# Patient Record
Sex: MALE | Race: WHITE | NOT HISPANIC OR LATINO | Employment: FULL TIME | URBAN - METROPOLITAN AREA
[De-identification: names, ages, dates, MRNs, and addresses within clinical notes are randomized per-mention and may not be internally consistent; named-entity substitution may affect disease eponyms.]

---

## 2017-04-06 ENCOUNTER — OFFICE VISIT (OUTPATIENT)
Dept: INTERNAL MEDICINE | Facility: IMAGING CENTER | Age: 72
End: 2017-04-06
Payer: COMMERCIAL

## 2017-04-06 VITALS
SYSTOLIC BLOOD PRESSURE: 124 MMHG | BODY MASS INDEX: 21.19 KG/M2 | WEIGHT: 148 LBS | TEMPERATURE: 97.6 F | DIASTOLIC BLOOD PRESSURE: 68 MMHG | OXYGEN SATURATION: 98 % | RESPIRATION RATE: 14 BRPM | HEIGHT: 70 IN | HEART RATE: 67 BPM

## 2017-04-06 DIAGNOSIS — R73.09 ELEVATED GLUCOSE: ICD-10-CM

## 2017-04-06 DIAGNOSIS — M54.42 CHRONIC LEFT-SIDED LOW BACK PAIN WITH LEFT-SIDED SCIATICA: ICD-10-CM

## 2017-04-06 DIAGNOSIS — G89.29 CHRONIC LEFT-SIDED LOW BACK PAIN WITH LEFT-SIDED SCIATICA: ICD-10-CM

## 2017-04-06 DIAGNOSIS — R79.89 ELEVATED TSH: ICD-10-CM

## 2017-04-06 DIAGNOSIS — M54.32 SCIATICA OF LEFT SIDE: ICD-10-CM

## 2017-04-06 PROCEDURE — 99214 OFFICE O/P EST MOD 30 MIN: CPT | Performed by: FAMILY MEDICINE

## 2017-04-06 RX ORDER — METHYLPREDNISOLONE 4 MG/1
TABLET ORAL
Qty: 1 KIT | Refills: 0 | Status: SHIPPED | OUTPATIENT
Start: 2017-04-06 | End: 2018-01-09

## 2017-04-06 NOTE — PROGRESS NOTES
Chief Complaint   Patient presents with   • Back Pain     low back pain with left sciatica.       HISTORY OF PRESENT ILLNESS: Patient is a 71 y.o. male established patient who presents today complaining of low back pain with pain that radiates down the back of his left leg intermittently. It is becoming more frequent. The pain is worse when he sits for very long or lies down. First thing in the morning he has significant pain in his lower back that radiates down the back of his leg. Then as he moves around and start walking and actually feels better. He has been working with a physical therapist and doing some stretches. It seems to be getting worse instead of better. He uses heat and massage. He does do regular stretching exercises. He has degenerative disc disease in his neck. He has never had imaging on his low back. No injuries.  He is on no prescription medications. He denies any weakness in his leg, no problems with bowel or bladder.    Also is due for repeat lab work. His glucose was slightly elevated as was his TSH in October.    Patient Active Problem List    Diagnosis Date Noted   • DDD (degenerative disc disease), cervical 10/16/2016   • Hernia, inguinal, left 04/08/2016     Current Outpatient Prescriptions on File Prior to Visit   Medication Sig Dispense Refill   • cyanocobalamin (VITAMIN B-12) 500 MCG Tab Take 500 mcg by mouth every day.     • SAW PALMETTO, SERENOA REPENS, PO Take  by mouth.     • GINSENG PO Take  by mouth.     • Flaxseed, Linseed, (FLAX SEED OIL PO) Take  by mouth every day.     • VITAMIN E PO Take  by mouth every day.       No current facility-administered medications on file prior to visit.       Past medical, surgical, family, and social history is reviewed and updated in Epic chart by me today.   Medications and allergies reviewed and updated in Epic chart by me today.     REVIEW OF SYSTEMS:  GENERAL: No fatigue, no weight loss.  CV:  No chest pain,dyspnea,palpitations or  "edema.  RESP:  No sob,cough,wheezing or hemoptysis.  GI: No dysphagia, heartburn,abdominal pain, nausea, vomiting, diarrhea or constipation.       No melena, jaundice, bleeding, incontinence or change in bowel habits.  :  No dysuria, polyuria, hematuria, incontinence, or nocturia.  MS: As above.  NEURO:  No seizures, syncope, paralysis, tremor, or weakness.  SKIN: No new or concerning skin lesions or changes.   PSYCH: Mood fine.    Filed Vitals:    04/06/17 1500   BP: 124/68   Pulse: 67   Temp: 36.4 °C (97.6 °F)   Resp: 14   Height: 1.778 m (5' 10\")   Weight: 67.132 kg (148 lb)   SpO2: 98%     Physical Exam:  Gen: Well developed, well nourished. No acute distress.  Neck:  Supple, no adenopathy or thyromegaly.  Heart:  Regular rate and rhythm.  Normal S1, S2. No murmur, gallop or rub.  Lungs:  Clear, No wheezes,rales or rhonchi.  Spine: Nontender. Mild tenderness in his left SI joint area and paraspinous muscles. Gait is normal. Full range of movement.  Extremities:  No edema.  Psych: Mood and affect are appropriate.        Assessment/Plan:  1. Sciatica of left side. Recommend Medrol Dosepak. He is going to be traveling on a cruise in about 2 weeks. Recommend MRI. He'll continue with physical therapy, stretches. Further treatment based on MRI results.  MR-LUMBAR SPINE-W/O   2. Chronic left-sided low back pain with left-sided sciatica  MR-LUMBAR SPINE-W/O   3. Elevated glucose . Recheck glucose and A1c.  BLOOD GLUCOSE    HEMOGLOBIN A1C   4. Elevated TSH . He is asymptomatic. Monitor TSH.  TSH    FREE THYROXINE          "

## 2017-04-12 ENCOUNTER — NON-PROVIDER VISIT (OUTPATIENT)
Dept: INTERNAL MEDICINE | Facility: IMAGING CENTER | Age: 72
End: 2017-04-12
Payer: COMMERCIAL

## 2017-04-12 ENCOUNTER — HOSPITAL ENCOUNTER (OUTPATIENT)
Facility: MEDICAL CENTER | Age: 72
End: 2017-04-12
Attending: FAMILY MEDICINE
Payer: COMMERCIAL

## 2017-04-12 DIAGNOSIS — Z01.89 ROUTINE LAB DRAW: ICD-10-CM

## 2017-04-12 DIAGNOSIS — R73.09 ELEVATED GLUCOSE: ICD-10-CM

## 2017-04-12 DIAGNOSIS — R79.89 ELEVATED TSH: ICD-10-CM

## 2017-04-12 LAB
EST. AVERAGE GLUCOSE BLD GHB EST-MCNC: 123 MG/DL
GLUCOSE SERPL-MCNC: 110 MG/DL (ref 65–99)
HBA1C MFR BLD: 5.9 % (ref 0–5.6)
T4 FREE SERPL-MCNC: 1.04 NG/DL (ref 0.53–1.43)
TSH SERPL DL<=0.005 MIU/L-ACNC: 4.55 UIU/ML (ref 0.3–3.7)

## 2017-04-12 PROCEDURE — 82947 ASSAY GLUCOSE BLOOD QUANT: CPT

## 2017-04-12 PROCEDURE — 83036 HEMOGLOBIN GLYCOSYLATED A1C: CPT

## 2017-04-12 PROCEDURE — 84439 ASSAY OF FREE THYROXINE: CPT

## 2017-04-12 PROCEDURE — 84443 ASSAY THYROID STIM HORMONE: CPT

## 2017-05-03 ENCOUNTER — HOSPITAL ENCOUNTER (OUTPATIENT)
Dept: RADIOLOGY | Facility: MEDICAL CENTER | Age: 72
End: 2017-05-03
Attending: FAMILY MEDICINE
Payer: COMMERCIAL

## 2017-05-03 DIAGNOSIS — M54.32 SCIATICA OF LEFT SIDE: ICD-10-CM

## 2017-05-03 DIAGNOSIS — G89.29 CHRONIC LEFT-SIDED LOW BACK PAIN WITH LEFT-SIDED SCIATICA: ICD-10-CM

## 2017-05-03 DIAGNOSIS — M54.42 CHRONIC LEFT-SIDED LOW BACK PAIN WITH LEFT-SIDED SCIATICA: ICD-10-CM

## 2017-05-03 PROCEDURE — 72148 MRI LUMBAR SPINE W/O DYE: CPT

## 2017-05-09 ENCOUNTER — OFFICE VISIT (OUTPATIENT)
Dept: INTERNAL MEDICINE | Facility: IMAGING CENTER | Age: 72
End: 2017-05-09
Payer: COMMERCIAL

## 2017-05-09 VITALS
BODY MASS INDEX: 21.19 KG/M2 | WEIGHT: 148 LBS | HEIGHT: 70 IN | SYSTOLIC BLOOD PRESSURE: 140 MMHG | HEART RATE: 68 BPM | TEMPERATURE: 96.7 F | OXYGEN SATURATION: 98 % | DIASTOLIC BLOOD PRESSURE: 75 MMHG | RESPIRATION RATE: 14 BRPM

## 2017-05-09 DIAGNOSIS — R73.09 ELEVATED GLUCOSE: ICD-10-CM

## 2017-05-09 DIAGNOSIS — E03.9 HYPOTHYROIDISM, UNSPECIFIED TYPE: ICD-10-CM

## 2017-05-09 DIAGNOSIS — M51.36 DDD (DEGENERATIVE DISC DISEASE), LUMBAR: ICD-10-CM

## 2017-05-09 DIAGNOSIS — R53.83 FATIGUE, UNSPECIFIED TYPE: ICD-10-CM

## 2017-05-09 DIAGNOSIS — M48.061 LUMBAR SPINAL STENOSIS: ICD-10-CM

## 2017-05-09 PROBLEM — M51.369 DDD (DEGENERATIVE DISC DISEASE), LUMBAR: Chronic | Status: ACTIVE | Noted: 2017-05-09

## 2017-05-09 PROBLEM — M51.369 DDD (DEGENERATIVE DISC DISEASE), LUMBAR: Status: ACTIVE | Noted: 2017-05-09

## 2017-05-09 PROCEDURE — 99214 OFFICE O/P EST MOD 30 MIN: CPT | Performed by: FAMILY MEDICINE

## 2017-05-09 RX ORDER — LEVOTHYROXINE SODIUM 0.05 MG/1
50 TABLET ORAL
Qty: 30 TAB | Refills: 6 | Status: SHIPPED | OUTPATIENT
Start: 2017-05-09 | End: 2017-08-15

## 2017-05-09 NOTE — MR AVS SNAPSHOT
"        Chirag Damon Jr.   2017 8:30 AM   Office Visit   MRN: [id number]    Department:  [facility]   Dept Phone:  [phone]    Description:  Male : 1945   Provider:  Aruna Almanzar M.D.           Reason for Visit     Follow-Up review MRI and labs      Allergies as of 2017     No Known Allergies      You were diagnosed with     DDD (degenerative disc disease), lumbar   [538794]       Lumbar spinal stenosis   [573143]       Hypothyroidism, unspecified type   [3707229]       Fatigue, unspecified type   [5121814]       Elevated glucose   [309121]         Vital Signs     Blood Pressure Pulse Temperature Respirations Height Weight    140/75 mmHg 68 35.9 °C (96.7 °F) 14 1.778 m (5' 10\") 67.132 kg (148 lb)    Body Mass Index Oxygen Saturation Smoking Status             21.24 kg/m2 98% Never Smoker          Basic Information     Date Of Birth Sex Race Ethnicity Preferred Language    1945 Male White Non- English      Problem List              ICD-10-CM Priority Class Noted - Resolved    Hernia, inguinal, left K40.90   2016 - Present    DDD (degenerative disc disease), cervical (Chronic) M50.30   10/16/2016 - Present    DDD (degenerative disc disease), lumbar (Chronic) M51.36   2017 - Present    Lumbar spinal stenosis (Chronic) M48.06   2017 - Present    Hypothyroidism E03.9   2017 - Present    Elevated glucose R73.09   2017 - Present      Health Maintenance        Date Due Completion Dates    IMM DTaP/Tdap/Td Vaccine (1 - Tdap) 1965 ---    IMM PNEUMOCOCCAL 65+ (ADULT) LOW/MEDIUM RISK SERIES (2 of 2 - PPSV23) 2017    COLONOSCOPY 10/27/2019 10/27/2014            Current Immunizations     13-VALENT PCV PREVNAR 2016    SHINGLES VACCINE 2016      Below and/or attached are the medications your provider expects you to take. Review all of your home medications and newly ordered medications with your provider and/or pharmacist. Follow " medication instructions as directed by your provider and/or pharmacist. Please keep your medication list with you and share with your provider. Update the information when medications are discontinued, doses are changed, or new medications (including over-the-counter products) are added; and carry medication information at all times in the event of emergency situations     Allergies:  No Known Allergies          Medications  Valid as of: May 09, 2017 - 11:35 AM    Generic Name Brand Name Tablet Size Instructions for use    Cyanocobalamin (Tab) VITAMIN B-12 500 MCG Take 500 mcg by mouth every day.        Flaxseed (Linseed)   Take  by mouth every day.        Ginseng   Take  by mouth.        Levothyroxine Sodium (Tab) SYNTHROID 50 MCG Take 1 Tab by mouth Every morning on an empty stomach.        MethylPREDNISolone (Tablet Therapy Pack) MEDROL DOSEPAK 4 MG Take as directed        Saw Palmetto (Serenoa repens)   Take  by mouth.        Vitamin E   Take  by mouth every day.        .                 Medicines prescribed today were sent to:     [id number] - [place], [place] - [address]    [address] [address]    Phone: [phone] Fax: [phone]    Open 24 Hours?: No      Medication refill instructions:       If your prescription bottle indicates you have medication refills left, it is not necessary to call your provider’s office. Please contact your pharmacy and they will refill your medication.    If your prescription bottle indicates you do not have any refills left, you may request refills at any time through one of the following ways: The online [employer] system (except Urgent Care), by calling your provider’s office, or by asking your pharmacy to contact your provider’s office with a refill request. Medication refills are processed only during regular business hours and may not be available until the next business day. Your provider may request additional information or to have a follow-up visit  with you prior to refilling your medication.   *Please Note: Medication refills are assigned a new Rx number when refilled electronically. Your pharmacy may indicate that no refills were authorized even though a new prescription for the same medication is available at the pharmacy. Please request the medicine by name with the pharmacy before contacting your provider for a refill.        Your To Do List     Future Labs/Procedures Complete By Expires    BLOOD GLUCOSE  As directed 5/10/2018    FREE THYROXINE  As directed 5/10/2018    TESTOSTERONE SERUM  As directed 5/9/2018    TSH  As directed 5/10/2018      Referral     A referral request has been sent to our patient care coordination department. Please allow 3-5 business days for us to process this request and contact you either by phone or mail. If you do not hear from us by the 5th business day, please call us at [phone].        Other Notes About Your Plan     Ophth:  Dr. Allen  PT:  Billy Mcdowell  GI:  [facility]           MyChart Access Code: Activation code not generated  Current MyChart Status: Active

## 2017-05-09 NOTE — PROGRESS NOTES
Chief Complaint   Patient presents with   • Follow-Up     review MRI and labs       HISTORY OF PRESENT ILLNESS: Patient is a 71 y.o. male established patient who presents today follow-up on his MRI. He is having low back pain. It's worse in the morning. He feels stiff in the mornings. Initially he has trouble walking straight. He bends over and is more comfortable. After he does some exercises and gets moving his back does feel better. He has some left radicular symptoms. Was prescribed a Medrol Dosepak which he has not yet taken. Denies weakness.    MRI showed:  L5-S1: There is mild broad posterior endplate spurring. There is mild facet arthropathy. There is mild to moderate bilateral neural foraminal stenosis.  L4-5: There is moderate broad posterior disc bulge. There is moderate to marked facet arthropathy and ligamentum flavum hypertrophy. There is severe central canal stenosis with probable nerve root compression. There is mild to moderate right and moderate   left neural foraminal stenosis.  L3-4: There is moderate broad posterior endplate spurring. There is mild facet arthropathy and ligamentum flavum hypertrophy. There is mild central canal stenosis. There is moderate right and mild-to-moderate left neural foraminal stenosis.  L2-3: There is mild broad posterior disc bulge. There is mild facet arthropathy and ligamentum flavum hypertrophy. There is borderline central canal stenosis. There is mild to moderate bilateral neural foraminal stenosis.  L1-2: There is mild broad posterior disc bulge. There is mild facet arthropathy and ligamentum flavum hypertrophy. There is mild to moderate left neural foraminal stenosis.    Also had a repeat lab test done. He has TSH was mildly elevated. He does have a little fatigue. No other symptoms.  Glucose continues to be mildly elevated. His A1c was 5.9. He eats healthy, exercises.      Patient Active Problem List    Diagnosis Date Noted   • DDD (degenerative disc disease),  "lumbar 05/09/2017   • Lumbar spinal stenosis 05/09/2017   • Hypothyroidism 05/09/2017   • Elevated glucose 05/09/2017   • DDD (degenerative disc disease), cervical 10/16/2016   • Hernia, inguinal, left 04/08/2016     Current Outpatient Prescriptions on File Prior to Visit   Medication Sig Dispense Refill   • MethylPREDNISolone (MEDROL DOSEPAK) 4 MG Tablet Therapy Pack Take as directed 1 Kit 0   • cyanocobalamin (VITAMIN B-12) 500 MCG Tab Take 500 mcg by mouth every day.     • SAW PALMETTO, SERENOA REPENS, PO Take  by mouth.     • GINSENG PO Take  by mouth.     • Flaxseed, Linseed, (FLAX SEED OIL PO) Take  by mouth every day.     • VITAMIN E PO Take  by mouth every day.       No current facility-administered medications on file prior to visit.         Past medical, surgical, family, and social history is reviewed and updated in Epic chart by me today.   Medications and allergies reviewed and updated in Epic chart by me today.     REVIEW OF SYSTEMS:  GENERAL: mild fatigue, no weight loss.  CV:  No chest pain,dyspnea,palpitations or edema.  RESP:  No sob,cough,wheezing or hemoptysis.  GI: No dysphagia, heartburn,abdominal pain, nausea, vomiting, diarrhea or constipation.       No melena, jaundice, bleeding, incontinence or change in bowel habits.  :  No dysuria, polyuria, hematuria, incontinence, or nocturia.  MS: As above.  NEURO:  No seizures, syncope, paralysis, tremor, or weakness.  SKIN: He has multiple skin spots he would like checked today, some of them are itchy and irrated.   PSYCH: Mood fine.    Filed Vitals:    05/09/17 0800   BP: 140/75   Pulse: 68   Temp: 35.9 °C (96.7 °F)   Resp: 14   Height: 1.778 m (5' 10\")   Weight: 67.132 kg (148 lb)   SpO2: 98%       Physical Exam:  Gen: Well developed, well nourished. No acute distress.  Neck:  Supple, no adenopathy or thyromegaly.  Heart:  Regular rate and rhythm.  Normal S1, S2. No murmur, gallop or rub.  Lungs:  Clear, No wheezes,rales or rhonchi.  Extremities: "  No edema.  Psych: Mood and affect are appropriate.  Skin: multiple scattered , brown, elevated lesions consistent with seborrheic keratoses. These are scattered on his neck, trunk and arms.          Assessment/Plan:  1. DDD (degenerative disc disease), lumbar. Referral to physical therapy. Education. He may take the Medrol Dosepak. Follow-up in 3 months.  REFERRAL TO PHYSICAL THERAPY Reason for Therapy: Eval/Treat/Report   2. Lumbar spinal stenosis  REFERRAL TO PHYSICAL THERAPY Reason for Therapy: Eval/Treat/Report   3. Hypothyroidism, unspecified type. Start levothyroxine 50 µg daily. Recheck in 3 months.  levothyroxine (SYNTHROID) 50 MCG Tab    TSH    FREE THYROXINE   4. Fatigue, unspecified type . Check testosterone with his next lab work.  TESTOSTERONE SERUM   5. Elevated glucose . Continue efforts at healthy diet, regular exercise. Monitor.  BLOOD GLUCOSE   6 seborrheic keratoses. Multiple lesions are treated with liquid nitrogen. He is instructed in wound care and will call for any concerns..        Follow-up 3 months and as needed.

## 2017-07-31 ENCOUNTER — NON-PROVIDER VISIT (OUTPATIENT)
Dept: INTERNAL MEDICINE | Facility: IMAGING CENTER | Age: 72
End: 2017-07-31
Payer: COMMERCIAL

## 2017-07-31 ENCOUNTER — HOSPITAL ENCOUNTER (OUTPATIENT)
Facility: MEDICAL CENTER | Age: 72
End: 2017-07-31
Attending: FAMILY MEDICINE
Payer: COMMERCIAL

## 2017-07-31 DIAGNOSIS — Z01.89 ROUTINE LAB DRAW: ICD-10-CM

## 2017-07-31 DIAGNOSIS — E03.9 HYPOTHYROIDISM, UNSPECIFIED TYPE: ICD-10-CM

## 2017-07-31 DIAGNOSIS — R73.09 ELEVATED GLUCOSE: ICD-10-CM

## 2017-07-31 DIAGNOSIS — R53.83 FATIGUE, UNSPECIFIED TYPE: ICD-10-CM

## 2017-07-31 LAB
GLUCOSE SERPL-MCNC: 106 MG/DL (ref 65–99)
T4 FREE SERPL-MCNC: 0.91 NG/DL (ref 0.53–1.43)
TESTOST SERPL-MCNC: 632 NG/DL (ref 175–781)
TSH SERPL DL<=0.005 MIU/L-ACNC: 4.51 UIU/ML (ref 0.3–3.7)

## 2017-07-31 PROCEDURE — 84439 ASSAY OF FREE THYROXINE: CPT

## 2017-07-31 PROCEDURE — 84403 ASSAY OF TOTAL TESTOSTERONE: CPT

## 2017-07-31 PROCEDURE — 82947 ASSAY GLUCOSE BLOOD QUANT: CPT

## 2017-07-31 PROCEDURE — 36415 COLL VENOUS BLD VENIPUNCTURE: CPT | Performed by: FAMILY MEDICINE

## 2017-07-31 PROCEDURE — 84443 ASSAY THYROID STIM HORMONE: CPT

## 2017-08-14 ENCOUNTER — TELEPHONE (OUTPATIENT)
Dept: INTERNAL MEDICINE | Facility: IMAGING CENTER | Age: 72
End: 2017-08-14

## 2017-08-14 NOTE — TELEPHONE ENCOUNTER
----- Message from Jessica Miller R.N. sent at 8/14/2017 10:47 AM PDT -----  Chirag states that he is taking his thyroid and would like to discuss the lab results with you over the phone [phone].    Jessica

## 2017-08-15 RX ORDER — LEVOTHYROXINE SODIUM 0.07 MG/1
75 TABLET ORAL
Qty: 30 TAB | Refills: 6 | Status: SHIPPED | OUTPATIENT
Start: 2017-08-15 | End: 2017-10-23 | Stop reason: SDUPTHER

## 2017-10-23 RX ORDER — LEVOTHYROXINE SODIUM 0.07 MG/1
75 TABLET ORAL
Qty: 30 TAB | Refills: 6 | Status: SHIPPED | OUTPATIENT
Start: 2017-10-23 | End: 2019-10-30

## 2018-01-09 ENCOUNTER — OFFICE VISIT (OUTPATIENT)
Dept: INTERNAL MEDICINE | Facility: IMAGING CENTER | Age: 73
End: 2018-01-09
Payer: COMMERCIAL

## 2018-01-09 VITALS
HEART RATE: 68 BPM | SYSTOLIC BLOOD PRESSURE: 134 MMHG | RESPIRATION RATE: 14 BRPM | DIASTOLIC BLOOD PRESSURE: 72 MMHG | OXYGEN SATURATION: 96 % | TEMPERATURE: 97.8 F | HEIGHT: 70 IN | BODY MASS INDEX: 21.19 KG/M2 | WEIGHT: 148 LBS

## 2018-01-09 DIAGNOSIS — M51.36 DDD (DEGENERATIVE DISC DISEASE), LUMBAR: Chronic | ICD-10-CM

## 2018-01-09 DIAGNOSIS — M54.16 LEFT LUMBAR RADICULITIS: ICD-10-CM

## 2018-01-09 PROCEDURE — 99214 OFFICE O/P EST MOD 30 MIN: CPT | Performed by: FAMILY MEDICINE

## 2018-01-09 RX ORDER — METHYLPREDNISOLONE 4 MG/1
TABLET ORAL
Qty: 1 KIT | Refills: 0 | Status: SHIPPED | OUTPATIENT
Start: 2018-01-09 | End: 2018-01-12 | Stop reason: SDUPTHER

## 2018-01-09 NOTE — PROGRESS NOTES
Chief Complaint   Patient presents with   • Leg Pain     left     HISTORY OF PRESENT ILLNESS: Patient is a 72 y.o. male established patient who presents today Complaining of numbness tingling and pain into his left lateral leg. He took a trip to [place]. He thinks that the long ride home have been what aggravated his sciatica. He had an MRI in the May, 2017 that did show significant degenerative disc disease with spinal stenosis and foraminal stenosis. He states he was having a lot of pain in that left leg yesterday morning. He met with his physical therapist yesterday afternoon and that seemed to help. During the night he was most comfortable if he was in the fetal position. He denies any weakness into his leg. No foot drop. No bowel or bladder issues. He has not taken anything for it but doing stretches has helped.    In regards to his ongoing degenerative disc disease, he would like to avoid epidurals and surgery. He again works with the therapist. Exercise-wise he is no longer able to run. Hiking down hills is painful. He does do best on exercise bicycle and is looking into buying 1.        Patient Active Problem List    Diagnosis Date Noted   • DDD (degenerative disc disease), lumbar 05/09/2017   • Lumbar spinal stenosis 05/09/2017   • Hypothyroidism 05/09/2017   • Elevated glucose 05/09/2017   • DDD (degenerative disc disease), cervical 10/16/2016   • Hernia, inguinal, left 04/08/2016     Current Outpatient Prescriptions on File Prior to Visit   Medication Sig Dispense Refill   • levothyroxine (SYNTHROID) 75 MCG Tab Take 1 Tab by mouth Every morning on an empty stomach. 30 Tab 6   • cyanocobalamin (VITAMIN B-12) 500 MCG Tab Take 500 mcg by mouth every day.     • SAW PALMETTO, SERENOA REPENS, PO Take  by mouth.     • GINSENG PO Take  by mouth.     • Flaxseed, Linseed, (FLAX SEED OIL PO) Take  by mouth every day.     • VITAMIN E PO Take  by mouth every day.       No current facility-administered medications on  "file prior to visit.        Past medical, surgical, family, and social history is reviewed and updated in Epic chart by me today.   Medications and allergies reviewed and updated in Epic chart by me today.     REVIEW OF SYSTEMS:  GENERAL: No fatigue, no weight loss.  CV:  No chest pain,dyspnea,palpitations or edema.  RESP:  No sob,cough,wheezing or hemoptysis.  GI: No dysphagia, heartburn,abdominal pain, nausea, vomiting, diarrhea or constipation.       No melena, jaundice, bleeding, incontinence or change in bowel habits.  :  No dysuria, polyuria, hematuria, incontinence, or nocturia.  MS: As above.  NEURO:  No seizures, syncope, paralysis, tremor, or weakness.  SKIN: No new or concerning skin lesions or changes.   PSYCH: Mood fine.    Vitals:    01/09/18 1400   BP: 134/72   Pulse: 68   Resp: 14   Temp: 36.6 °C (97.8 °F)   SpO2: 96%   Weight: 67.1 kg (148 lb)   Height: 1.778 m (5' 10\")     Physical Exam:  Gen: Well developed, well nourished. No acute distress.  Neck:  Supple, no adenopathy or thyromegaly.  Heart:  Regular rate and rhythm.  Normal S1, S2. No murmur, gallop or rub.  Lungs:  Clear, No wheezes,rales or rhonchi.  Back:   mild tenderness along his LS spine. Full range of motion. Gait is normal. Muscle strength 5/5 and symmetric in his lower extremities.   EXtremities:  No edema.  Psych: Mood and affect are appropriate.    Reviewed MRI report from May third, 2017.    Assessment/Plan:  1. DDD (degenerative disc disease), lumbar. Encouraged him to continue with physical therapy. Gentle stretches. Medrol Dosepak. Call if not improving.  MethylPREDNISolone (MEDROL DOSEPAK) 4 MG Tablet Therapy Pack   2. Left lumbar radiculitis  MethylPREDNISolone (MEDROL DOSEPAK) 4 MG Tablet Therapy Pack        "

## 2018-01-12 DIAGNOSIS — M54.16 LEFT LUMBAR RADICULITIS: ICD-10-CM

## 2018-01-12 DIAGNOSIS — M51.36 DDD (DEGENERATIVE DISC DISEASE), LUMBAR: Chronic | ICD-10-CM

## 2018-01-12 RX ORDER — METHYLPREDNISOLONE 4 MG/1
TABLET ORAL
Qty: 1 KIT | Refills: 0 | Status: SHIPPED | OUTPATIENT
Start: 2018-01-12 | End: 2019-03-21

## 2018-01-23 DIAGNOSIS — Z00.00 PREVENTATIVE HEALTH CARE: ICD-10-CM

## 2018-01-23 DIAGNOSIS — R73.09 ELEVATED GLUCOSE: ICD-10-CM

## 2018-01-23 DIAGNOSIS — E03.9 HYPOTHYROIDISM, UNSPECIFIED TYPE: ICD-10-CM

## 2018-01-31 ENCOUNTER — NON-PROVIDER VISIT (OUTPATIENT)
Dept: INTERNAL MEDICINE | Facility: IMAGING CENTER | Age: 73
End: 2018-01-31
Payer: COMMERCIAL

## 2018-01-31 ENCOUNTER — HOSPITAL ENCOUNTER (OUTPATIENT)
Facility: MEDICAL CENTER | Age: 73
End: 2018-01-31
Attending: FAMILY MEDICINE
Payer: COMMERCIAL

## 2018-01-31 DIAGNOSIS — Z01.89 ROUTINE LAB DRAW: ICD-10-CM

## 2018-01-31 DIAGNOSIS — E03.9 HYPOTHYROIDISM, UNSPECIFIED TYPE: ICD-10-CM

## 2018-01-31 DIAGNOSIS — R73.09 ELEVATED GLUCOSE: ICD-10-CM

## 2018-01-31 DIAGNOSIS — Z00.00 PREVENTATIVE HEALTH CARE: ICD-10-CM

## 2018-01-31 LAB
25(OH)D3 SERPL-MCNC: 23 NG/ML (ref 30–100)
ALBUMIN SERPL BCP-MCNC: 4.6 G/DL (ref 3.2–4.9)
ALBUMIN/GLOB SERPL: 1.8 G/DL
ALP SERPL-CCNC: 42 U/L (ref 30–99)
ALT SERPL-CCNC: 18 U/L (ref 2–50)
ANION GAP SERPL CALC-SCNC: 7 MMOL/L (ref 0–11.9)
AST SERPL-CCNC: 20 U/L (ref 12–45)
BASOPHILS # BLD AUTO: 0.9 % (ref 0–1.8)
BASOPHILS # BLD: 0.05 K/UL (ref 0–0.12)
BILIRUB SERPL-MCNC: 0.5 MG/DL (ref 0.1–1.5)
BUN SERPL-MCNC: 12 MG/DL (ref 8–22)
CALCIUM SERPL-MCNC: 9 MG/DL (ref 8.5–10.5)
CHLORIDE SERPL-SCNC: 99 MMOL/L (ref 96–112)
CHOLEST SERPL-MCNC: 174 MG/DL (ref 100–199)
CO2 SERPL-SCNC: 27 MMOL/L (ref 20–33)
CREAT SERPL-MCNC: 0.84 MG/DL (ref 0.5–1.4)
EOSINOPHIL # BLD AUTO: 0.1 K/UL (ref 0–0.51)
EOSINOPHIL NFR BLD: 1.7 % (ref 0–6.9)
ERYTHROCYTE [DISTWIDTH] IN BLOOD BY AUTOMATED COUNT: 45.9 FL (ref 35.9–50)
EST. AVERAGE GLUCOSE BLD GHB EST-MCNC: 126 MG/DL
GLOBULIN SER CALC-MCNC: 2.5 G/DL (ref 1.9–3.5)
GLUCOSE SERPL-MCNC: 112 MG/DL (ref 65–99)
HBA1C MFR BLD: 6 % (ref 0–5.6)
HCT VFR BLD AUTO: 40.8 % (ref 42–52)
HDLC SERPL-MCNC: 63 MG/DL
HGB BLD-MCNC: 13.8 G/DL (ref 14–18)
IMM GRANULOCYTES # BLD AUTO: 0.02 K/UL (ref 0–0.11)
IMM GRANULOCYTES NFR BLD AUTO: 0.3 % (ref 0–0.9)
LDLC SERPL CALC-MCNC: 101 MG/DL
LYMPHOCYTES # BLD AUTO: 2.31 K/UL (ref 1–4.8)
LYMPHOCYTES NFR BLD: 40.4 % (ref 22–41)
MCH RBC QN AUTO: 32.6 PG (ref 27–33)
MCHC RBC AUTO-ENTMCNC: 33.8 G/DL (ref 33.7–35.3)
MCV RBC AUTO: 96.5 FL (ref 81.4–97.8)
MONOCYTES # BLD AUTO: 0.56 K/UL (ref 0–0.85)
MONOCYTES NFR BLD AUTO: 9.8 % (ref 0–13.4)
NEUTROPHILS # BLD AUTO: 2.68 K/UL (ref 1.82–7.42)
NEUTROPHILS NFR BLD: 46.9 % (ref 44–72)
NRBC # BLD AUTO: 0 K/UL
NRBC BLD-RTO: 0 /100 WBC
PLATELET # BLD AUTO: 267 K/UL (ref 164–446)
PMV BLD AUTO: 8.7 FL (ref 9–12.9)
POTASSIUM SERPL-SCNC: 3.9 MMOL/L (ref 3.6–5.5)
PROT SERPL-MCNC: 7.1 G/DL (ref 6–8.2)
RBC # BLD AUTO: 4.23 M/UL (ref 4.7–6.1)
SODIUM SERPL-SCNC: 133 MMOL/L (ref 135–145)
T4 FREE SERPL-MCNC: 1 NG/DL (ref 0.53–1.43)
TRIGL SERPL-MCNC: 52 MG/DL (ref 0–149)
TSH SERPL DL<=0.005 MIU/L-ACNC: 4.16 UIU/ML (ref 0.38–5.33)
WBC # BLD AUTO: 5.7 K/UL (ref 4.8–10.8)

## 2018-01-31 PROCEDURE — 84443 ASSAY THYROID STIM HORMONE: CPT

## 2018-01-31 PROCEDURE — 85025 COMPLETE CBC W/AUTO DIFF WBC: CPT

## 2018-01-31 PROCEDURE — 82306 VITAMIN D 25 HYDROXY: CPT

## 2018-01-31 PROCEDURE — 84439 ASSAY OF FREE THYROXINE: CPT

## 2018-01-31 PROCEDURE — 83036 HEMOGLOBIN GLYCOSYLATED A1C: CPT

## 2018-01-31 PROCEDURE — 80053 COMPREHEN METABOLIC PANEL: CPT

## 2018-01-31 PROCEDURE — 80061 LIPID PANEL: CPT

## 2018-02-02 ENCOUNTER — TELEPHONE (OUTPATIENT)
Dept: INTERNAL MEDICINE | Facility: IMAGING CENTER | Age: 73
End: 2018-02-02

## 2018-02-02 DIAGNOSIS — Z12.11 COLON CANCER SCREENING: ICD-10-CM

## 2018-02-02 DIAGNOSIS — E55.9 VITAMIN D DEFICIENCY: ICD-10-CM

## 2018-02-02 NOTE — TELEPHONE ENCOUNTER
Please let Chirag know his labs are overall fine.  His glucose still mildly elevated, but A1c is stable. At 6.0  His hgb is just slightly low. I would like him to do a stool test for microscopic blood to monitor and then repeat lab in 3 months.  His vit D is low. Take 2000 IU daily. If he already is taking vit D---increase by 2000 IU.

## 2018-02-14 ENCOUNTER — HOSPITAL ENCOUNTER (OUTPATIENT)
Facility: MEDICAL CENTER | Age: 73
End: 2018-02-14
Attending: FAMILY MEDICINE
Payer: COMMERCIAL

## 2018-02-14 PROCEDURE — 82274 ASSAY TEST FOR BLOOD FECAL: CPT

## 2018-02-22 DIAGNOSIS — Z12.11 COLON CANCER SCREENING: ICD-10-CM

## 2018-02-23 LAB — HEMOCCULT STL QL IA: NEGATIVE

## 2018-06-28 RX ORDER — LEVOTHYROXINE SODIUM 0.05 MG/1
TABLET ORAL
Qty: 30 TAB | Refills: 5 | Status: SHIPPED | OUTPATIENT
Start: 2018-06-28 | End: 2019-03-21

## 2018-07-16 ENCOUNTER — TELEPHONE (OUTPATIENT)
Dept: INTERNAL MEDICINE | Facility: IMAGING CENTER | Age: 73
End: 2018-07-16

## 2018-07-16 DIAGNOSIS — Z12.5 SCREENING FOR MALIGNANT NEOPLASM OF PROSTATE: ICD-10-CM

## 2018-07-16 DIAGNOSIS — R73.09 ELEVATED GLUCOSE: ICD-10-CM

## 2018-07-16 DIAGNOSIS — E55.9 VITAMIN D DEFICIENCY: ICD-10-CM

## 2018-07-16 DIAGNOSIS — E03.9 HYPOTHYROIDISM, UNSPECIFIED TYPE: ICD-10-CM

## 2018-07-17 NOTE — TELEPHONE ENCOUNTER
----- Message from Jessica Miller R.N. sent at 7/16/2018  4:12 PM PDT -----  Do you want any labs?  He is also coming Tuesday.    M

## 2019-02-01 ENCOUNTER — APPOINTMENT (OUTPATIENT)
Dept: INTERNAL MEDICINE | Facility: IMAGING CENTER | Age: 74
End: 2019-02-01
Payer: MEDICARE

## 2019-02-01 ENCOUNTER — APPOINTMENT (OUTPATIENT)
Dept: LAB | Facility: MEDICAL CENTER | Age: 74
End: 2019-02-01
Attending: FAMILY MEDICINE
Payer: MEDICARE

## 2019-02-15 DIAGNOSIS — D64.9 MILD ANEMIA: ICD-10-CM

## 2019-02-15 DIAGNOSIS — R73.09 ELEVATED GLUCOSE: ICD-10-CM

## 2019-02-15 DIAGNOSIS — E78.00 ELEVATED CHOLESTEROL: ICD-10-CM

## 2019-03-15 ENCOUNTER — NON-PROVIDER VISIT (OUTPATIENT)
Dept: INTERNAL MEDICINE | Facility: IMAGING CENTER | Age: 74
End: 2019-03-15
Payer: MEDICARE

## 2019-03-15 ENCOUNTER — HOSPITAL ENCOUNTER (OUTPATIENT)
Facility: MEDICAL CENTER | Age: 74
End: 2019-03-15
Attending: FAMILY MEDICINE
Payer: MEDICARE

## 2019-03-15 DIAGNOSIS — E78.00 ELEVATED CHOLESTEROL: ICD-10-CM

## 2019-03-15 DIAGNOSIS — D64.9 MILD ANEMIA: ICD-10-CM

## 2019-03-15 DIAGNOSIS — E03.9 HYPOTHYROIDISM, UNSPECIFIED TYPE: ICD-10-CM

## 2019-03-15 DIAGNOSIS — R73.09 ELEVATED GLUCOSE: ICD-10-CM

## 2019-03-15 DIAGNOSIS — E55.9 VITAMIN D DEFICIENCY: ICD-10-CM

## 2019-03-15 DIAGNOSIS — Z12.5 SCREENING FOR MALIGNANT NEOPLASM OF PROSTATE: ICD-10-CM

## 2019-03-15 LAB
25(OH)D3 SERPL-MCNC: 20 NG/ML (ref 30–100)
ALBUMIN SERPL BCP-MCNC: 4.4 G/DL (ref 3.2–4.9)
ALBUMIN/GLOB SERPL: 1.4 G/DL
ALP SERPL-CCNC: 50 U/L (ref 30–99)
ALT SERPL-CCNC: 14 U/L (ref 2–50)
ANION GAP SERPL CALC-SCNC: 7 MMOL/L (ref 0–11.9)
AST SERPL-CCNC: 17 U/L (ref 12–45)
BASOPHILS # BLD AUTO: 0.8 % (ref 0–1.8)
BASOPHILS # BLD: 0.04 K/UL (ref 0–0.12)
BILIRUB SERPL-MCNC: 0.6 MG/DL (ref 0.1–1.5)
BUN SERPL-MCNC: 10 MG/DL (ref 8–22)
CALCIUM SERPL-MCNC: 9.6 MG/DL (ref 8.5–10.5)
CHLORIDE SERPL-SCNC: 102 MMOL/L (ref 96–112)
CHOLEST SERPL-MCNC: 182 MG/DL (ref 100–199)
CO2 SERPL-SCNC: 24 MMOL/L (ref 20–33)
CREAT SERPL-MCNC: 1.03 MG/DL (ref 0.5–1.4)
EOSINOPHIL # BLD AUTO: 0.16 K/UL (ref 0–0.51)
EOSINOPHIL NFR BLD: 3.2 % (ref 0–6.9)
ERYTHROCYTE [DISTWIDTH] IN BLOOD BY AUTOMATED COUNT: 47.8 FL (ref 35.9–50)
EST. AVERAGE GLUCOSE BLD GHB EST-MCNC: 128 MG/DL
GLOBULIN SER CALC-MCNC: 3.2 G/DL (ref 1.9–3.5)
GLUCOSE SERPL-MCNC: 112 MG/DL (ref 65–99)
HBA1C MFR BLD: 6.1 % (ref 0–5.6)
HCT VFR BLD AUTO: 43.6 % (ref 42–52)
HDLC SERPL-MCNC: 71 MG/DL
HGB BLD-MCNC: 14.4 G/DL (ref 14–18)
IMM GRANULOCYTES # BLD AUTO: 0.01 K/UL (ref 0–0.11)
IMM GRANULOCYTES NFR BLD AUTO: 0.2 % (ref 0–0.9)
LDLC SERPL CALC-MCNC: 100 MG/DL
LYMPHOCYTES # BLD AUTO: 2.15 K/UL (ref 1–4.8)
LYMPHOCYTES NFR BLD: 42.5 % (ref 22–41)
MCH RBC QN AUTO: 33 PG (ref 27–33)
MCHC RBC AUTO-ENTMCNC: 33 G/DL (ref 33.7–35.3)
MCV RBC AUTO: 100 FL (ref 81.4–97.8)
MONOCYTES # BLD AUTO: 0.5 K/UL (ref 0–0.85)
MONOCYTES NFR BLD AUTO: 9.9 % (ref 0–13.4)
NEUTROPHILS # BLD AUTO: 2.2 K/UL (ref 1.82–7.42)
NEUTROPHILS NFR BLD: 43.4 % (ref 44–72)
NRBC # BLD AUTO: 0 K/UL
NRBC BLD-RTO: 0 /100 WBC
PLATELET # BLD AUTO: 279 K/UL (ref 164–446)
PMV BLD AUTO: 8.8 FL (ref 9–12.9)
POTASSIUM SERPL-SCNC: 4.4 MMOL/L (ref 3.6–5.5)
PROT SERPL-MCNC: 7.6 G/DL (ref 6–8.2)
PSA SERPL-MCNC: 0.27 NG/ML (ref 0–4)
RBC # BLD AUTO: 4.36 M/UL (ref 4.7–6.1)
SODIUM SERPL-SCNC: 133 MMOL/L (ref 135–145)
T4 FREE SERPL-MCNC: 1.08 NG/DL (ref 0.53–1.43)
TRIGL SERPL-MCNC: 53 MG/DL (ref 0–149)
TSH SERPL DL<=0.005 MIU/L-ACNC: 3.22 UIU/ML (ref 0.38–5.33)
WBC # BLD AUTO: 5.1 K/UL (ref 4.8–10.8)

## 2019-03-15 PROCEDURE — 83036 HEMOGLOBIN GLYCOSYLATED A1C: CPT

## 2019-03-15 PROCEDURE — 85025 COMPLETE CBC W/AUTO DIFF WBC: CPT

## 2019-03-15 PROCEDURE — 84439 ASSAY OF FREE THYROXINE: CPT

## 2019-03-15 PROCEDURE — 84153 ASSAY OF PSA TOTAL: CPT

## 2019-03-15 PROCEDURE — 80053 COMPREHEN METABOLIC PANEL: CPT

## 2019-03-15 PROCEDURE — 80061 LIPID PANEL: CPT

## 2019-03-15 PROCEDURE — 84443 ASSAY THYROID STIM HORMONE: CPT

## 2019-03-15 PROCEDURE — 82306 VITAMIN D 25 HYDROXY: CPT

## 2019-03-21 ENCOUNTER — OFFICE VISIT (OUTPATIENT)
Dept: INTERNAL MEDICINE | Facility: IMAGING CENTER | Age: 74
End: 2019-03-21
Payer: MEDICARE

## 2019-03-21 VITALS
WEIGHT: 147 LBS | RESPIRATION RATE: 12 BRPM | DIASTOLIC BLOOD PRESSURE: 70 MMHG | BODY MASS INDEX: 21.05 KG/M2 | SYSTOLIC BLOOD PRESSURE: 118 MMHG | HEIGHT: 70 IN | HEART RATE: 60 BPM | TEMPERATURE: 97.4 F | OXYGEN SATURATION: 99 %

## 2019-03-21 DIAGNOSIS — L57.0 AK (ACTINIC KERATOSIS): ICD-10-CM

## 2019-03-21 DIAGNOSIS — E03.9 HYPOTHYROIDISM, UNSPECIFIED TYPE: ICD-10-CM

## 2019-03-21 DIAGNOSIS — E55.9 VITAMIN D DEFICIENCY: ICD-10-CM

## 2019-03-21 DIAGNOSIS — L82.1 SK (SEBORRHEIC KERATOSIS): ICD-10-CM

## 2019-03-21 DIAGNOSIS — R73.09 ELEVATED GLUCOSE: ICD-10-CM

## 2019-03-21 PROCEDURE — 17000 DESTRUCT PREMALG LESION: CPT | Mod: 59 | Performed by: FAMILY MEDICINE

## 2019-03-21 PROCEDURE — 17110 DESTRUCTION B9 LES UP TO 14: CPT | Performed by: FAMILY MEDICINE

## 2019-03-21 PROCEDURE — 17003 DESTRUCT PREMALG LES 2-14: CPT | Performed by: FAMILY MEDICINE

## 2019-03-21 PROCEDURE — 99213 OFFICE O/P EST LOW 20 MIN: CPT | Mod: 25 | Performed by: FAMILY MEDICINE

## 2019-03-21 NOTE — PROGRESS NOTES
Chief Complaint   Patient presents with   • Skin Lesion     check multiple lesions   • Other     review labs       HISTORY OF PRESENT ILLNESS: Patient is a 73 y.o. male established patient who presents today to review lab work.  He has a history of hypothyroidism.  He is on levothyroxine 75 mcg.  He takes it about 5 days of the week.  He states he does sometimes forget.  He denies any palpitations, no constipation, no diarrhea.  His weight stays stable.    His glucose remains mildly elevated.  Consistently around 115 or less.  His A1c is 6.1.  He does try to monitor his diet.  He exercises.  He is not overweight.    His vitamin D is low.  He is presently not taking a supplementation.    Is also complaining of multiple skin lesions.  He like to have these checked.  He has multiple on his back and anterior chest.  Some along his neckline that do catch on clothing.  And then he has several on his scalp and forehead.  He does try to wear hats and use sun protection.          Patient Active Problem List    Diagnosis Date Noted   • Vitamin D deficiency 02/02/2018   • DDD (degenerative disc disease), lumbar 05/09/2017   • Lumbar spinal stenosis 05/09/2017   • Hypothyroidism 05/09/2017   • Elevated glucose 05/09/2017   • DDD (degenerative disc disease), cervical 10/16/2016   • Hernia, inguinal, left 04/08/2016     Current Outpatient Prescriptions on File Prior to Visit   Medication Sig Dispense Refill   • levothyroxine (SYNTHROID) 75 MCG Tab Take 1 Tab by mouth Every morning on an empty stomach. 30 Tab 6   • cyanocobalamin (VITAMIN B-12) 500 MCG Tab Take 500 mcg by mouth every day.     • SAW PALMETTO, SERENOA REPENS, PO Take  by mouth.     • GINSENG PO Take  by mouth.     • Flaxseed, Linseed, (FLAX SEED OIL PO) Take  by mouth every day.     • VITAMIN E PO Take  by mouth every day.       No current facility-administered medications on file prior to visit.          Past medical, surgical, family, and social history is  "reviewed and updated in Epic chart by me today.   Medications and allergies reviewed and updated in Epic chart by me today.     REVIEW OF SYSTEMS:  GENERAL: No fatigue, no weight loss.  HEENT:  Ears--no earache, no change in hearing, no dizziness, no tinnitus.                 Eyes--no blurred vision, no discharge or pain                 Throat--No sore throat, no dysphagia, no hoarseness  CV:  No chest pain,dyspnea,palpitations or edema.  RESP:  No sob,cough,wheezing or hemoptysis.  GI: No dysphagia, heartburn,abdominal pain, nausea, vomiting, diarrhea or constipation.       No melena, jaundice, bleeding, incontinence or change in bowel habits.  :  No dysuria, polyuria, hematuria, incontinence, or nocturia.  MS:  No joint swelling, myalgias, or arthralgias.  NEURO:  No seizures, syncope, paralysis, tremor, or weakness.  SKIN: as above  PSYCH: Mood fine.    Vitals:    03/21/19 1030   BP: 118/70   BP Location: Left arm   Patient Position: Sitting   BP Cuff Size: Adult   Pulse: 60   Resp: 12   Temp: 36.3 °C (97.4 °F)   TempSrc: Temporal   SpO2: 99%   Weight: 66.7 kg (147 lb)   Height: 1.778 m (5' 10\")     Physical Exam:  Gen: Well developed, well nourished. No acute distress.  Neck:  Supple, no adenopathy or thyromegaly.  Heart:  Regular rate and rhythm.  Normal S1, S2. No murmur, gallop or rub.  Lungs:  Clear, No wheezes,rales or rhonchi.  Extremities:  No edema.  Skin: He has multiple benign-appearing nevi on his back and chest.  He has several elevated rough lesions, brown to gray which are consistent with seborrheic keratoses on his face, neck, trunk.  On his forehead he has 4 lesions which are erythematous, rough, ranging in size from 2 mm to 4 mm, and consistent with actinic keratoses.  Psych: Mood and affect are appropriate.    Lab Results   Component Value Date/Time    CHOLSTRLTOT 182 03/15/2019 08:05 AM     (H) 03/15/2019 08:05 AM    HDL 71 03/15/2019 08:05 AM    TRIGLYCERIDE 53 03/15/2019 08:05 AM    "    Lab Results   Component Value Date/Time    SODIUM 133 (L) 03/15/2019 08:05 AM    POTASSIUM 4.4 03/15/2019 08:05 AM    CHLORIDE 102 03/15/2019 08:05 AM    CO2 24 03/15/2019 08:05 AM    GLUCOSE 112 (H) 03/15/2019 08:05 AM    BUN 10 03/15/2019 08:05 AM    CREATININE 1.03 03/15/2019 08:05 AM     Lab Results   Component Value Date/Time    ALKPHOSPHAT 50 03/15/2019 08:05 AM    ASTSGOT 17 03/15/2019 08:05 AM    ALTSGPT 14 03/15/2019 08:05 AM    TBILIRUBIN 0.6 03/15/2019 08:05 AM        Lab Results   Component Value Date/Time    WBC 5.1 03/15/2019 08:05 AM    RBC 4.36 (L) 03/15/2019 08:05 AM    HEMOGLOBIN 14.4 03/15/2019 08:05 AM    HEMATOCRIT 43.6 03/15/2019 08:05 AM    .0 (H) 03/15/2019 08:05 AM    MCH 33.0 03/15/2019 08:05 AM    MCHC 33.0 (L) 03/15/2019 08:05 AM    MPV 8.8 (L) 03/15/2019 08:05 AM    NEUTSPOLYS 43.40 (L) 03/15/2019 08:05 AM    LYMPHOCYTES 42.50 (H) 03/15/2019 08:05 AM    MONOCYTES 9.90 03/15/2019 08:05 AM    EOSINOPHILS 3.20 03/15/2019 08:05 AM    BASOPHILS 0.80 03/15/2019 08:05 AM            Assessment/Plan:  1. AK (actinic keratosis) ..  4 lesions above were treated today with cryotherapy.  He is instructed in wound care and will call for any concerns.  Also encouraged sun protection.    2. SK (seborrheic keratosis).  4 elevated rough lesions ranging in size from 2-3 mm on his neck and anterior chest are treated today with cryotherapy.  These ones catch on clothing and are sometimes painful.  He is again instructed in wound care.    3. Vitamin D deficiency.  Recommend 2000 international units of vitamin D3 daily    4. Hypothyroidism, unspecified type.  Continue on present replacement therapy.  Encouraged him to try and take it daily    5. Elevated glucose.  Continue with healthy diet, exercise, maintaining healthy weight.  We will continue to monitor every 6 months.      Follow-up 6 months and as needed.

## 2019-04-11 ENCOUNTER — OFFICE VISIT (OUTPATIENT)
Dept: INTERNAL MEDICINE | Facility: IMAGING CENTER | Age: 74
End: 2019-04-11
Payer: MEDICARE

## 2019-04-11 VITALS
TEMPERATURE: 98.6 F | DIASTOLIC BLOOD PRESSURE: 62 MMHG | BODY MASS INDEX: 21.05 KG/M2 | SYSTOLIC BLOOD PRESSURE: 102 MMHG | WEIGHT: 147 LBS | RESPIRATION RATE: 15 BRPM | HEART RATE: 61 BPM | OXYGEN SATURATION: 98 % | HEIGHT: 70 IN

## 2019-04-11 DIAGNOSIS — L91.8 CUTANEOUS SKIN TAGS: ICD-10-CM

## 2019-04-11 DIAGNOSIS — L82.1 SK (SEBORRHEIC KERATOSIS): ICD-10-CM

## 2019-04-11 PROCEDURE — 11200 RMVL SKIN TAGS UP TO&INC 15: CPT | Mod: 59 | Performed by: FAMILY MEDICINE

## 2019-04-11 PROCEDURE — 17110 DESTRUCTION B9 LES UP TO 14: CPT | Performed by: FAMILY MEDICINE

## 2019-04-11 NOTE — PROGRESS NOTES
"Chief Complaint   Patient presents with   • Other     Skin tag removal on anterior chest.        HISTORY OF PRESENT ILLNESS: Patient is a 73 y.o. male established patient who presents today to see about having several skin lesions removed.    He has several skin tags along his neckline that irritates and catch on clothing.  He has a few rough spots on both of his sides in the mid axillary line which also rub on clothing, they itch.  He would like to have some of these removed today.  No history of melanoma.    Patient Active Problem List    Diagnosis Date Noted   • Vitamin D deficiency 02/02/2018   • DDD (degenerative disc disease), lumbar 05/09/2017   • Lumbar spinal stenosis 05/09/2017   • Hypothyroidism 05/09/2017   • Elevated glucose 05/09/2017   • DDD (degenerative disc disease), cervical 10/16/2016   • Hernia, inguinal, left 04/08/2016     Current Outpatient Prescriptions on File Prior to Visit   Medication Sig Dispense Refill   • levothyroxine (SYNTHROID) 75 MCG Tab Take 1 Tab by mouth Every morning on an empty stomach. 30 Tab 6   • cyanocobalamin (VITAMIN B-12) 500 MCG Tab Take 500 mcg by mouth every day.     • SAW PALMETTO, SERENOA REPENS, PO Take  by mouth.     • GINSENG PO Take  by mouth.     • Flaxseed, Linseed, (FLAX SEED OIL PO) Take  by mouth every day.     • VITAMIN E PO Take  by mouth every day.       No current facility-administered medications on file prior to visit.        Past medical, surgical, family, and social history is reviewed and updated in Epic chart by me today.   Medications and allergies reviewed and updated in Epic chart by me today.     REVIEW OF SYSTEMS:  GENERAL: No fatigue, no weight loss.  Skin :  As above    Vitals:    04/11/19 1036   BP: 102/62   BP Location: Left arm   Patient Position: Sitting   BP Cuff Size: Adult   Pulse: 61   Resp: 15   Temp: 37 °C (98.6 °F)   TempSrc: Temporal   SpO2: 98%   Weight: 66.7 kg (147 lb)   Height: 1.778 m (5' 10\")     Physical Exam:  Gen: " Well developed, well nourished. No acute distress.  Neck:  Supple, no adenopathy or thyromegaly.  Heart:  Regular rate and rhythm.  Normal S1, S2. No murmur, gallop or rub.  Lungs:  Clear, No wheezes,rales or rhonchi.  Extremities:  No edema.  Skin: He has 2 skin tags on his neckline anteriorly which do catch on his collar.  They are both approximately 2 mm in size.  He has multiple seborrheic keratoses scattered on his trunk.  He has several rough ones on his sides and back that bother him with itching and catch on clothing.  He has multiple flat tan lesions scattered on his trunk and arms.  No suspicious lesions.  Psych: Mood and affect are appropriate.    Assessment/Plan:  1. Cutaneous skin tags.  The 2 skin tags at his neckline are treated today with cryotherapy and he is instructed in wound care.    2. SK (seborrheic keratosis).  Approximately 8 seborrheic keratoses on his back and sides are treated today with cryotherapy.    He is encouraged to use good sun coverage.  Call for any concerns.

## 2019-06-13 RX ORDER — LEVOTHYROXINE SODIUM 0.05 MG/1
TABLET ORAL
Qty: 90 TAB | Refills: 3 | Status: SHIPPED | OUTPATIENT
Start: 2019-06-13 | End: 2020-07-23

## 2019-09-23 DIAGNOSIS — M51.36 DDD (DEGENERATIVE DISC DISEASE), LUMBAR: ICD-10-CM

## 2019-09-23 DIAGNOSIS — M50.30 DDD (DEGENERATIVE DISC DISEASE), CERVICAL: Chronic | ICD-10-CM

## 2019-10-23 ENCOUNTER — NON-PROVIDER VISIT (OUTPATIENT)
Dept: INTERNAL MEDICINE | Facility: IMAGING CENTER | Age: 74
End: 2019-10-23
Payer: MEDICARE

## 2019-10-23 DIAGNOSIS — R73.09 ELEVATED GLUCOSE: ICD-10-CM

## 2019-10-23 LAB
HBA1C MFR BLD: 5.5 % (ref 0–5.6)
INT CON NEG: NORMAL
INT CON POS: NORMAL

## 2019-10-23 PROCEDURE — 83036 HEMOGLOBIN GLYCOSYLATED A1C: CPT | Performed by: FAMILY MEDICINE

## 2019-10-30 ENCOUNTER — OFFICE VISIT (OUTPATIENT)
Dept: INTERNAL MEDICINE | Facility: IMAGING CENTER | Age: 74
End: 2019-10-30
Payer: MEDICARE

## 2019-10-30 VITALS
RESPIRATION RATE: 14 BRPM | SYSTOLIC BLOOD PRESSURE: 108 MMHG | TEMPERATURE: 97.4 F | WEIGHT: 144 LBS | BODY MASS INDEX: 20.62 KG/M2 | HEART RATE: 64 BPM | OXYGEN SATURATION: 97 % | HEIGHT: 70 IN | DIASTOLIC BLOOD PRESSURE: 64 MMHG

## 2019-10-30 DIAGNOSIS — M54.2 NECK PAIN: ICD-10-CM

## 2019-10-30 DIAGNOSIS — M50.30 DDD (DEGENERATIVE DISC DISEASE), CERVICAL: Chronic | ICD-10-CM

## 2019-10-30 DIAGNOSIS — L57.0 AK (ACTINIC KERATOSIS): ICD-10-CM

## 2019-10-30 DIAGNOSIS — Z23 NEED FOR INFLUENZA VACCINATION: ICD-10-CM

## 2019-10-30 DIAGNOSIS — H11.32 CONJUNCTIVAL HEMORRHAGE OF LEFT EYE: ICD-10-CM

## 2019-10-30 DIAGNOSIS — Z23 NEED FOR HEPATITIS A AND B VACCINATION: ICD-10-CM

## 2019-10-30 DIAGNOSIS — L82.1 SK (SEBORRHEIC KERATOSIS): ICD-10-CM

## 2019-10-30 DIAGNOSIS — R73.09 ELEVATED GLUCOSE: ICD-10-CM

## 2019-10-30 DIAGNOSIS — M51.36 DDD (DEGENERATIVE DISC DISEASE), LUMBAR: Chronic | ICD-10-CM

## 2019-10-30 PROCEDURE — 90636 HEP A/HEP B VACC ADULT IM: CPT | Performed by: FAMILY MEDICINE

## 2019-10-30 PROCEDURE — 99214 OFFICE O/P EST MOD 30 MIN: CPT | Mod: 25 | Performed by: FAMILY MEDICINE

## 2019-10-30 PROCEDURE — 90472 IMMUNIZATION ADMIN EACH ADD: CPT | Performed by: FAMILY MEDICINE

## 2019-10-30 PROCEDURE — 17003 DESTRUCT PREMALG LES 2-14: CPT | Performed by: FAMILY MEDICINE

## 2019-10-30 PROCEDURE — G0008 ADMIN INFLUENZA VIRUS VAC: HCPCS | Performed by: FAMILY MEDICINE

## 2019-10-30 PROCEDURE — 17000 DESTRUCT PREMALG LESION: CPT | Performed by: FAMILY MEDICINE

## 2019-10-30 PROCEDURE — 90662 IIV NO PRSV INCREASED AG IM: CPT | Performed by: FAMILY MEDICINE

## 2019-10-30 NOTE — PROGRESS NOTES
Chief Complaint   Patient presents with   • Neck Pain   • Back Pain   • Skin Lesion       HISTORY OF PRESENT ILLNESS: Patient is a 73 y.o. male established patient who presents today to follow-up on recent lab work.  He has a history of elevated glucose.  His A1c has been elevated on occasion also.  It is never been higher than 6.4.  He had a recent A1c and it is very good at 5.5.  He is due for complete blood work in March.  He is working on watching the sugar in his diet.  He is in eliminating fruit juices.    He is also here to follow-up on chronic neck and back pain.  He did start mountain biking and has taken 2 falls off his bike.  He was wearing helmets both times.  But with his second fall he hit his head.  No loss of consciousness.  He does think it aggravated his neck pain.  He has cervical degenerative disc disease and return to his physical therapist.  He has been doing physical therapy and is seeing significant improvement.  He denies any numbness or pain into her arms or hands.  No weakness.    He also has lumbar degenerative disc disease and spinal stenosis.  He continues with physical therapy.  He states he seems to be doing fine.  He was having some weakness in his legs but that has improved with physical therapy.  He is not interested in surgery or injections.  Denies any bowel or bladder issues.    He also has several skin lesions.  He has 2 on his scalp that are red and scaly and have been present for at least 3 to 4 months.  He has several seborrheic keratoses scattered on his back and chest and his temple areas bilaterally.  These do itch and irritate and he would like them treated today.    He also has a broken blood vessel in his left eye.  He noticed it today.  There is no pain or visual changes.  He is not on aspirin regularly      Patient Active Problem List    Diagnosis Date Noted   • Vitamin D deficiency 02/02/2018   • DDD (degenerative disc disease), lumbar 05/09/2017   • Lumbar spinal  "stenosis 05/09/2017   • Hypothyroidism 05/09/2017   • Elevated glucose 05/09/2017   • DDD (degenerative disc disease), cervical 10/16/2016   • Hernia, inguinal, left 04/08/2016     Current Outpatient Medications on File Prior to Visit   Medication Sig Dispense Refill   • levothyroxine (SYNTHROID) 50 MCG Tab TAKE 1 TABLET BY MOUTH EVERY MORNING ON AN EMPTY STOMACH 90 Tab 3   • cyanocobalamin (VITAMIN B-12) 500 MCG Tab Take 500 mcg by mouth every day.     • SAW PALMETTO, SERENOA REPENS, PO Take  by mouth.     • GINSENG PO Take  by mouth.     • Flaxseed, Linseed, (FLAX SEED OIL PO) Take  by mouth every day.     • VITAMIN E PO Take  by mouth every day.       No current facility-administered medications on file prior to visit.          Past medical, surgical, family, and social history is reviewed and updated in Epic chart by me today.   Medications and allergies reviewed and updated in Epic chart by me today.     REVIEW OF SYSTEMS:  GENERAL: No fatigue, no weight loss.  HEENT:  Ears--no earache, no change in hearing, no dizziness, no tinnitus.                 Eyes--no blurred vision, no discharge or pain. Left eye as above                 Throat--No sore throat, no dysphagia, no hoarseness  CV:  No chest pain,dyspnea,palpitations or edema.  RESP:  No sob,cough,wheezing or hemoptysis.  GI: No dysphagia, heartburn,abdominal pain, nausea, vomiting, diarrhea or constipation.       No melena, jaundice, bleeding, incontinence or change in bowel habits.  :  No dysuria, polyuria, hematuria, incontinence, or nocturia.  MS: Neck and back pain.  NEURO:  No seizures, syncope, paralysis, tremor, or weakness.  SKIN: As above.  PSYCH: Mood fine.    Vitals:    10/30/19 1000   BP: 108/64   Pulse: 64   Resp: 14   Temp: 36.3 °C (97.4 °F)   TempSrc: Temporal   SpO2: 97%   Weight: 65.3 kg (144 lb)   Height: 1.778 m (5' 10\")     Physical Exam:  GENERAL;  WD/WN, No acute distress.  Head is normocephalic and atraumatic.  HEENT:  VICKI " EOMI,  no icterus.  His left medial sclera has a conjunctival hemorrhage.                 TM's normal bilat.                 Nasal mucosa normal                 Pharynx clear. No exudate.  NECK: Supple with no adenopathy or thyromegaly.  LUNGS: Clear with no rales, rhonchi, or wheezes.  COR: RR with no murmur, gallop or rub.  Skin: Scalp with 2 lesions on top, both 3 to 4 mm, erythematous and scaling and consistent with actinic keratoses.  He has multiple scattered seborrheic keratoses that range from 2mm to 6 mm on his trunk.  On each temple he has 3 elevated dark tan rough 2 to 3 mm lesions which are consistent with seborrheic keratoses  EXT: No edema.        Assessment/Plan:  1. Neck pain.  Improved with physical therapy.  He will continue PT once weekly.    2. DDD (degenerative disc disease), cervical     3. DDD (degenerative disc disease), lumbar.  His back symptoms have also improved and he will continue with PT once a week.    4. SK (seborrheic keratosis).  10 lesions are treated today with cryotherapy..  This includes the lesions on his temples as well as several lesions on his trunk.    5. AK (actinic keratosis).  The 2 lesions on his scalp are treated today with cryotherapy.  He is instructed in wound care.    6. Conjunctival hemorrhage of left eye.  Reassurance and observation.    7. Elevated glucose.  Continue with healthy diet, exercise, decreased sugar.    8. Need for influenza vaccination  INFLUENZA VACCINE, HIGH DOSE (65+ ONLY)   9. Need for hepatitis A and B vaccination  TWINRIX HepA/HepB IM     Follow-up in March.

## 2019-12-11 ENCOUNTER — NON-PROVIDER VISIT (OUTPATIENT)
Dept: INTERNAL MEDICINE | Facility: IMAGING CENTER | Age: 74
End: 2019-12-11
Payer: MEDICARE

## 2019-12-11 DIAGNOSIS — Z23 NEED FOR VACCINATION: ICD-10-CM

## 2019-12-11 PROCEDURE — 90471 IMMUNIZATION ADMIN: CPT | Performed by: FAMILY MEDICINE

## 2019-12-11 PROCEDURE — 90636 HEP A/HEP B VACC ADULT IM: CPT | Performed by: FAMILY MEDICINE

## 2020-06-09 ENCOUNTER — OFFICE VISIT (OUTPATIENT)
Dept: INTERNAL MEDICINE | Facility: IMAGING CENTER | Age: 75
End: 2020-06-09
Payer: MEDICARE

## 2020-06-09 VITALS
HEART RATE: 58 BPM | SYSTOLIC BLOOD PRESSURE: 122 MMHG | TEMPERATURE: 97.4 F | HEIGHT: 70 IN | WEIGHT: 136 LBS | BODY MASS INDEX: 19.47 KG/M2 | RESPIRATION RATE: 14 BRPM | DIASTOLIC BLOOD PRESSURE: 70 MMHG | OXYGEN SATURATION: 97 %

## 2020-06-09 DIAGNOSIS — D64.9 MILD ANEMIA: ICD-10-CM

## 2020-06-09 DIAGNOSIS — E03.9 HYPOTHYROIDISM, UNSPECIFIED TYPE: ICD-10-CM

## 2020-06-09 DIAGNOSIS — R73.09 ELEVATED GLUCOSE: ICD-10-CM

## 2020-06-09 DIAGNOSIS — Z12.5 SCREENING FOR MALIGNANT NEOPLASM OF PROSTATE: ICD-10-CM

## 2020-06-09 DIAGNOSIS — E78.00 ELEVATED CHOLESTEROL: ICD-10-CM

## 2020-06-09 DIAGNOSIS — E55.9 VITAMIN D DEFICIENCY: ICD-10-CM

## 2020-06-09 DIAGNOSIS — Z23 NEED FOR VACCINATION: ICD-10-CM

## 2020-06-09 DIAGNOSIS — L57.0 AK (ACTINIC KERATOSIS): ICD-10-CM

## 2020-06-09 PROCEDURE — 17000 DESTRUCT PREMALG LESION: CPT | Performed by: FAMILY MEDICINE

## 2020-06-09 PROCEDURE — 90636 HEP A/HEP B VACC ADULT IM: CPT | Performed by: FAMILY MEDICINE

## 2020-06-09 PROCEDURE — 99212 OFFICE O/P EST SF 10 MIN: CPT | Mod: 25 | Performed by: FAMILY MEDICINE

## 2020-06-09 PROCEDURE — 17003 DESTRUCT PREMALG LES 2-14: CPT | Performed by: FAMILY MEDICINE

## 2020-06-09 PROCEDURE — 90471 IMMUNIZATION ADMIN: CPT | Performed by: FAMILY MEDICINE

## 2020-06-09 ASSESSMENT — FIBROSIS 4 INDEX: FIB4 SCORE: 1.19

## 2020-06-15 NOTE — PROGRESS NOTES
Chief Complaint   Patient presents with   • Skin Lesion       HISTORY OF PRESENT ILLNESS: Patient is a 74 y.o. male established patient who presents today complaining of some skin lesions on his scalp.  He has had actinic keratosis before.  He has some red dry scaly lesions on his scalp.  He does use sunblock.  He would like to have these treated today.    He is also due for lab work.      Patient Active Problem List    Diagnosis Date Noted   • Vitamin D deficiency 02/02/2018   • DDD (degenerative disc disease), lumbar 05/09/2017   • Lumbar spinal stenosis 05/09/2017   • Hypothyroidism 05/09/2017   • Elevated glucose 05/09/2017   • DDD (degenerative disc disease), cervical 10/16/2016   • Hernia, inguinal, left 04/08/2016     Current Outpatient Medications on File Prior to Visit   Medication Sig Dispense Refill   • levothyroxine (SYNTHROID) 50 MCG Tab TAKE 1 TABLET BY MOUTH EVERY MORNING ON AN EMPTY STOMACH 90 Tab 3   • cyanocobalamin (VITAMIN B-12) 500 MCG Tab Take 500 mcg by mouth every day.     • SAW PALMETTO, SERENOA REPENS, PO Take  by mouth.     • GINSENG PO Take  by mouth.     • Flaxseed, Linseed, (FLAX SEED OIL PO) Take  by mouth every day.     • VITAMIN E PO Take  by mouth every day.       No current facility-administered medications on file prior to visit.        Past medical, surgical, family, and social history is reviewed and updated in Epic chart by me today.   Medications and allergies reviewed and updated in Epic chart by me today.     REVIEW OF SYSTEMS:  GENERAL: No fatigue, no weight loss.  HEENT:  Ears--no earache, no change in hearing, no dizziness, no tinnitus.                 Eyes--no blurred vision, no discharge or pain                 Throat--No sore throat, no dysphagia, no hoarseness  CV:  No chest pain,dyspnea,palpitations or edema.  RESP:  No sob,cough,wheezing or hemoptysis.  GI: No dysphagia, heartburn,abdominal pain, nausea, vomiting, diarrhea or constipation.       No melena, jaundice,  "bleeding, incontinence or change in bowel habits.  :  No dysuria, polyuria, hematuria, incontinence, or nocturia.  MS: Chronic neck and back pain.  NEURO:  No seizures, syncope, paralysis, tremor, or weakness.  SKIN: As above  PSYCH: Mood fine.    Vitals:    06/09/20 0800   BP: 122/70   Pulse: (!) 58   Resp: 14   Temp: 36.3 °C (97.4 °F)   TempSrc: Temporal   SpO2: 97%   Weight: 61.7 kg (136 lb)   Height: 1.778 m (5' 10\")     Physical Exam:  Gen: Well developed, well nourished. No acute distress.  Neck:  Supple, no adenopathy or thyromegaly.  Heart:  Regular rate and rhythm.  Normal S1, S2. No murmur, gallop or rub.  Lungs:  Clear, No wheezes,rales or rhonchi.  Extremities:  No edema.  Skin: He has many scattered seborrheic keratoses.  He has several erythematous scaling lesions on his scalp which are consistent with actinic keratoses.  These are all from 1 to 3 mm in size.  Psych: Mood and affect are appropriate.        Assessment/Plan:  1. AK (actinic keratosis).  6 lesions on his scalp are treated today with cryotherapy.  He is instructed in wound care.  He is also encouraged to use sunblock and wear hats.    2. Need for vaccination.  He did receive Twinrix vaccine today.  He will follow-up in 1 month for shingles and Tdap. TWINRIX HepA/HepB IM   3. Elevated glucose.  Lab work is ordered. Comp Metabolic Panel    HEMOGLOBIN A1C   4. Vitamin D deficiency  VITAMIN D,25 HYDROXY   5. Hypothyroidism, unspecified type  TSH    FREE THYROXINE   6. Elevated cholesterol  Comp Metabolic Panel    Lipid Profile   7. Mild anemia  CBC WITH DIFFERENTIAL   8. Screening for malignant neoplasm of prostate  PROSTATE SPECIFIC AG DIAGNOSTIC        "

## 2020-07-01 ENCOUNTER — NON-PROVIDER VISIT (OUTPATIENT)
Dept: INTERNAL MEDICINE | Facility: IMAGING CENTER | Age: 75
End: 2020-07-01
Payer: MEDICARE

## 2020-07-01 ENCOUNTER — HOSPITAL ENCOUNTER (OUTPATIENT)
Facility: MEDICAL CENTER | Age: 75
End: 2020-07-01
Attending: FAMILY MEDICINE
Payer: MEDICARE

## 2020-07-01 ENCOUNTER — OFFICE VISIT (OUTPATIENT)
Dept: INTERNAL MEDICINE | Facility: IMAGING CENTER | Age: 75
End: 2020-07-01
Payer: MEDICARE

## 2020-07-01 VITALS
DIASTOLIC BLOOD PRESSURE: 64 MMHG | HEART RATE: 54 BPM | RESPIRATION RATE: 17 BRPM | HEIGHT: 70 IN | SYSTOLIC BLOOD PRESSURE: 131 MMHG | TEMPERATURE: 98.3 F | OXYGEN SATURATION: 95 % | WEIGHT: 136.02 LBS | BODY MASS INDEX: 19.47 KG/M2

## 2020-07-01 VITALS
HEIGHT: 70 IN | TEMPERATURE: 98.3 F | SYSTOLIC BLOOD PRESSURE: 131 MMHG | OXYGEN SATURATION: 95 % | DIASTOLIC BLOOD PRESSURE: 64 MMHG | HEART RATE: 54 BPM | BODY MASS INDEX: 19.47 KG/M2 | RESPIRATION RATE: 17 BRPM | WEIGHT: 136.02 LBS

## 2020-07-01 DIAGNOSIS — R73.09 ELEVATED GLUCOSE: ICD-10-CM

## 2020-07-01 DIAGNOSIS — E78.00 ELEVATED CHOLESTEROL: ICD-10-CM

## 2020-07-01 DIAGNOSIS — M48.061 SPINAL STENOSIS OF LUMBAR REGION, UNSPECIFIED WHETHER NEUROGENIC CLAUDICATION PRESENT: Chronic | ICD-10-CM

## 2020-07-01 DIAGNOSIS — L57.0 AK (ACTINIC KERATOSIS): ICD-10-CM

## 2020-07-01 DIAGNOSIS — L82.1 SK (SEBORRHEIC KERATOSIS): ICD-10-CM

## 2020-07-01 DIAGNOSIS — M51.36 DDD (DEGENERATIVE DISC DISEASE), LUMBAR: Chronic | ICD-10-CM

## 2020-07-01 DIAGNOSIS — Z12.5 SCREENING FOR MALIGNANT NEOPLASM OF PROSTATE: ICD-10-CM

## 2020-07-01 DIAGNOSIS — E55.9 VITAMIN D DEFICIENCY: ICD-10-CM

## 2020-07-01 DIAGNOSIS — E03.9 HYPOTHYROIDISM, UNSPECIFIED TYPE: ICD-10-CM

## 2020-07-01 DIAGNOSIS — D64.9 MILD ANEMIA: ICD-10-CM

## 2020-07-01 LAB
ALBUMIN SERPL BCP-MCNC: 4.6 G/DL (ref 3.2–4.9)
ALBUMIN/GLOB SERPL: 1.7 G/DL
ALP SERPL-CCNC: 51 U/L (ref 30–99)
ALT SERPL-CCNC: 12 U/L (ref 2–50)
ANION GAP SERPL CALC-SCNC: 14 MMOL/L (ref 7–16)
AST SERPL-CCNC: 17 U/L (ref 12–45)
BASOPHILS # BLD AUTO: 0.6 % (ref 0–1.8)
BASOPHILS # BLD: 0.04 K/UL (ref 0–0.12)
BILIRUB SERPL-MCNC: 0.6 MG/DL (ref 0.1–1.5)
BUN SERPL-MCNC: 13 MG/DL (ref 8–22)
CALCIUM SERPL-MCNC: 9.3 MG/DL (ref 8.5–10.5)
CHLORIDE SERPL-SCNC: 95 MMOL/L (ref 96–112)
CHOLEST SERPL-MCNC: 169 MG/DL (ref 100–199)
CO2 SERPL-SCNC: 23 MMOL/L (ref 20–33)
CREAT SERPL-MCNC: 0.86 MG/DL (ref 0.5–1.4)
EOSINOPHIL # BLD AUTO: 0.07 K/UL (ref 0–0.51)
EOSINOPHIL NFR BLD: 1 % (ref 0–6.9)
ERYTHROCYTE [DISTWIDTH] IN BLOOD BY AUTOMATED COUNT: 47.4 FL (ref 35.9–50)
EST. AVERAGE GLUCOSE BLD GHB EST-MCNC: 128 MG/DL
GLOBULIN SER CALC-MCNC: 2.7 G/DL (ref 1.9–3.5)
GLUCOSE SERPL-MCNC: 111 MG/DL (ref 65–99)
HBA1C MFR BLD: 6.1 % (ref 0–5.6)
HCT VFR BLD AUTO: 41.2 % (ref 42–52)
HDLC SERPL-MCNC: 71 MG/DL
HGB BLD-MCNC: 13.9 G/DL (ref 14–18)
IMM GRANULOCYTES # BLD AUTO: 0.03 K/UL (ref 0–0.11)
IMM GRANULOCYTES NFR BLD AUTO: 0.4 % (ref 0–0.9)
LDLC SERPL CALC-MCNC: 90 MG/DL
LYMPHOCYTES # BLD AUTO: 1.93 K/UL (ref 1–4.8)
LYMPHOCYTES NFR BLD: 26.6 % (ref 22–41)
MCH RBC QN AUTO: 33.8 PG (ref 27–33)
MCHC RBC AUTO-ENTMCNC: 33.7 G/DL (ref 33.7–35.3)
MCV RBC AUTO: 100.2 FL (ref 81.4–97.8)
MONOCYTES # BLD AUTO: 0.6 K/UL (ref 0–0.85)
MONOCYTES NFR BLD AUTO: 8.3 % (ref 0–13.4)
NEUTROPHILS # BLD AUTO: 4.58 K/UL (ref 1.82–7.42)
NEUTROPHILS NFR BLD: 63.1 % (ref 44–72)
NRBC # BLD AUTO: 0 K/UL
NRBC BLD-RTO: 0 /100 WBC
PLATELET # BLD AUTO: 278 K/UL (ref 164–446)
PMV BLD AUTO: 8.6 FL (ref 9–12.9)
POTASSIUM SERPL-SCNC: 4.2 MMOL/L (ref 3.6–5.5)
PROT SERPL-MCNC: 7.3 G/DL (ref 6–8.2)
RBC # BLD AUTO: 4.11 M/UL (ref 4.7–6.1)
SODIUM SERPL-SCNC: 132 MMOL/L (ref 135–145)
TRIGL SERPL-MCNC: 38 MG/DL (ref 0–149)
WBC # BLD AUTO: 7.3 K/UL (ref 4.8–10.8)

## 2020-07-01 PROCEDURE — 84153 ASSAY OF PSA TOTAL: CPT

## 2020-07-01 PROCEDURE — 85025 COMPLETE CBC W/AUTO DIFF WBC: CPT

## 2020-07-01 PROCEDURE — 84439 ASSAY OF FREE THYROXINE: CPT

## 2020-07-01 PROCEDURE — 80061 LIPID PANEL: CPT

## 2020-07-01 PROCEDURE — 17000 DESTRUCT PREMALG LESION: CPT | Performed by: FAMILY MEDICINE

## 2020-07-01 PROCEDURE — 83036 HEMOGLOBIN GLYCOSYLATED A1C: CPT

## 2020-07-01 PROCEDURE — 17110 DESTRUCTION B9 LES UP TO 14: CPT | Mod: 59 | Performed by: FAMILY MEDICINE

## 2020-07-01 PROCEDURE — 80053 COMPREHEN METABOLIC PANEL: CPT

## 2020-07-01 PROCEDURE — 82306 VITAMIN D 25 HYDROXY: CPT

## 2020-07-01 PROCEDURE — 17003 DESTRUCT PREMALG LES 2-14: CPT | Performed by: FAMILY MEDICINE

## 2020-07-01 PROCEDURE — 84443 ASSAY THYROID STIM HORMONE: CPT

## 2020-07-01 ASSESSMENT — FIBROSIS 4 INDEX
FIB4 SCORE: 1.19
FIB4 SCORE: 1.19

## 2020-07-01 NOTE — PROGRESS NOTES
Chief Complaint   Patient presents with   • Skin Lesion   • Paperwork     DMV Placard       HISTORY OF PRESENT ILLNESS: Patient is a 74 y.o. male established patient who presents today complaining of multiple skin lesions that are bothering him.  He spends much time in the sun in his youth.  He has numerous seborrheic keratoses on his back and trunk that do catch on clothing.  They itch.  Sometimes they bleed.  He has a few red scaly spots on his scalp.    Also is requesting a DMV placard.  He does have significant's degenerative disc disease and spinal stenosis.  He has increased pain if he walks long distances.      Patient Active Problem List    Diagnosis Date Noted   • Vitamin D deficiency 02/02/2018   • DDD (degenerative disc disease), lumbar 05/09/2017   • Lumbar spinal stenosis 05/09/2017   • Hypothyroidism 05/09/2017   • Elevated glucose 05/09/2017   • DDD (degenerative disc disease), cervical 10/16/2016   • Hernia, inguinal, left 04/08/2016     Current Outpatient Medications on File Prior to Visit   Medication Sig Dispense Refill   • levothyroxine (SYNTHROID) 50 MCG Tab TAKE 1 TABLET BY MOUTH EVERY MORNING ON AN EMPTY STOMACH 90 Tab 3   • cyanocobalamin (VITAMIN B-12) 500 MCG Tab Take 500 mcg by mouth every day.     • SAW PALMETTO, SERENOA REPENS, PO Take  by mouth.     • GINSENG PO Take  by mouth.     • Flaxseed, Linseed, (FLAX SEED OIL PO) Take  by mouth every day.     • VITAMIN E PO Take  by mouth every day.       No current facility-administered medications on file prior to visit.          Past medical, surgical, family, and social history is reviewed and updated in Epic chart by me today.   Medications and allergies reviewed and updated in Epic chart by me today.     REVIEW OF SYSTEMS:  GENERAL: No fatigue, no weight loss.  HEENT:  Ears--no earache, no change in hearing, no dizziness, no tinnitus.                 Eyes--no blurred vision, no discharge or pain                 Throat--No sore throat, no  "dysphagia, no hoarseness  CV:  No chest pain,dyspnea,palpitations or edema.  RESP:  No sob,cough,wheezing or hemoptysis.  GI: No dysphagia, heartburn,abdominal pain, nausea, vomiting, diarrhea or constipation.       No melena, jaundice, bleeding, incontinence or change in bowel habits.  :  No dysuria, polyuria, hematuria, incontinence, or nocturia.  MS:  Chronic back pain  NEURO:  No seizures, syncope, paralysis, tremor, or weakness.  SKIN: as above  PSYCH: Mood fine.    Vitals:    07/01/20 0845   BP: 131/64   BP Location: Left arm   Patient Position: Sitting   BP Cuff Size: Adult   Pulse: (!) 54   Resp: 17   Temp: 36.8 °C (98.3 °F)   TempSrc: Temporal   SpO2: 95%   Weight: 61.7 kg (136 lb 0.4 oz)   Height: 1.778 m (5' 10\")     Physical Exam:  Gen: Well developed, well nourished. No acute distress.  Neck:  Supple, no adenopathy or thyromegaly.  Heart:  Regular rate and rhythm.  Normal S1, S2. No murmur, gallop or rub.  Lungs:  Clear, No wheezes,rales or rhonchi.  Extremities:  No edema.  Skin: He has three lesions on his scalp, all 2 mm to 3 mm in size.  Erythematous and scaling consistent with actinic keratoses.  He has multiple scattered lesions on his chest and back that are all consistent with seborrheic keratoses/solar keratoses.  They are 3 mm to 5 mm in size, elevated, rough, gray to dark brown in color.  Some are irritated appearing.  Psych: Mood and affect are appropriate.      Assessment/Plan:  1. AK (actinic keratosis), scalp.  3 lesions above are treated today with cryotherapy.  He is instructed in wound care.  Also instructed to wear hat and sun protection.    2. SK (seborrheic keratosis).  8 lesions on his chest and trunk are treated today with cryotherapy.  He again is instructed in wound care is encouraged to call for any concerns.    3. Spinal stenosis of lumbar region, unspecified whether neurogenic claudication present   due to his chronic back pain and difficulty walking long distances, I did " fill out a form for a DMV disabled placard   4. DDD (degenerative disc disease), lumbar

## 2020-07-02 LAB
25(OH)D3 SERPL-MCNC: 198 NG/ML (ref 30–100)
PSA SERPL-MCNC: 0.26 NG/ML (ref 0–4)
T4 FREE SERPL-MCNC: 1.4 NG/DL (ref 0.93–1.7)
TSH SERPL DL<=0.005 MIU/L-ACNC: 2.67 UIU/ML (ref 0.38–5.33)

## 2020-07-23 ENCOUNTER — OFFICE VISIT (OUTPATIENT)
Dept: INTERNAL MEDICINE | Facility: IMAGING CENTER | Age: 75
End: 2020-07-23
Payer: MEDICARE

## 2020-07-23 VITALS
OXYGEN SATURATION: 96 % | SYSTOLIC BLOOD PRESSURE: 126 MMHG | HEIGHT: 70 IN | RESPIRATION RATE: 14 BRPM | TEMPERATURE: 98.6 F | BODY MASS INDEX: 19.04 KG/M2 | WEIGHT: 133 LBS | DIASTOLIC BLOOD PRESSURE: 70 MMHG | HEART RATE: 69 BPM

## 2020-07-23 DIAGNOSIS — D64.9 MILD ANEMIA: ICD-10-CM

## 2020-07-23 DIAGNOSIS — E03.9 HYPOTHYROIDISM, UNSPECIFIED TYPE: ICD-10-CM

## 2020-07-23 DIAGNOSIS — Z23 NEED FOR VACCINATION: ICD-10-CM

## 2020-07-23 DIAGNOSIS — E55.9 VITAMIN D DEFICIENCY: ICD-10-CM

## 2020-07-23 DIAGNOSIS — R73.09 ELEVATED GLUCOSE: ICD-10-CM

## 2020-07-23 PROCEDURE — 90471 IMMUNIZATION ADMIN: CPT | Performed by: FAMILY MEDICINE

## 2020-07-23 PROCEDURE — 99214 OFFICE O/P EST MOD 30 MIN: CPT | Mod: 25 | Performed by: FAMILY MEDICINE

## 2020-07-23 PROCEDURE — 90750 HZV VACC RECOMBINANT IM: CPT | Performed by: FAMILY MEDICINE

## 2020-07-23 RX ORDER — LEVOTHYROXINE SODIUM 0.05 MG/1
TABLET ORAL
Qty: 90 TAB | Refills: 3 | Status: SHIPPED | OUTPATIENT
Start: 2020-07-23 | End: 2021-07-30

## 2020-07-23 ASSESSMENT — PATIENT HEALTH QUESTIONNAIRE - PHQ9: CLINICAL INTERPRETATION OF PHQ2 SCORE: 0

## 2020-07-23 ASSESSMENT — FIBROSIS 4 INDEX: FIB4 SCORE: 1.29

## 2020-07-23 NOTE — PROGRESS NOTES
Chief Complaint   Patient presents with   • Hypothyroidism     review labs       HISTORY OF PRESENT ILLNESS: Patient is a 74 y.o. male established patient who presents today to review his lab work.  He has hypothyroidism.  He is on levothyroxine 50 mcg daily.  He feels fine.  At that dose.  He denies any palpitations.  He has lost about 10 pounds this past year.  He attributes that to not going to the gym as much.  He is probably eating a little healthier as well.  His BMI is 19.  He still exercises at home.    His glucose is mildly elevated.  It has been around the 110 level for a couple years.  There is no family history of diabetes.  He is not on medication.  His A1c has been anywhere from 5.5-6.1.  Again he is trying to eat healthy and watches his sugar.    His vitamin D level is high.  He thinks he has been taking 5000 every day.      Patient Active Problem List    Diagnosis Date Noted   • Vitamin D deficiency 02/02/2018   • DDD (degenerative disc disease), lumbar 05/09/2017   • Lumbar spinal stenosis 05/09/2017   • Hypothyroidism 05/09/2017   • Elevated glucose 05/09/2017   • DDD (degenerative disc disease), cervical 10/16/2016   • Hernia, inguinal, left 04/08/2016     Current Outpatient Medications on File Prior to Visit   Medication Sig Dispense Refill   • levothyroxine (SYNTHROID) 50 MCG Tab TAKE 1 TABLET BY MOUTH EVERY MORNING ON AN EMPTY STOMACH 90 Tab 3   • cyanocobalamin (VITAMIN B-12) 500 MCG Tab Take 500 mcg by mouth every day.     • SAW PALMETTO, SERENOA REPENS, PO Take  by mouth.     • GINSENG PO Take  by mouth.     • Flaxseed, Linseed, (FLAX SEED OIL PO) Take  by mouth every day.     • VITAMIN E PO Take  by mouth every day.       No current facility-administered medications on file prior to visit.          Past medical, surgical, family, and social history is reviewed and updated in Epic chart by me today.   Medications and allergies reviewed and updated in Epic chart by me today.     REVIEW OF  "SYSTEMS:  GENERAL: No fatigue, 10 pound weight loss over the past year  HEENT:  Ears--no earache, no change in hearing, no dizziness, no tinnitus.                 Eyes--no blurred vision, no discharge or pain                 Throat--No sore throat, no dysphagia, no hoarseness  CV:  No chest pain,dyspnea,palpitations or edema.  RESP:  No sob,cough,wheezing or hemoptysis.  GI: No dysphagia, heartburn,abdominal pain, nausea, vomiting, diarrhea or constipation.       No melena, jaundice, bleeding, incontinence or change in bowel habits.  :  No dysuria, polyuria, hematuria, incontinence, or nocturia.  MS:  No joint swelling, myalgias, or arthralgias.  NEURO:  No seizures, syncope, paralysis, tremor, or weakness.  SKIN: Numerous skin lesions he like evaluated.  Several of them do bother him.  They are rough dry elevated and catch on clothing.  He has 3 on his legs today that he would like treated with liquid nitrogen.  PSYCH: Mood fine.    Vitals:    07/23/20 0800   BP: 126/70   Pulse: 69   Resp: 14   Temp: 37 °C (98.6 °F)   TempSrc: Temporal   SpO2: 96%   Weight: 60.3 kg (133 lb)   Height: 1.778 m (5' 10\")     Physical Exam:  Gen: Well developed, well nourished. No acute distress.  Neck:  Supple, no adenopathy or thyromegaly.  Heart:  Regular rate and rhythm.  Normal S1, S2. No murmur, gallop or rub.  Lungs:  Clear, No wheezes,rales or rhonchi.  Extremities:  No edema.  Skin: He has scattered nevi and seborrheic keratoses on his trunk, arms and legs.  The 3 that are bothering him are between 3 and 5 mm, tan/gray in color, elevated and rough and consistent with seborrheic keratoses.  These are on his legs.  Psych: Mood and affect are appropriate.    Lab Results   Component Value Date/Time    CHOLSTRLTOT 169 07/01/2020 08:20 AM    LDL 90 07/01/2020 08:20 AM    HDL 71 07/01/2020 08:20 AM    TRIGLYCERIDE 38 07/01/2020 08:20 AM       Lab Results   Component Value Date/Time    SODIUM 132 (L) 07/01/2020 08:20 AM    POTASSIUM " 4.2 07/01/2020 08:20 AM    CHLORIDE 95 (L) 07/01/2020 08:20 AM    CO2 23 07/01/2020 08:20 AM    GLUCOSE 111 (H) 07/01/2020 08:20 AM    BUN 13 07/01/2020 08:20 AM    CREATININE 0.86 07/01/2020 08:20 AM     Lab Results   Component Value Date/Time    ALKPHOSPHAT 51 07/01/2020 08:20 AM    ASTSGOT 17 07/01/2020 08:20 AM    ALTSGPT 12 07/01/2020 08:20 AM    TBILIRUBIN 0.6 07/01/2020 08:20 AM        Lab Results   Component Value Date/Time    WBC 7.3 07/01/2020 08:20 AM    RBC 4.11 (L) 07/01/2020 08:20 AM    HEMOGLOBIN 13.9 (L) 07/01/2020 08:20 AM    HEMATOCRIT 41.2 (L) 07/01/2020 08:20 AM    .2 (H) 07/01/2020 08:20 AM    MCH 33.8 (H) 07/01/2020 08:20 AM    MCHC 33.7 07/01/2020 08:20 AM    MPV 8.6 (L) 07/01/2020 08:20 AM    NEUTSPOLYS 63.10 07/01/2020 08:20 AM    LYMPHOCYTES 26.60 07/01/2020 08:20 AM    MONOCYTES 8.30 07/01/2020 08:20 AM    EOSINOPHILS 1.00 07/01/2020 08:20 AM    BASOPHILS 0.60 07/01/2020 08:20 AM      TSH is therapeutic at 2.67  Hemoglobin A1c 6.1.      Assessment/Plan:  1. Hypothyroidism, unspecified type.  Continue present replacement therapy.    2. Mild anemia.  He has stated about the same level of hemoglobin for the past couple of years.  He denies any blood loss and will continue to monitor.  He is due for colonoscopy next year.  He denies any GI symptoms.    3. Vitamin D deficiency.  Will decrease vitamin D to every other day.    4. Elevated glucose.  Counseled regarding diet, exercise.      5. Need for vaccination  Shingles Vaccine (Shingrix)   6.  Seborrheic keratoses.  The 3 lesions on his legs are treated today with cryotherapy.  He is instructed on wound care and call for concerns..  Follow-up in 3 months for blood work.

## 2020-09-29 ENCOUNTER — NON-PROVIDER VISIT (OUTPATIENT)
Dept: INTERNAL MEDICINE | Facility: IMAGING CENTER | Age: 75
End: 2020-09-29
Payer: MEDICARE

## 2020-09-29 DIAGNOSIS — Z23 NEED FOR INFLUENZA VACCINATION: ICD-10-CM

## 2020-09-29 PROCEDURE — 90662 IIV NO PRSV INCREASED AG IM: CPT | Performed by: FAMILY MEDICINE

## 2020-09-29 PROCEDURE — G0008 ADMIN INFLUENZA VIRUS VAC: HCPCS | Performed by: FAMILY MEDICINE

## 2020-10-14 ENCOUNTER — TELEPHONE (OUTPATIENT)
Dept: INTERNAL MEDICINE | Facility: IMAGING CENTER | Age: 75
End: 2020-10-14

## 2020-10-14 DIAGNOSIS — B00.1 COLD SORE: ICD-10-CM

## 2020-10-14 RX ORDER — ACYCLOVIR 400 MG/1
400 TABLET ORAL 3 TIMES DAILY
Qty: 21 TAB | Refills: 2 | Status: SHIPPED | OUTPATIENT
Start: 2020-10-14 | End: 2020-10-21

## 2020-10-14 NOTE — TELEPHONE ENCOUNTER
Chirag has a cold sore.  He has been prescribed acyclovir tabs in the past from a different MD and was wondering if he could have that same medication.

## 2020-12-17 ENCOUNTER — APPOINTMENT (OUTPATIENT)
Dept: INTERNAL MEDICINE | Facility: IMAGING CENTER | Age: 75
End: 2020-12-17

## 2021-01-15 DIAGNOSIS — Z23 NEED FOR VACCINATION: ICD-10-CM

## 2021-04-02 ENCOUNTER — TELEPHONE (OUTPATIENT)
Dept: SCHEDULING | Facility: IMAGING CENTER | Age: 76
End: 2021-04-02

## 2021-04-06 ENCOUNTER — APPOINTMENT (OUTPATIENT)
Dept: MEDICAL GROUP | Facility: IMAGING CENTER | Age: 76
End: 2021-04-06

## 2021-04-13 ENCOUNTER — OFFICE VISIT (OUTPATIENT)
Dept: MEDICAL GROUP | Facility: IMAGING CENTER | Age: 76
End: 2021-04-13
Payer: MEDICARE

## 2021-04-13 VITALS
HEIGHT: 70 IN | OXYGEN SATURATION: 98 % | TEMPERATURE: 98.8 F | WEIGHT: 139 LBS | BODY MASS INDEX: 19.9 KG/M2 | DIASTOLIC BLOOD PRESSURE: 56 MMHG | SYSTOLIC BLOOD PRESSURE: 110 MMHG | HEART RATE: 60 BPM | RESPIRATION RATE: 12 BRPM

## 2021-04-13 DIAGNOSIS — Z12.11 SCREENING FOR COLORECTAL CANCER: ICD-10-CM

## 2021-04-13 DIAGNOSIS — E55.9 VITAMIN D DEFICIENCY: ICD-10-CM

## 2021-04-13 DIAGNOSIS — E87.1 HYPONATREMIA: ICD-10-CM

## 2021-04-13 DIAGNOSIS — M48.061 SPINAL STENOSIS OF LUMBAR REGION, UNSPECIFIED WHETHER NEUROGENIC CLAUDICATION PRESENT: Chronic | ICD-10-CM

## 2021-04-13 DIAGNOSIS — L82.1 SEBORRHEIC KERATOSES: ICD-10-CM

## 2021-04-13 DIAGNOSIS — Z23 NEED FOR VACCINATION: ICD-10-CM

## 2021-04-13 DIAGNOSIS — Z11.59 NEED FOR HEPATITIS C SCREENING TEST: ICD-10-CM

## 2021-04-13 DIAGNOSIS — Z76.89 ENCOUNTER TO ESTABLISH CARE: ICD-10-CM

## 2021-04-13 DIAGNOSIS — D53.9 MACROCYTIC ANEMIA: ICD-10-CM

## 2021-04-13 DIAGNOSIS — R73.02 IGT (IMPAIRED GLUCOSE TOLERANCE): ICD-10-CM

## 2021-04-13 DIAGNOSIS — E03.9 HYPOTHYROIDISM, UNSPECIFIED TYPE: ICD-10-CM

## 2021-04-13 DIAGNOSIS — Z12.12 SCREENING FOR COLORECTAL CANCER: ICD-10-CM

## 2021-04-13 PROCEDURE — 90750 HZV VACC RECOMBINANT IM: CPT | Performed by: PHYSICIAN ASSISTANT

## 2021-04-13 PROCEDURE — 90471 IMMUNIZATION ADMIN: CPT | Performed by: PHYSICIAN ASSISTANT

## 2021-04-13 PROCEDURE — 99214 OFFICE O/P EST MOD 30 MIN: CPT | Mod: 25 | Performed by: PHYSICIAN ASSISTANT

## 2021-04-13 ASSESSMENT — FIBROSIS 4 INDEX: FIB4 SCORE: 1.31

## 2021-04-13 ASSESSMENT — PATIENT HEALTH QUESTIONNAIRE - PHQ9: CLINICAL INTERPRETATION OF PHQ2 SCORE: 0

## 2021-04-13 ASSESSMENT — PAIN SCALES - GENERAL: PAINLEVEL: NO PAIN

## 2021-04-13 NOTE — ASSESSMENT & PLAN NOTE
Chronic, with intermittent flares.  Patient requesting handicap parking placard.  Patient has pain with walking more than 200 feet.  Has gone to physical therapy in the past.

## 2021-04-13 NOTE — PATIENT INSTRUCTIONS
Once resulted, your lab/test/imaging results will show up automatically in your MyChart. Please wait for my interpretation and recommendations prior to viewing your results to avoid any unnecessary confusion or misinterpretation. I will address all of the lab values that I interpret as abnormal and message you accordingly on your MyChart. I will always send you a message on your labs even if they are normal. If you do not hear back from me within 5 business days after getting your labs drawn, then please send me a message on MyChart so I can obtain your labs (especially if you went to an outside lab - [facility], [facility], etc). If you have any additional questions or concerns beyond my interpretation of your results, please make an appointment with me to discuss in further detail.    Please only use the [employer] messaging system for questions regarding your most recent appointment or if advised to use otherwise (glucose or blood pressure reporting). If you have any new problems or concerns, you must make an appointment to discuss. This includes any referral requests.     Thank you,    Etta Joe PA-C ([place])  Physician Assistant Certified  [facility]

## 2021-04-13 NOTE — ASSESSMENT & PLAN NOTE
Dermatology is monitoring.  Patient states he has had them for several years.  They did not develop acutely.  No abdominal pain.

## 2021-04-13 NOTE — PROGRESS NOTES
Subjective:     CC:    Chief Complaint   Patient presents with   • Requesting Labs   • Establish Care   • Immunizations   • Paperwork     handicap parking space renewal     HISTORY OF THE PRESENT ILLNESS: Patient is a 75 y.o. male here to establish care.  Patient requesting his final Shingrix dose.    Lumbar spinal stenosis  Chronic, with intermittent flares.  Patient requesting handicap parking placard.  Patient has pain with walking more than 200 feet.  Has gone to physical therapy in the past.    Hypothyroidism  Chronic, on Synthroid 50 MCG daily.  Last labs around 9 months ago.    IGT (impaired glucose tolerance)  Last A1c 6.1    Seborrheic keratoses  Dermatology is monitoring.  Patient states he has had them for several years.  They did not develop acutely.  No abdominal pain.    Depression Screening    Little interest or pleasure in doing things?  0 - not at all   Feeling down, depressed , or hopeless? 0 - not at all     Allergies:   Patient has no known allergies.  Current Outpatient Medications Ordered in Epic   Medication Sig Dispense Refill   • VITAMIN D PO Take  by mouth.     • levothyroxine (SYNTHROID) 50 MCG Tab TAKE ONE TABLET BY MOUTH IN THE MORNING ON AN EMPTY STOMACH 90 Tab 3   • cyanocobalamin (VITAMIN B-12) 500 MCG Tab Take 500 mcg by mouth every day.     • SAW PALMETTO, SERENOA REPENS, PO Take  by mouth.       No current Epic-ordered facility-administered medications on file.     Past Medical History:   Diagnosis Date   • Cataract     alonso iol   • DDD (degenerative disc disease), cervical    • DDD (degenerative disc disease), cervical    • DDD (degenerative disc disease), lumbar    • Hiatus hernia syndrome    • Lumbar spinal stenosis    • Vitamin D deficiency 2/2/2018     Past Surgical History:   Procedure Laterality Date   • INGUINAL HERNIA REPAIR ROBOTIC Left 4/8/2016    Procedure: INGUINAL HERNIA REPAIR ROBOTIC WITH MESH;  Surgeon: Dustin Maria M.D.;  Location: SURGERY SAME DAY [facility];  " Service:    • OTHER  2006    alonso iol   • CATARACT EXTRACTION WITH IOL Bilateral    • HERNIA REPAIR      bilat     Social History     Tobacco Use   • Smoking status: Never Smoker   • Smokeless tobacco: Never Used   Substance Use Topics   • Alcohol use: Yes     Alcohol/week: 0.6 oz     Types: 1 Glasses of wine per week     Comment: 4-5/week; beer once in a while   • Drug use: No     Social History     Social History Narrative   • Not on file     Family History   Problem Relation Age of Onset   • Cancer Mother 50        breast   • Cancer Paternal Grandmother 70        cancer     Health Maintenance:  Diet: balanced, no restrictions  Exercise: daily, lifting, walking, biking, sauna     Cancer screening  Colorectal Cancer Screening: due  Prostate Cancer Screening/PSA: due 07/21    Infectious disease screening/Immunizations  --Hepatitis C Screening: due  --Immunizations:    Tetanus: due   Shingles: today   Pneumococcal : due     ROS:   Gen: no fevers/chills, no changes in weight  Eyes: no changes in vision  ENT: no sore throat, no hearing loss, no bloody nose  Pulm: no sob, no cough  CV: no chest pain, no palpitations  GI: no nausea/vomiting, no diarrhea  : no dysuria or hematuria  MSk: no myalgias  Skin: no rash  Neuro: no headaches, no numbness/tingling  Heme/Lymph: no easy bruising      Objective:     Exam: /56 (BP Location: Left arm, Patient Position: Sitting, BP Cuff Size: Adult)   Pulse 60   Temp 37.1 °C (98.8 °F) (Temporal)   Resp 12   Ht 1.778 m (5' 10\")   Wt 63 kg (139 lb)   SpO2 98%  Body mass index is 19.94 kg/m².  General: Normal appearing. No distress.  HEENT: Normocephalic. Eyes conjunctiva clear lids without ptosis, pupils equal   Neck: Supple without JVD or bruit. Thyroid is not enlarged.  Pulmonary: Clear to ausculation.  Normal effort. No rales, ronchi, or wheezing.  Cardiovascular: Regular rate and rhythm. 1/6 systolic murmur. Carotid and radial pulses are intact and equal " bilaterally.  Abdomen: Soft, nontender, nondistended. Normal bowel sounds. Liver and spleen are not palpable  Neurologic: Grossly nonfocal  Lymph: No cervical or supraclavicular lymph nodes are palpable  Skin: Warm and dry.  Multiple seborrheic keratoses, moles, cherry hemangiomas.  Musculoskeletal: Normal gait. No extremity cyanosis, clubbing, or edema.  Psych: Normal mood and affect. Alert and oriented x3. Judgment and insight is normal.    Assessment & Plan:   75 y.o. male with the following -    1. Encounter to establish care  Pleasant 75-year-old male here to establish care.  Lab orders reviewed, vaccinations discussed.  Due for colonoscopy.  Due for hepatitis C screening.  Will schedule annual wellness visit for the summer 2021.  PSA 2021.    2. Hypothyroidism, unspecified type  Chronic, on Synthroid.  Check updated labs.  - Comp Metabolic Panel; Future  - TSH WITH REFLEX TO FT4; Future  - FREE THYROXINE; Future    3. Macrocytic anemia  Noted on previous labs.  Work-up.  See orders below.  - CBC WITH DIFFERENTIAL; Future  - FOLATE; Future  - VITAMIN B12; Future  - TSH WITH REFLEX TO FT4; Future  - FREE THYROXINE; Future  - SPEP W/REFLEX TO MARJAN, A, G, M; Future    4. Hyponatremia  Chronic, medications reviewed.  Potentially could be related to thyroid disorder but if it persists will work-up for SIADH.  - Comp Metabolic Panel; Future    5. IGT (impaired glucose tolerance)  Chronic, repeat.  - HEMOGLOBIN A1C; Future    6. Need for hepatitis C screening test  - HCV Scrn ( [id number] [employer]); Future    7. Need for vaccination  - Shingrix Vaccine    8. Screening for colorectal cancer  - REFERRAL TO GI FOR COLONOSCOPY    9. Vitamin D deficiency  - VITAMIN D,25 HYDROXY; Future    10. Spinal stenosis of lumbar region, unspecified whether neurogenic claudication present  Handicap placard form filled out given to patient.  Scanned in chart.    Return in about 3 months (around 2021) for Annual wellness exam -  Medicare.    Etta Joe PA-C ([facility])  Physician Assistant Certified  [facility]    Please note that this dictation was created using voice recognition software. I have made every reasonable attempt to correct obvious errors, but I expect that there are errors of grammar and possibly content that I did not discover before finalizing the note.

## 2021-04-28 ENCOUNTER — HOSPITAL ENCOUNTER (OUTPATIENT)
Dept: LAB | Facility: MEDICAL CENTER | Age: 76
End: 2021-04-28
Attending: PHYSICIAN ASSISTANT
Payer: MEDICARE

## 2021-04-28 DIAGNOSIS — E55.9 VITAMIN D DEFICIENCY: ICD-10-CM

## 2021-04-28 DIAGNOSIS — R73.02 IGT (IMPAIRED GLUCOSE TOLERANCE): ICD-10-CM

## 2021-04-28 DIAGNOSIS — Z11.59 NEED FOR HEPATITIS C SCREENING TEST: ICD-10-CM

## 2021-04-28 DIAGNOSIS — D53.9 MACROCYTIC ANEMIA: ICD-10-CM

## 2021-04-28 DIAGNOSIS — E03.9 HYPOTHYROIDISM, UNSPECIFIED TYPE: ICD-10-CM

## 2021-04-28 DIAGNOSIS — E87.1 HYPONATREMIA: ICD-10-CM

## 2021-05-03 ENCOUNTER — HOSPITAL ENCOUNTER (OUTPATIENT)
Facility: MEDICAL CENTER | Age: 76
End: 2021-05-03
Attending: PHYSICIAN ASSISTANT

## 2021-05-03 ENCOUNTER — TELEPHONE (OUTPATIENT)
Dept: MEDICAL GROUP | Facility: IMAGING CENTER | Age: 76
End: 2021-05-03

## 2021-05-03 NOTE — TELEPHONE ENCOUNTER
"Called pt about canceled labs. Pt reports he went to the lab on [address] and had them drawn on .     Called [facility] at [facility], spoke to 3 different ppl (Hannah, Brielle, Garrick) who all confirmed labs were canceled and the note states \"pt will return to get labs drawn\". Garrick confirmed pt did get labs drawn but the labs had to be discontinued/canceled because the PARs could not verify pt's [facility] insurance electronically. Garrick states once pt gets an active insurance, pt can return to get the labs redrawn without needing a new order.    Called [facility] to confirm pt's insurance we have on file ([id number]). Representative at [facility] states pt's insurance  20.     Tried to call pt again to relay information and ask about new insurance but pt does not have a voicemail set up.   "

## 2021-05-11 NOTE — TELEPHONE ENCOUNTER
Called Chirag again. Informed him that his [employer] insurance is no longer active as of 9/30/2020. Told pt his provider would still like him to repeat the labs since the previous ones were discontinued due to insurance problems. Told pt lab orders are still in the system and he can get them drawn once he has active insurance again. Pt v/u.

## 2021-05-18 NOTE — TELEPHONE ENCOUNTER
"Called pt again and verified pt's insurance coverage. Pt confirms he does have medicare. Also states he has a colon appt in June. Reminded pt that Etta still wants him to get the previous labs redrawn that were discontinued due to his previous insurance back in April. Pt v/u and states \"I will get them done at some point.\"   "

## 2021-07-30 ENCOUNTER — HOSPITAL ENCOUNTER (EMERGENCY)
Facility: MEDICAL CENTER | Age: 76
End: 2021-07-30
Attending: EMERGENCY MEDICINE
Payer: MEDICARE

## 2021-07-30 VITALS
BODY MASS INDEX: 19.9 KG/M2 | TEMPERATURE: 96.8 F | RESPIRATION RATE: 20 BRPM | DIASTOLIC BLOOD PRESSURE: 85 MMHG | HEIGHT: 70 IN | WEIGHT: 139 LBS | OXYGEN SATURATION: 98 % | HEART RATE: 78 BPM | SYSTOLIC BLOOD PRESSURE: 166 MMHG

## 2021-07-30 DIAGNOSIS — S01.01XA LACERATION OF SCALP, INITIAL ENCOUNTER: ICD-10-CM

## 2021-07-30 DIAGNOSIS — S09.90XA TRAUMATIC INJURY OF HEAD, INITIAL ENCOUNTER: ICD-10-CM

## 2021-07-30 PROCEDURE — 99282 EMERGENCY DEPT VISIT SF MDM: CPT | Mod: 25

## 2021-07-30 PROCEDURE — 304217 HCHG IRRIGATION SYSTEM

## 2021-07-30 PROCEDURE — 90715 TDAP VACCINE 7 YRS/> IM: CPT | Performed by: EMERGENCY MEDICINE

## 2021-07-30 PROCEDURE — 700111 HCHG RX REV CODE 636 W/ 250 OVERRIDE (IP): Performed by: EMERGENCY MEDICINE

## 2021-07-30 PROCEDURE — 90471 IMMUNIZATION ADMIN: CPT

## 2021-07-30 PROCEDURE — 304999 HCHG REPAIR-SIMPLE/INTERMED LEVEL 1

## 2021-07-30 PROCEDURE — 305308 HCHG STAPLER,SKIN,DISP.

## 2021-07-30 RX ADMIN — CLOSTRIDIUM TETANI TOXOID ANTIGEN (FORMALDEHYDE INACTIVATED), CORYNEBACTERIUM DIPHTHERIAE TOXOID ANTIGEN (FORMALDEHYDE INACTIVATED), BORDETELLA PERTUSSIS TOXOID ANTIGEN (GLUTARALDEHYDE INACTIVATED), BORDETELLA PERTUSSIS FILAMENTOUS HEMAGGLUTININ ANTIGEN (FORMALDEHYDE INACTIVATED), BORDETELLA PERTUSSIS PERTACTIN ANTIGEN, AND BORDETELLA PERTUSSIS FIMBRIAE 2/3 ANTIGEN 0.5 ML: 5; 2; 2.5; 5; 3; 5 INJECTION, SUSPENSION INTRAMUSCULAR at 11:23

## 2021-07-30 ASSESSMENT — LIFESTYLE VARIABLES: DO YOU DRINK ALCOHOL: NO

## 2021-07-30 ASSESSMENT — FIBROSIS 4 INDEX: FIB4 SCORE: 1.31
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
## 2021-07-30 NOTE — DISCHARGE INSTRUCTIONS
If you develop severe headache, nausea vomiting, difficulty walking, dizziness, changes in your vision, weakness or numbness or have any other concerns please return to the emergency department.  If the wound becomes red, starts to swell or have any abnormal drainage please return to the emergency department.  Scalp wounds from your mechanism typically do not get infected but if you develop a fever please return to the emergency department.

## 2021-08-06 ENCOUNTER — HOSPITAL ENCOUNTER (EMERGENCY)
Facility: MEDICAL CENTER | Age: 76
End: 2021-08-06
Attending: EMERGENCY MEDICINE | Admitting: EMERGENCY MEDICINE
Payer: MEDICARE

## 2021-08-06 VITALS
HEART RATE: 70 BPM | SYSTOLIC BLOOD PRESSURE: 153 MMHG | BODY MASS INDEX: 19.47 KG/M2 | WEIGHT: 136.02 LBS | OXYGEN SATURATION: 97 % | TEMPERATURE: 96.9 F | RESPIRATION RATE: 16 BRPM | DIASTOLIC BLOOD PRESSURE: 77 MMHG | HEIGHT: 70 IN

## 2021-08-06 DIAGNOSIS — Z48.02 ENCOUNTER FOR STAPLE REMOVAL: ICD-10-CM

## 2021-08-06 PROCEDURE — 99281 EMR DPT VST MAYX REQ PHY/QHP: CPT

## 2021-08-06 ASSESSMENT — FIBROSIS 4 INDEX: FIB4 SCORE: 1.31

## 2021-08-06 NOTE — ED TRIAGE NOTES
"Chief Complaint   Patient presents with   • Staple Removal     Pt is here for a staple removal. Pt has staples on the top of his head. Pt got these placed last Friday. Pt denies any drainage/redness.     /74   Pulse 70   Temp 36.1 °C (96.9 °F) (Temporal)   Resp 16   Ht 1.778 m (5' 10\")   Wt 61.7 kg (136 lb 0.4 oz)   SpO2 97%   BMI 19.52 kg/m²     Patient arrived to ED for the above complaint. Triage process explained to patient. Patient placed in lobby and told to notify staff of any changes.   "

## 2021-08-06 NOTE — ED NOTES
Pt ambulated from triage to room without difficulty. Pt sitting up in chair, chart up for ERP. Pt oriented and stable.

## 2021-08-06 NOTE — ED PROVIDER NOTES
"ED Provider Note    CHIEF COMPLAINT  Chief Complaint   Patient presents with   • Staple Removal     Pt is here for a staple removal. Pt has staples on the top of his head. Pt got these placed last Friday. Pt denies any drainage/redness.       HPI  Chirag Damon Jr. is a 75 y.o. male who presents for staple removal.  7 days ago he sustained a laceration to his head that was repaired here in the emergency department with staples.  He states that the wound has seemingly been healing well, no drainage, no redness, no fever.  No other complaints.    REVIEW OF SYSTEMS  Negative for fever    PAST MEDICAL HISTORY   has a past medical history of Cataract, DDD (degenerative disc disease), cervical, DDD (degenerative disc disease), cervical, DDD (degenerative disc disease), lumbar, Hiatus hernia syndrome, Lumbar spinal stenosis, and Vitamin D deficiency (2/2/2018).    SOCIAL HISTORY  Social History     Tobacco Use   • Smoking status: Never Smoker   • Smokeless tobacco: Never Used   Vaping Use   • Vaping Use: Never used   Substance and Sexual Activity   • Alcohol use: Yes     Alcohol/week: 0.6 oz     Types: 1 Glasses of wine per week     Comment: 4-5/week; beer once in a while   • Drug use: No   • Sexual activity: Yes     Comment: , 2 sons,        SURGICAL HISTORY   has a past surgical history that includes other (2006); inguinal hernia repair robotic (Left, 4/8/2016); hernia repair; and cataract extraction with iol (Bilateral).    CURRENT MEDICATIONS  I personally reviewed the medication list in the charting documentation.     ALLERGIES  No Known Allergies    PHYSICAL EXAM  VITAL SIGNS: /74   Pulse 70   Temp 36.1 °C (96.9 °F) (Temporal)   Resp 16   Ht 1.778 m (5' 10\")   Wt 61.7 kg (136 lb 0.4 oz)   SpO2 97%   BMI 19.52 kg/m²   Constitutional: Well appearing patient in no acute distress.  Awake and alert, not toxic nor ill in appearance.  HENT: Normocephalic, no obvious evidence of acute trauma.  " Inspection of the scalp reveals a laceration with 6 in place staples.  The wound margins are well approximated, there is no surrounding erythema.  Good healing.  6 staples removed.  Neck: Comfortable movement without any obvious restriction in the range of motion.  Eyes: Conjunctiva normal, Non-icteric.   Chest: Normal nonlabored respirations.  Skin: The exposed portions of skin reveal no obvious rash or other abnormalities.  Musculoskeletal: No obvious restriction in the range of motion in all major joints.   Neurologic: Alert, No obvious focal deficits noted.   Psychiatric: Affect normal for clinical presentation    COURSE & MEDICAL DECISION MAKING  Pertinent Labs & Imaging studies reviewed. (See chart for details)    Encounter Summary: This is a very pleasant 75 y.o. male who unfortunately required evaluation in the emergency department today for removal of staples.  Placed 1 week ago.  Good wound healing.  Removed without complication by myself.  Discharged home.      DISPOSITION: Discharge Home      FINAL IMPRESSION  1. Encounter for staple removal        This dictation was created using voice recognition software. The accuracy of the dictation is limited to the abilities of the software. I expect there may be some errors of grammar and possibly content. The nursing notes were reviewed and certain aspects of this information were incorporated into this note.    Electronically signed by: Ranjit Fenton M.D., 8/6/2021 8:28 AM

## 2021-08-26 ENCOUNTER — TELEPHONE (OUTPATIENT)
Dept: MEDICAL GROUP | Facility: IMAGING CENTER | Age: 76
End: 2021-08-26

## 2021-08-26 NOTE — TELEPHONE ENCOUNTER
Received message from patient requesting refill on medications. Patient did not leave which medications he needs.    Called and lm for patient to return call.

## 2021-08-30 DIAGNOSIS — E03.9 HYPOTHYROIDISM, UNSPECIFIED TYPE: ICD-10-CM

## 2021-08-30 RX ORDER — LEVOTHYROXINE SODIUM 0.05 MG/1
50 TABLET ORAL
COMMUNITY
End: 2021-08-30 | Stop reason: SDUPTHER

## 2021-08-30 RX ORDER — LEVOTHYROXINE SODIUM 0.05 MG/1
50 TABLET ORAL
Qty: 30 TABLET | Refills: 1 | Status: SHIPPED | OUTPATIENT
Start: 2021-08-30 | End: 2021-10-14 | Stop reason: SDUPTHER

## 2021-08-30 NOTE — TELEPHONE ENCOUNTER
Patient came to the office today requesting a refill of his Synthroid 50 MCG daily.  He does not have a follow-up appointment yet.  He did have a normal TSH and free T4 in April 2021.    Etta Joe PA-C ([facility]aker)  Physician Assistant Certified  [facility]

## 2021-10-14 ENCOUNTER — OFFICE VISIT (OUTPATIENT)
Dept: MEDICAL GROUP | Facility: IMAGING CENTER | Age: 76
End: 2021-10-14
Payer: MEDICARE

## 2021-10-14 VITALS
BODY MASS INDEX: 19.9 KG/M2 | TEMPERATURE: 98.7 F | DIASTOLIC BLOOD PRESSURE: 64 MMHG | HEIGHT: 70 IN | SYSTOLIC BLOOD PRESSURE: 110 MMHG | HEART RATE: 71 BPM | OXYGEN SATURATION: 98 % | WEIGHT: 139 LBS

## 2021-10-14 DIAGNOSIS — D53.9 MACROCYTIC ANEMIA: ICD-10-CM

## 2021-10-14 DIAGNOSIS — R73.02 IGT (IMPAIRED GLUCOSE TOLERANCE): ICD-10-CM

## 2021-10-14 DIAGNOSIS — Z86.39 HISTORY OF VITAMIN D DEFICIENCY: ICD-10-CM

## 2021-10-14 DIAGNOSIS — E87.1 HYPONATREMIA: ICD-10-CM

## 2021-10-14 DIAGNOSIS — Z11.59 NEED FOR HEPATITIS C SCREENING TEST: ICD-10-CM

## 2021-10-14 DIAGNOSIS — E03.9 HYPOTHYROIDISM, UNSPECIFIED TYPE: ICD-10-CM

## 2021-10-14 DIAGNOSIS — Z00.00 MEDICARE ANNUAL WELLNESS VISIT, SUBSEQUENT: ICD-10-CM

## 2021-10-14 DIAGNOSIS — Z23 NEED FOR VACCINATION: ICD-10-CM

## 2021-10-14 DIAGNOSIS — Z12.5 PROSTATE CANCER SCREENING: ICD-10-CM

## 2021-10-14 PROCEDURE — 90662 IIV NO PRSV INCREASED AG IM: CPT | Performed by: PHYSICIAN ASSISTANT

## 2021-10-14 PROCEDURE — G0439 PPPS, SUBSEQ VISIT: HCPCS | Performed by: PHYSICIAN ASSISTANT

## 2021-10-14 PROCEDURE — G0008 ADMIN INFLUENZA VIRUS VAC: HCPCS | Performed by: PHYSICIAN ASSISTANT

## 2021-10-14 RX ORDER — LEVOTHYROXINE SODIUM 0.05 MG/1
50 TABLET ORAL
Qty: 90 TABLET | Refills: 1 | Status: SHIPPED | OUTPATIENT
Start: 2021-10-14 | End: 2022-02-08 | Stop reason: SDUPTHER

## 2021-10-14 ASSESSMENT — ACTIVITIES OF DAILY LIVING (ADL): BATHING_REQUIRES_ASSISTANCE: 0

## 2021-10-14 ASSESSMENT — FIBROSIS 4 INDEX: FIB4 SCORE: 1.31

## 2021-10-14 ASSESSMENT — PAIN SCALES - GENERAL: PAINLEVEL: NO PAIN

## 2021-10-14 ASSESSMENT — PATIENT HEALTH QUESTIONNAIRE - PHQ9: CLINICAL INTERPRETATION OF PHQ2 SCORE: 0

## 2021-10-14 ASSESSMENT — ENCOUNTER SYMPTOMS: GENERAL WELL-BEING: GOOD

## 2021-10-14 NOTE — PATIENT INSTRUCTIONS
Once resulted, your lab/test/imaging results will show up automatically in your MyChart. Please wait for my interpretation and recommendations prior to viewing your results to avoid any unnecessary confusion or misinterpretation. I will address all of the lab values that I interpret as abnormal and message you accordingly on your MyChart. I will always send you a message on your labs even if they are normal. If you do not hear back from me within 5 business days after getting your labs drawn, then please send me a message on MyChart so I can obtain your labs (especially if you went to an outside lab - [facility], [facility], etc). If you have any additional questions or concerns beyond my interpretation of your results, please make an appointment with me to discuss in further detail.    Please only use the [employer] messaging system for questions regarding your most recent appointment or if advised to use otherwise (glucose or blood pressure reporting). If you have any new problems or concerns, you must make an appointment to discuss. This includes any referral requests.     Thank you,    Etta Joe PA-C ([place])  Physician Assistant Certified  [facility]

## 2021-10-14 NOTE — PROGRESS NOTES
Chief Complaint   Patient presents with   • Follow-Up   • Medication Management     HPI:  Chirag is a 75 y.o. here for Medicare Annual Wellness Visit    Hyponatremia  Chronic, patient not on any diuretics.  Patient states he drinks between 60 to 80 ounces of water a day.  He does a sauna 3 days a week.  No weight loss, night sweats, shortness of breath, cough.    Hypothyroidism  Chronic, on Synthroid 50 MCG daily.    IGT (impaired glucose tolerance)  Last A1c 6.1.    Macrocytic anemia  Noted in previous labs.  History of hypothyroidism.  Await endoscopy.        Patient Active Problem List    Diagnosis Date Noted   • Macrocytic anemia 04/13/2021   • Hyponatremia 04/13/2021   • Seborrheic keratoses 04/13/2021   • DDD (degenerative disc disease), lumbar 05/09/2017   • Lumbar spinal stenosis 05/09/2017   • Hypothyroidism 05/09/2017   • IGT (impaired glucose tolerance) 05/09/2017   • DDD (degenerative disc disease), cervical 10/16/2016   • Hernia, inguinal, left 04/08/2016       Current Outpatient Medications   Medication Sig Dispense Refill   • levothyroxine (SYNTHROID) 50 MCG Tab Take 1 Tablet by mouth every morning on an empty stomach. 90 Tablet 1     No current facility-administered medications for this visit.        Patient is taking medications as noted in medication list.  Current supplements as per medication list.     Allergies: Patient has no known allergies.    Current social contact/activities: Hanging out with family, going to movies, emailing friends.     Is patient current with immunizations? Yes.    He  reports that he has never smoked. He has never used smokeless tobacco. He reports current alcohol use of about 0.6 oz of alcohol per week. He reports that he does not use drugs.  Counseling given: Not Answered        DPA/Advanced directive: Patient does not have an Advanced Directive.  A packet and workshop information was given on Advanced Directives.    ROS:    Gait: Uses no assistive device   Ostomy: No    Other tubes: No   Amputations: No   Chronic oxygen use No   Last eye exam  5 years ago per patient  Wears hearing aids: No   : Denies any urinary leakage during the last 6 months      Screening: Patient plans on scheduling his colonoscopy.      Depression Screening    Little interest or pleasure in doing things?  0 - not at all  Feeling down, depressed, or hopeless? 0 - not at all  Patient Health Questionnaire Score: 0    If depressive symptoms identified deferred to follow up visit unless specifically addressed in assessment and plan.    Interpretation of PHQ-9 Total Score   Score Severity   1-4 No Depression   5-9 Mild Depression   10-14 Moderate Depression   15-19 Moderately Severe Depression   20-27 Severe Depression    Screening for Cognitive Impairment    Three Minute Recall (captain, garden, picture)  2/3    Aguilar clock face with all 12 numbers and set the hands to show 5 past 8.  Yes    If cognitive concerns identified, deferred for follow up unless specifically addressed in assessment and plan.    Fall Risk Assessment    Has the patient had two or more falls in the last year or any fall with injury in the last year?  No  If fall risk identified, deferred for follow up unless specifically addressed in assessment and plan.    Safety Assessment    Throw rugs on floor.  No  Handrails on all stairs.  Yes  Good lighting in all hallways.  Yes  Difficulty hearing.  No  Patient counseled about all safety risks that were identified.    Functional Assessment ADLs    Are there any barriers preventing you from cooking for yourself or meeting nutritional needs?  No.    Are there any barriers preventing you from driving safely or obtaining transportation?  No.    Are there any barriers preventing you from using a telephone or calling for help?  No.    Are there any barriers preventing you from shopping?  No.    Are there any barriers preventing you from taking care of your own finances?  No.    Are there any barriers  preventing you from managing your medications?  No.    Are there any barriers preventing you from showering, bathing or dressing yourself?  No.    Are you currently engaging in any exercise or physical activity?  Yes.  Very active  What is your perception of your health?  Good.    Health Maintenance Summary                Annual Wellness Visit Overdue 1945     HEPATITIS C SCREENING Overdue 1945     IMM PNEUMOCOCCAL VACCINE: 65+ Years Overdue 8/16/2017      Done 8/16/2016 Imm Admin: Pneumococcal Conjugate Vaccine (Prevnar/PCV-13)    COLORECTAL CANCER SCREENING Next Due 10/27/2024     IMM DTaP/Tdap/Td Vaccine Next Due 7/30/2031      Done 7/30/2021 Imm Admin: Tdap Vaccine          Patient Care Team:  Etta Joe P.A.-C. as PCP - General (Physician Assistant)  Jessica Miller R.N. as Registered Nurse    Social History     Tobacco Use   • Smoking status: Never Smoker   • Smokeless tobacco: Never Used   Vaping Use   • Vaping Use: Never used   Substance Use Topics   • Alcohol use: Yes     Alcohol/week: 0.6 oz     Types: 1 Glasses of wine per week     Comment: 4-5/week; beer once in a while   • Drug use: No     Family History   Problem Relation Age of Onset   • Cancer Mother 50        breast   • Cancer Paternal Grandmother 70        cancer     He  has a past medical history of Cataract, DDD (degenerative disc disease), cervical, DDD (degenerative disc disease), cervical, DDD (degenerative disc disease), lumbar, Hiatus hernia syndrome, Lumbar spinal stenosis, and Vitamin D deficiency (2/2/2018).   Past Surgical History:   Procedure Laterality Date   • INGUINAL HERNIA REPAIR ROBOTIC Left 4/8/2016    Procedure: INGUINAL HERNIA REPAIR ROBOTIC WITH MESH;  Surgeon: Dustin Maria M.D.;  Location: SURGERY SAME DAY [facility];  Service:    • OTHER  2006    alonso iol   • CATARACT EXTRACTION WITH IOL Bilateral    • HERNIA REPAIR      bilat     Exam:     /64 (BP Location: Left arm, Patient Position: Sitting,  "BP Cuff Size: Adult)   Pulse 71   Temp 37.1 °C (98.7 °F) (Temporal)   Ht 1.778 m (5' 10\")   Wt 63 kg (139 lb)   SpO2 98%  Body mass index is 19.94 kg/m².    Hearing excellent.    Dentition good  Alert, oriented in no acute distress.  Eye contact is good, speech goal directed, affect calm      Assessment and Plan. The following treatment and monitoring plan is recommended:      1. Wellness examination     2. Hypothyroidism, unspecified type  Chronic, on Synthroid 50 MCG daily.  Refill today.  - levothyroxine (SYNTHROID) 50 MCG Tab; Take 1 Tablet by mouth every morning on an empty stomach.  Dispense: 90 Tablet; Refill: 1  - TSH; Future  - FREE THYROXINE; Future    3. Need for hepatitis C screening test  - HCV Scrn ( [id number] [employer]); Future    4. IGT (impaired glucose tolerance)  - HEMOGLOBIN A1C; Future  - Comp Metabolic Panel; Future    5. Macrocytic anemia  - CBC WITH DIFFERENTIAL; Future  - FOLATE; Future  - VITAMIN B12; Future  - SPEP W/REFLEX TO MARJAN, A, G, M; Future    6. Hyponatremia  - TSH; Future  - FREE THYROXINE; Future  - OSMOLALITY SERUM; Future  - Comp Metabolic Panel; Future    7. History of vitamin D deficiency  - VITAMIN D,25 HYDROXY; Future    8. Prostate cancer screening  - PROSTATE SPECIFIC AG SCREENING; Future    9. Need for vaccination  - Influenza Vaccine, High Dose (65+ Only)    Services suggested: No services needed at this time  Health Care Screening recommendations as per orders if indicated.  Referrals offered: PT/OT/Nutrition counseling/Behavioral Health/Smoking cessation as per orders if indicated.    Discussion today about general wellness and lifestyle habits:    · Prevent falls and reduce trip hazards; Cautioned about securing or removing rugs.  · Have a working fire alarm and carbon monoxide detector;   · Engage in regular physical activity and social activities       Follow-up: Return for Will notify patient to follow-up pending tests.  "

## 2021-10-14 NOTE — ASSESSMENT & PLAN NOTE
Chronic, patient not on any diuretics.  Patient states he drinks between 60 to 80 ounces of water a day.  He does a sauna 3 days a week.  No weight loss, night sweats, shortness of breath, cough.

## 2021-10-15 ENCOUNTER — PATIENT MESSAGE (OUTPATIENT)
Dept: HEALTH INFORMATION MANAGEMENT | Facility: OTHER | Age: 76
End: 2021-10-15

## 2021-10-28 ENCOUNTER — TELEPHONE (OUTPATIENT)
Dept: HEALTH INFORMATION MANAGEMENT | Facility: OTHER | Age: 76
End: 2021-10-28

## 2021-11-12 ENCOUNTER — HOSPITAL ENCOUNTER (OUTPATIENT)
Dept: LAB | Facility: MEDICAL CENTER | Age: 76
End: 2021-11-12
Attending: PHYSICIAN ASSISTANT
Payer: MEDICARE

## 2021-11-12 DIAGNOSIS — Z86.39 HISTORY OF VITAMIN D DEFICIENCY: ICD-10-CM

## 2021-11-12 DIAGNOSIS — E87.1 HYPONATREMIA: ICD-10-CM

## 2021-11-12 DIAGNOSIS — Z12.5 PROSTATE CANCER SCREENING: ICD-10-CM

## 2021-11-12 DIAGNOSIS — R73.02 IGT (IMPAIRED GLUCOSE TOLERANCE): ICD-10-CM

## 2021-11-12 DIAGNOSIS — Z11.59 NEED FOR HEPATITIS C SCREENING TEST: ICD-10-CM

## 2021-11-12 DIAGNOSIS — D53.9 MACROCYTIC ANEMIA: ICD-10-CM

## 2021-11-12 DIAGNOSIS — E03.9 HYPOTHYROIDISM, UNSPECIFIED TYPE: ICD-10-CM

## 2021-11-12 LAB
ALBUMIN SERPL BCP-MCNC: 4.7 G/DL (ref 3.2–4.9)
ALBUMIN/GLOB SERPL: 1.5 G/DL
ALP SERPL-CCNC: 60 U/L (ref 30–99)
ALT SERPL-CCNC: 16 U/L (ref 2–50)
ANION GAP SERPL CALC-SCNC: 9 MMOL/L (ref 7–16)
AST SERPL-CCNC: 17 U/L (ref 12–45)
BASOPHILS # BLD AUTO: 0.4 % (ref 0–1.8)
BASOPHILS # BLD: 0.03 K/UL (ref 0–0.12)
BILIRUB SERPL-MCNC: 0.4 MG/DL (ref 0.1–1.5)
BUN SERPL-MCNC: 9 MG/DL (ref 8–22)
CALCIUM SERPL-MCNC: 9.6 MG/DL (ref 8.5–10.5)
CHLORIDE SERPL-SCNC: 101 MMOL/L (ref 96–112)
CO2 SERPL-SCNC: 26 MMOL/L (ref 20–33)
CREAT SERPL-MCNC: 0.86 MG/DL (ref 0.5–1.4)
EOSINOPHIL # BLD AUTO: 0.05 K/UL (ref 0–0.51)
EOSINOPHIL NFR BLD: 0.7 % (ref 0–6.9)
ERYTHROCYTE [DISTWIDTH] IN BLOOD BY AUTOMATED COUNT: 46.5 FL (ref 35.9–50)
EST. AVERAGE GLUCOSE BLD GHB EST-MCNC: 117 MG/DL
FASTING STATUS PATIENT QL REPORTED: NORMAL
FOLATE SERPL-MCNC: 8.3 NG/ML
GLOBULIN SER CALC-MCNC: 3.1 G/DL (ref 1.9–3.5)
GLUCOSE SERPL-MCNC: 92 MG/DL (ref 65–99)
HBA1C MFR BLD: 5.7 % (ref 4–5.6)
HCT VFR BLD AUTO: 40.1 % (ref 42–52)
HCV AB SER QL: NORMAL
HGB BLD-MCNC: 14.1 G/DL (ref 14–18)
IMM GRANULOCYTES # BLD AUTO: 0.03 K/UL (ref 0–0.11)
IMM GRANULOCYTES NFR BLD AUTO: 0.4 % (ref 0–0.9)
LYMPHOCYTES # BLD AUTO: 1.61 K/UL (ref 1–4.8)
LYMPHOCYTES NFR BLD: 23.6 % (ref 22–41)
MCH RBC QN AUTO: 34.4 PG (ref 27–33)
MCHC RBC AUTO-ENTMCNC: 35.2 G/DL (ref 33.7–35.3)
MCV RBC AUTO: 97.8 FL (ref 81.4–97.8)
MONOCYTES # BLD AUTO: 0.63 K/UL (ref 0–0.85)
MONOCYTES NFR BLD AUTO: 9.2 % (ref 0–13.4)
NEUTROPHILS # BLD AUTO: 4.48 K/UL (ref 1.82–7.42)
NEUTROPHILS NFR BLD: 65.7 % (ref 44–72)
NRBC # BLD AUTO: 0 K/UL
NRBC BLD-RTO: 0 /100 WBC
OSMOLALITY SERPL: 281 MOSM/KG H2O (ref 278–298)
PLATELET # BLD AUTO: 265 K/UL (ref 164–446)
PMV BLD AUTO: 8.4 FL (ref 9–12.9)
POTASSIUM SERPL-SCNC: 4.3 MMOL/L (ref 3.6–5.5)
PROT SERPL-MCNC: 7.8 G/DL (ref 6–8.2)
PSA SERPL-MCNC: 0.3 NG/ML (ref 0–4)
RBC # BLD AUTO: 4.1 M/UL (ref 4.7–6.1)
SODIUM SERPL-SCNC: 136 MMOL/L (ref 135–145)
T4 FREE SERPL-MCNC: 1.44 NG/DL (ref 0.93–1.7)
TSH SERPL DL<=0.005 MIU/L-ACNC: 2.98 UIU/ML (ref 0.38–5.33)
VIT B12 SERPL-MCNC: 1695 PG/ML (ref 211–911)
WBC # BLD AUTO: 6.8 K/UL (ref 4.8–10.8)

## 2021-11-12 PROCEDURE — 36415 COLL VENOUS BLD VENIPUNCTURE: CPT

## 2021-11-12 PROCEDURE — 84165 PROTEIN E-PHORESIS SERUM: CPT

## 2021-11-12 PROCEDURE — 83930 ASSAY OF BLOOD OSMOLALITY: CPT

## 2021-11-12 PROCEDURE — 84155 ASSAY OF PROTEIN SERUM: CPT | Mod: XU

## 2021-11-12 PROCEDURE — 82306 VITAMIN D 25 HYDROXY: CPT

## 2021-11-12 PROCEDURE — 82607 VITAMIN B-12: CPT

## 2021-11-12 PROCEDURE — 84443 ASSAY THYROID STIM HORMONE: CPT

## 2021-11-12 PROCEDURE — G0472 HEP C SCREEN HIGH RISK/OTHER: HCPCS

## 2021-11-12 PROCEDURE — 80053 COMPREHEN METABOLIC PANEL: CPT

## 2021-11-12 PROCEDURE — 84153 ASSAY OF PSA TOTAL: CPT

## 2021-11-12 PROCEDURE — 82746 ASSAY OF FOLIC ACID SERUM: CPT

## 2021-11-12 PROCEDURE — 84439 ASSAY OF FREE THYROXINE: CPT

## 2021-11-12 PROCEDURE — 85025 COMPLETE CBC W/AUTO DIFF WBC: CPT

## 2021-11-12 PROCEDURE — 82784 ASSAY IGA/IGD/IGG/IGM EACH: CPT

## 2021-11-12 PROCEDURE — 86334 IMMUNOFIX E-PHORESIS SERUM: CPT

## 2021-11-12 PROCEDURE — 83036 HEMOGLOBIN GLYCOSYLATED A1C: CPT

## 2021-11-15 LAB — 25(OH)D3 SERPL-MCNC: 78 NG/ML (ref 30–80)

## 2021-11-17 LAB
IGA SERPL-MCNC: 70 MG/DL (ref 68–408)
IGG SERPL-MCNC: 745 MG/DL (ref 768–1632)
IGM SERPL-MCNC: 980 MG/DL (ref 35–263)

## 2021-11-18 LAB
ALBUMIN SERPL ELPH-MCNC: 4.33 G/DL (ref 3.75–5.01)
ALPHA1 GLOB SERPL ELPH-MCNC: 0.27 G/DL (ref 0.19–0.46)
ALPHA2 GLOB SERPL ELPH-MCNC: 0.8 G/DL (ref 0.48–1.05)
B-GLOBULIN SERPL ELPH-MCNC: 0.85 G/DL (ref 0.48–1.1)
GAMMA GLOB SERPL ELPH-MCNC: 1.06 G/DL (ref 0.62–1.51)
INTERPRETATION SERPL IFE-IMP: NORMAL
MONOCLON BAND OBS SERPL: NORMAL
PATHOLOGY STUDY: NORMAL
PROT SERPL-MCNC: 7.3 G/DL (ref 6.3–8.2)

## 2021-11-29 ENCOUNTER — OFFICE VISIT (OUTPATIENT)
Dept: MEDICAL GROUP | Facility: IMAGING CENTER | Age: 76
End: 2021-11-29
Payer: MEDICARE

## 2021-11-29 VITALS
OXYGEN SATURATION: 98 % | SYSTOLIC BLOOD PRESSURE: 120 MMHG | HEIGHT: 70 IN | WEIGHT: 137.8 LBS | HEART RATE: 71 BPM | BODY MASS INDEX: 19.73 KG/M2 | DIASTOLIC BLOOD PRESSURE: 62 MMHG | TEMPERATURE: 97.5 F | RESPIRATION RATE: 14 BRPM

## 2021-11-29 DIAGNOSIS — D47.2 MONOCLONAL GAMMOPATHY: ICD-10-CM

## 2021-11-29 DIAGNOSIS — R73.02 IGT (IMPAIRED GLUCOSE TOLERANCE): ICD-10-CM

## 2021-11-29 DIAGNOSIS — D75.89 MACROCYTOSIS: ICD-10-CM

## 2021-11-29 PROBLEM — E87.1 HYPONATREMIA: Status: RESOLVED | Noted: 2021-04-13 | Resolved: 2021-11-29

## 2021-11-29 PROCEDURE — 99214 OFFICE O/P EST MOD 30 MIN: CPT | Performed by: PHYSICIAN ASSISTANT

## 2021-11-29 ASSESSMENT — PAIN SCALES - GENERAL: PAINLEVEL: NO PAIN

## 2021-11-29 ASSESSMENT — FIBROSIS 4 INDEX: FIB4 SCORE: 1.202830188679245283

## 2021-11-29 NOTE — PATIENT INSTRUCTIONS
Monoclonal Gammopathy of Undetermined Significance (MGUS)  Monoclonal gammopathy of undetermined significance (MGUS) is a condition in which there is too much of a protein called monoclonal protein, or M protein, in the blood. MGUS can cause you to have too many cells in your blood and not enough space for healthy cells. This condition may increase your risk of developing multiple myeloma or other blood disorders in the future.  What are the causes?  The cause of this condition is not known.  What increases the risk?  You are more likely to develop this condition if:  · You are .  · You are age 50 or older.  · You are male.  · You have an autoimmune disease.  · You have been exposed to radiation.  · You have a family history of MGUS.  What are the signs or symptoms?  There are no symptoms of this condition.  How is this diagnosed?    This condition may be diagnosed with a blood test that checks for M protein.  How is this treated?  Treatment for this condition may involve:  · Having regular exams. This will allow your health care provider to monitor your health.  · Having tests done regularly, such as:  ? Blood tests to check for M protein in your body.  ? Imaging tests, such as a CT scan.  ? A bone marrow biopsy. This test involves taking a sample of bone marrow from your body so it can be looked at under a microscope.  Follow these instructions at home:  · Keep all follow-up visits as told by your health care provider. This is important.  Contact a health care provider if:  · You have trouble swallowing.  · You have pain in your back or ribs.  · You have a fever.  · You are bruising easily.  Get help right away if:  · You break a bone.  · You have trouble breathing.  Summary  · Monoclonal gammopathy of undetermined significance (MGUS) is a condition in which there is too much of a protein called monoclonal protein, or M protein, in the blood.  · This condition may be diagnosed with a blood test  that checks for M protein.  · Treatment for this condition may involve having tests done regularly. Tests may include blood tests, imaging tests, and a bone marrow biopsy.  This information is not intended to replace advice given to you by your health care provider. Make sure you discuss any questions you have with your health care provider.  Document Released: 11/08/2017 Document Revised: 02/10/2020 Document Reviewed: 11/08/2017  Elsevier Patient Education © 2020 [employer]

## 2021-11-29 NOTE — LETTER
2021        To Whom It May Concern:      Chirag Damon Jr. ( 47) is currently under my care and due to his ophthalmologic condition I would recommend tinted windows on his car to reduce any possible exacerbations or discomfort.        Etta Carter) Heath SADLER  Physician Assistant Certified  [facility]                                  Etta Joe P.A.-C.

## 2021-11-29 NOTE — ASSESSMENT & PLAN NOTE
B12, folate, thyroid function within normal limits.  No signs of liver disease.  SPEP showing a monoclonal gammopathy.

## 2021-11-29 NOTE — ASSESSMENT & PLAN NOTE
Upon review of previous providers labs patient's labs showed a mild macrocytosis thus an SPEP was ordered showing M-spike in the beta/gamma region.  The monoclonal protein peak accounts for 0.86 g/dL of the total protein in the beta and gamma regions. This quantitation may include complement components.  MARJAN pattern shows an IgM type kappa monoclonal protein.  Decreased IgG level.  He denies any bone pain, fatigue, weight loss.  No hypercalcemia or anemia.

## 2021-11-29 NOTE — ASSESSMENT & PLAN NOTE
Ref. Range 7/1/2020 08:20 11/12/2021 10:45   Glycohemoglobin Latest Ref Range: 4.0 - 5.6 % 6.1 (H) 5.7 (H)

## 2021-11-29 NOTE — PROGRESS NOTES
Subjective:     CC:   Chief Complaint   Patient presents with   • Hypothyroidism   • Lab Results       HPI:   Chirag presents today to discuss:    IGT (impaired glucose tolerance)     Ref. Range 7/1/2020 08:20 11/12/2021 10:45   Glycohemoglobin Latest Ref Range: 4.0 - 5.6 % 6.1 (H) 5.7 (H)       Monoclonal gammopathy  Upon review of previous providers labs patient's labs showed a mild macrocytosis thus an SPEP was ordered showing M-spike in the beta/gamma region.  The monoclonal protein peak accounts for 0.86 g/dL of the total protein in the beta and gamma regions. This quantitation may include complement components.  MARJAN pattern shows an IgM type kappa monoclonal protein.  Decreased IgG level.  He denies any bone pain, fatigue, weight loss.  No hypercalcemia or anemia.    Macrocytosis  B12, folate, thyroid function within normal limits.  No signs of liver disease.  SPEP showing a monoclonal gammopathy.      Past Medical History:   Diagnosis Date   • Cataract     alonso iol   • DDD (degenerative disc disease), cervical    • DDD (degenerative disc disease), cervical    • DDD (degenerative disc disease), lumbar    • Hiatus hernia syndrome    • Lumbar spinal stenosis    • Vitamin D deficiency 2/2/2018     Social History     Tobacco Use   • Smoking status: Never Smoker   • Smokeless tobacco: Never Used   Vaping Use   • Vaping Use: Never used   Substance Use Topics   • Alcohol use: Yes     Alcohol/week: 0.6 oz     Types: 1 Glasses of wine per week     Comment: 4-5/week; beer once in a while   • Drug use: No     Current Outpatient Medications Ordered in Epic   Medication Sig Dispense Refill   • levothyroxine (SYNTHROID) 50 MCG Tab Take 1 Tablet by mouth every morning on an empty stomach. 90 Tablet 1     No current Epic-ordered facility-administered medications on file.     Patient has no known allergies.    ROS: see hpi  Gen: no fevers/chills  Pulm: no sob, no cough  CV: no chest pain, no palpitations, no edema  GI: no  "nausea/vomiting, no diarrhea  Skin: no rash    Objective:   Exam:  /62 (BP Location: Left arm, Patient Position: Sitting, BP Cuff Size: Adult)   Pulse 71   Temp 36.4 °C (97.5 °F) (Temporal)   Resp 14   Ht 1.778 m (5' 10\")   Wt 62.5 kg (137 lb 12.8 oz)   SpO2 98%   BMI 19.77 kg/m²    Body mass index is 19.77 kg/m².    Gen: Alert and oriented, No apparent distress.  HEENT: Head atraumatic, normocephalic. Pupils equal and round.  Ext: No clubbing, cyanosis, edema.    Assessment & Plan:     76 y.o. male with the following -     1. Monoclonal gammopathy  Newly diagnosed MGUS - IgM type kappa monoclonal protein with decreased IgG.  See orders below, pending results, will refer to hematology.  Patient verbally approved of E consult if necessary.  - PROTEIN ELECTROPHORESIS, UR; Future  - Free Light Chains, Qnt, Ur; Future  - LDH; Future  - BETA-2-MICROGLOBULIN; Future  - CRP QUANTITIVE (NON-CARDIAC); Future    2. Macrocytosis  Likely related to MGUS    3. IGT (impaired glucose tolerance)  Improving, continue to monitor.      Return for Will notify patient to follow-up pending tests.    Etta Carter) Heath SADLER  Physician Assistant Certified  [facility]    Please note that this dictation was created using voice recognition software. I have made every reasonable attempt to correct obvious errors, but I expect that there are errors of grammar and possibly content that I did not discover before finalizing the note.  "

## 2021-11-30 ENCOUNTER — HOSPITAL ENCOUNTER (OUTPATIENT)
Dept: LAB | Facility: MEDICAL CENTER | Age: 76
End: 2021-11-30
Attending: PHYSICIAN ASSISTANT
Payer: MEDICARE

## 2021-11-30 DIAGNOSIS — D47.2 MONOCLONAL GAMMOPATHY: ICD-10-CM

## 2021-11-30 LAB
CRP SERPL HS-MCNC: <0.3 MG/DL (ref 0–0.75)
LDH SERPL L TO P-CCNC: 179 U/L (ref 107–266)

## 2021-11-30 PROCEDURE — 83520 IMMUNOASSAY QUANT NOS NONAB: CPT | Mod: 91

## 2021-11-30 PROCEDURE — 83615 LACTATE (LD) (LDH) ENZYME: CPT

## 2021-11-30 PROCEDURE — 86140 C-REACTIVE PROTEIN: CPT

## 2021-11-30 PROCEDURE — 84156 ASSAY OF PROTEIN URINE: CPT | Mod: 91

## 2021-11-30 PROCEDURE — 82232 ASSAY OF BETA-2 PROTEIN: CPT

## 2021-11-30 PROCEDURE — 36415 COLL VENOUS BLD VENIPUNCTURE: CPT

## 2021-12-03 LAB — B2 MICROGLOB SERPL-MCNC: 1.6 MG/L (ref 1.1–2.4)

## 2021-12-05 LAB
COLLECT DURATION TIME SPEC: NORMAL HRS
KAPPA LC FREE 24H UR-MRATE: NORMAL MG/D
KAPPA LC FREE UR-MCNC: 25.28 MG/L (ref 0–32.9)
LAMBDA LC FREE 24H UR-MRATE: NORMAL MG/D
LAMBDA LC FREE UR-MCNC: 2.3 MG/L (ref 0–3.79)
PROT 24H UR-MRATE: NORMAL MG/D
SPECIMEN VOL ?TM UR: NORMAL ML

## 2021-12-06 LAB
ALBUMIN 24H MFR UR ELPH: 41.3 %
ALPHA1 GLOB 24H MFR UR ELPH: 20.9 %
ALPHA2 GLOB 24H MFR UR ELPH: 17.4 %
B-GLOBULIN 24H MFR UR ELPH: 12.3 %
EER MONOCLONAL PROTEIN STUDY, 24 HOUR U Q5964: NORMAL
GAMMA GLOB 24H MFR UR ELPH: 8.1 %
INTERPRETATION UR IFE-IMP: NORMAL
M PROTEIN 24H MFR UR ELPH: 0 %
M PROTEIN 24H UR ELPH-MRATE: NORMAL MG/24 HRS
PROT 24H UR-MRATE: NORMAL MG/D (ref 40–150)
PROT UR-MCNC: 5 MG/DL

## 2021-12-07 ENCOUNTER — OFFICE VISIT (OUTPATIENT)
Dept: MEDICAL GROUP | Facility: IMAGING CENTER | Age: 76
End: 2021-12-07
Payer: MEDICARE

## 2021-12-07 ENCOUNTER — E-CONSULT (OUTPATIENT)
Dept: HEMATOLOGY ONCOLOGY | Facility: MEDICAL CENTER | Age: 76
End: 2021-12-07

## 2021-12-07 VITALS
DIASTOLIC BLOOD PRESSURE: 68 MMHG | WEIGHT: 139 LBS | BODY MASS INDEX: 19.9 KG/M2 | HEART RATE: 54 BPM | HEIGHT: 70 IN | TEMPERATURE: 97.7 F | SYSTOLIC BLOOD PRESSURE: 126 MMHG | OXYGEN SATURATION: 98 %

## 2021-12-07 DIAGNOSIS — Z71.9 ENCOUNTER FOR CONSULTATION: ICD-10-CM

## 2021-12-07 DIAGNOSIS — D47.2 MONOCLONAL GAMMOPATHY: ICD-10-CM

## 2021-12-07 PROCEDURE — 99999 PR NO CHARGE: CPT | Performed by: PHYSICIAN ASSISTANT

## 2021-12-07 PROCEDURE — 99213 OFFICE O/P EST LOW 20 MIN: CPT | Performed by: PHYSICIAN ASSISTANT

## 2021-12-07 ASSESSMENT — FIBROSIS 4 INDEX: FIB4 SCORE: 1.202830188679245283

## 2021-12-07 ASSESSMENT — PAIN SCALES - GENERAL: PAINLEVEL: NO PAIN

## 2021-12-07 NOTE — PROGRESS NOTES
"Subjective:     CC:   Chief Complaint   Patient presents with   • Lab Results       HPI:   Chirag presents today to discuss:    Monoclonal gammopathy  SPEP ordered due to mild macrocytosis which showed an M spike in the beta gamma region.  Immunofixation pattern showed an IgM type kappa monoclonal protein with decreased IgG level.  No CRAB. No M-protein detected on UPEP.  Urine MARJAN shows a suspicious faint band in the gamma region with specificity.  LDH, beta-2 microglobulin, CRP within normal limits.      Past Medical History:   Diagnosis Date   • Cataract     alonso iol   • DDD (degenerative disc disease), cervical    • DDD (degenerative disc disease), cervical    • DDD (degenerative disc disease), lumbar    • Hiatus hernia syndrome    • Lumbar spinal stenosis    • Vitamin D deficiency 2/2/2018     Social History     Tobacco Use   • Smoking status: Never Smoker   • Smokeless tobacco: Never Used   Vaping Use   • Vaping Use: Never used   Substance Use Topics   • Alcohol use: Yes     Alcohol/week: 0.6 oz     Types: 1 Glasses of wine per week     Comment: 4-5/week; beer once in a while   • Drug use: No     Current Outpatient Medications Ordered in Epic   Medication Sig Dispense Refill   • B Complex Vitamins (B COMPLEX-B12 PO) Take  by mouth.     • VITAMIN A PO Take  by mouth.     • Cholecalciferol (VITAMIN D3 PO) Take  by mouth.     • levothyroxine (SYNTHROID) 50 MCG Tab Take 1 Tablet by mouth every morning on an empty stomach. 90 Tablet 1     No current Epic-ordered facility-administered medications on file.     Patient has no known allergies.    ROS: see hpi  Gen: no fevers/chills  Pulm: no sob, no cough  CV: no chest pain, no palpitations, no edema  GI: no nausea/vomiting, no diarrhea  Skin: no rash    Objective:   Exam:  /68 (BP Location: Left arm, Patient Position: Sitting, BP Cuff Size: Adult)   Pulse (!) 54   Temp 36.5 °C (97.7 °F) (Temporal)   Ht 1.778 m (5' 10\")   Wt 63 kg (139 lb)   SpO2 98%   BMI " 19.94 kg/m²    Body mass index is 19.94 kg/m².    Gen: Alert and oriented, No apparent distress.  HEENT: Head atraumatic, normocephalic. Pupils equal and round.  Ext: No clubbing, cyanosis, edema.    Assessment & Plan:     76 y.o. male with the following -     1. Monoclonal gammopathy  IgM type kappa monoclonal protein with decreased IgG level.  Urine immunofixation shows a suspicious faint band in the gamma region with specificity.  We will send in a consult to hematology for further recommendations and next steps.    Return for Will notify patient to follow-up pending tests.     My total time spent caring for the patient on the day of the encounter was 20 minutes.   This does not include time spent on separately billable procedures/tests.      Etta Joe PA-C ([place]aker)  Physician Assistant Certified  [facility]    Please note that this dictation was created using voice recognition software. I have made every reasonable attempt to correct obvious errors, but I expect that there are errors of grammar and possibly content that I did not discover before finalizing the note.

## 2021-12-07 NOTE — ASSESSMENT & PLAN NOTE
SPEP ordered due to mild macrocytosis which showed an M spike in the beta gamma region.  Immunofixation pattern showed an IgM type kappa monoclonal protein with decreased IgG level.  No CRAB. No M-protein detected on UPEP.  Urine MARJAN shows a suspicious faint band in the gamma region with specificity.  LDH, beta-2 microglobulin, CRP within normal limits.

## 2021-12-07 NOTE — PROGRESS NOTES
E-Consult Response     After careful review of the patient's information available in the medical record, the following are my findings and recommendations:    Reason for consult:  IgM kappa monoclonal protein    Summary of data reviewed: IgM elevated at 980 with low G and normal A. Kappa:lambda ratio 2.3. CBC, CMP basically normal    Recommendations: This patient has either Waldenstrom's macroglobulinemia or IgM MGUS or IgM myeloma. Next step would be a bone marrow biopsy. You can order at [facility] or send to Melani in the [facility] and she can take care of it for you. (It's a paper order, not in Epic).    E-Consult Time: 10 minutes were spent with >50% of the total time spent reviewing items outlined in the summary of data reviewed (Use code [id number])    Carson Pineda M.D.

## 2021-12-08 ENCOUNTER — TELEPHONE (OUTPATIENT)
Dept: MEDICAL GROUP | Facility: IMAGING CENTER | Age: 76
End: 2021-12-08

## 2021-12-08 ENCOUNTER — APPOINTMENT (OUTPATIENT)
Dept: MEDICAL GROUP | Facility: IMAGING CENTER | Age: 76
End: 2021-12-08
Payer: MEDICARE

## 2021-12-08 NOTE — TELEPHONE ENCOUNTER
Called patient about E consult. Will have him make an appointment with Melani Carlton regarding MGUS. Number for [facility] office provided to patient, as he is unable to access his MyChart. All questions answered.    Etta Joe PA-C (Baker)  Physician Assistant Certified  [facility]

## 2021-12-08 NOTE — PROGRESS NOTES
Left voicemail for patient to review E-consult.  At this time, I will place a referral to Melani MEANS to discuss further work-up of MGUS, including the possibility of bone marrow biopsy.    Etta Joe PA-C (Baker)  Physician Assistant Certified  [facility]

## 2021-12-15 ENCOUNTER — OFFICE VISIT (OUTPATIENT)
Dept: HEMATOLOGY ONCOLOGY | Facility: MEDICAL CENTER | Age: 76
End: 2021-12-15
Payer: MEDICARE

## 2021-12-15 VITALS
DIASTOLIC BLOOD PRESSURE: 70 MMHG | SYSTOLIC BLOOD PRESSURE: 116 MMHG | WEIGHT: 139.55 LBS | HEART RATE: 77 BPM | BODY MASS INDEX: 19.98 KG/M2 | TEMPERATURE: 99.3 F | RESPIRATION RATE: 18 BRPM | HEIGHT: 70 IN | OXYGEN SATURATION: 99 %

## 2021-12-15 DIAGNOSIS — D47.2 IGM MONOCLONAL GAMMOPATHY OF UNCERTAIN SIGNIFICANCE: ICD-10-CM

## 2021-12-15 PROCEDURE — 99204 OFFICE O/P NEW MOD 45 MIN: CPT | Performed by: NURSE PRACTITIONER

## 2021-12-15 ASSESSMENT — ENCOUNTER SYMPTOMS
WHEEZING: 0
WEIGHT LOSS: 0
HEADACHES: 0
BLOOD IN STOOL: 0
FEVER: 0
DIZZINESS: 1
PALPITATIONS: 0
SHORTNESS OF BREATH: 0
TINGLING: 1
CHILLS: 0
NAUSEA: 0
CONSTIPATION: 0
VOMITING: 0
DIAPHORESIS: 1
DIARRHEA: 0
INSOMNIA: 0
COUGH: 1

## 2021-12-15 ASSESSMENT — FIBROSIS 4 INDEX: FIB4 SCORE: 1.202830188679245283

## 2021-12-15 ASSESSMENT — PAIN SCALES - GENERAL: PAINLEVEL: NO PAIN

## 2021-12-15 NOTE — PROGRESS NOTES
Subjective     Chirag Damon Jr. is a 76 y.o. male who presents as a New Patient (IC New/MGUS)          HPI    Patient referred to me, Intake Oncology Coordinator by his PCP Etta Joe PA-C for abnormal labs.  Patient is unaccompanied for today's visit.    Back in November 2021 patient's PCP noted that he had history of macrocytosis with anemia.  Therefore based on these findings he was sent for further labs, eluding SPEP, immunoglobulins and and UPEP.  SPEP showed an M spike in the beta/gamma region with a monoclonal protein accounting for 0.86 of the total protein in the beta and gamma regions.  UPEP completed which showed no M protein detected.  The urine immunofixation showed a suspicious band in the gamma region.  Immunoglobulins showed an elevated IgM at 980 with a low IgG at 745, but a normal IgA.  Patient has had very mild anemia in the past but his most recent CBC was normal.  Patient's kidney function and calcium levels are within normal limits.  I personally reviewed these lab results in detail and reviewed with the patient today.    Clinically patient appears to be stable.  He does note he has night sweats at times but not drenching.  He denies any fevers, chills fatigue or unplanned weight loss.  He is very active and he rides bicycle and goes to the gym often.  He does complain of some postnasal drip which causes an initial cough in the morning but no shortness of breath or wheezing.  He does have some joint pain from arthritis.  He also notes some initial dizziness in the morning and is also stated he has some tingling after sleeping or sitting in an abnormal position but it does resolve.    Please see past medical and surgical history below.    Patient with a family history of cancer in his mother who was diagnosed with breast cancer at the age of 50.  Paternal grandmother diagnosed with colon cancer.    Patient is a never smoker.    Family History   Problem Relation Age of Onset   • Cancer  Mother 50        breast   • Cancer Paternal Grandmother 70        colon cancer     Social History     Socioeconomic History   • Marital status:      Spouse name: Not on file   • Number of children: Not on file   • Years of education: Not on file   • Highest education level: Not on file   Occupational History   • Not on file   Tobacco Use   • Smoking status: Never Smoker   • Smokeless tobacco: Never Used   Vaping Use   • Vaping Use: Never used   Substance and Sexual Activity   • Alcohol use: Yes     Alcohol/week: 0.6 oz     Types: 1 Glasses of wine per week     Comment: 4-5/week; beer once in a while   • Drug use: No   • Sexual activity: Yes     Comment: , 2 sons,    Other Topics Concern   • Not on file   Social History Narrative   • Not on file     Social Determinants of Health     Financial Resource Strain:    • Difficulty of Paying Living Expenses: Not on file   Food Insecurity:    • Worried About Running Out of Food in the Last Year: Not on file   • Ran Out of Food in the Last Year: Not on file   Transportation Needs:    • Lack of Transportation (Medical): Not on file   • Lack of Transportation (Non-Medical): Not on file   Physical Activity:    • Days of Exercise per Week: Not on file   • Minutes of Exercise per Session: Not on file   Stress:    • Feeling of Stress : Not on file   Social Connections:    • Frequency of Communication with Friends and Family: Not on file   • Frequency of Social Gatherings with Friends and Family: Not on file   • Attends Rastafari Services: Not on file   • Active Member of Clubs or Organizations: Not on file   • Attends Club or Organization Meetings: Not on file   • Marital Status: Not on file   Intimate Partner Violence:    • Fear of Current or Ex-Partner: Not on file   • Emotionally Abused: Not on file   • Physically Abused: Not on file   • Sexually Abused: Not on file   Housing Stability:    • Unable to Pay for Housing in the Last Year: Not on file   • Number  "of Places Lived in the Last Year: Not on file   • Unstable Housing in the Last Year: Not on file       Review of Systems   Constitutional: Positive for diaphoresis (not drenching - but does have sweats). Negative for chills, fever, malaise/fatigue and weight loss.   HENT: Positive for congestion (post nasal drip).    Respiratory: Positive for cough (initially in the AM from post-nasal drip). Negative for shortness of breath and wheezing.    Cardiovascular: Negative for chest pain and palpitations.   Gastrointestinal: Negative for blood in stool, constipation, diarrhea, nausea and vomiting.   Genitourinary: Negative for dysuria and hematuria.   Musculoskeletal: Positive for joint pain (arthritis).   Skin: Negative for itching and rash.   Neurological: Positive for dizziness (initially in the AM ) and tingling (with abnormal position but resolves). Negative for headaches.   Psychiatric/Behavioral: The patient does not have insomnia.             Objective     /70   Pulse 77   Temp 37.4 °C (99.3 °F) (Temporal)   Resp 18   Ht 1.778 m (5' 10\")   Wt 63.3 kg (139 lb 8.8 oz)   SpO2 99%   BMI 20.02 kg/m²      Physical Exam  Vitals reviewed.   Constitutional:       General: He is not in acute distress.     Appearance: Normal appearance. He is well-developed. He is not diaphoretic.   HENT:      Head: Normocephalic and atraumatic.      Mouth/Throat:      Mouth: Mucous membranes are moist.      Pharynx: Oropharynx is clear. No oropharyngeal exudate.   Eyes:      General: No scleral icterus.        Right eye: No discharge.         Left eye: No discharge.      Conjunctiva/sclera: Conjunctivae normal.      Pupils: Pupils are equal, round, and reactive to light.   Neck:      Thyroid: No thyromegaly.   Cardiovascular:      Rate and Rhythm: Normal rate and regular rhythm.      Pulses: Normal pulses.      Heart sounds: Normal heart sounds. No murmur heard.  No friction rub. No gallop.    Pulmonary:      Effort: Pulmonary " effort is normal. No respiratory distress.      Breath sounds: Normal breath sounds. No wheezing.   Chest:   Breasts:      Right: No supraclavicular adenopathy.      Left: No supraclavicular adenopathy.       Abdominal:      General: Bowel sounds are normal. There is no distension.      Palpations: Abdomen is soft.      Tenderness: There is no abdominal tenderness.   Musculoskeletal:         General: No tenderness. Normal range of motion.      Cervical back: Normal range of motion and neck supple. No muscular tenderness.   Lymphadenopathy:      Head:      Right side of head: No submental, submandibular, tonsillar, preauricular, posterior auricular or occipital adenopathy.      Left side of head: No submental, submandibular, tonsillar, preauricular, posterior auricular or occipital adenopathy.      Cervical: No cervical adenopathy.      Upper Body:      Right upper body: No supraclavicular adenopathy.      Left upper body: No supraclavicular adenopathy.   Skin:     General: Skin is warm and dry.      Coloration: Skin is not pale.      Findings: No erythema or rash.   Neurological:      Mental Status: He is alert and oriented to person, place, and time.   Psychiatric:         Mood and Affect: Mood normal.         Behavior: Behavior normal.            Results for ISABELLA GARBER JRYara (MRN [id number])    Ref. Range 11/12/2021 10:45   WBC Latest Ref Range: 4.8 - 10.8 K/uL 6.8   RBC Latest Ref Range: 4.70 - 6.10 M/uL 4.10 (L)   Hemoglobin Latest Ref Range: 14.0 - 18.0 g/dL 14.1   Hematocrit Latest Ref Range: 42.0 - 52.0 % 40.1 (L)   MCV Latest Ref Range: 81.4 - 97.8 fL 97.8   MCH Latest Ref Range: 27.0 - 33.0 pg 34.4 (H)   MCHC Latest Ref Range: 33.7 - 35.3 g/dL 35.2   RDW Latest Ref Range: 35.9 - 50.0 fL 46.5   Platelet Count Latest Ref Range: 164 - 446 K/uL 265   MPV Latest Ref Range: 9.0 - 12.9 fL 8.4 (L)   Neutrophils-Polys Latest Ref Range: 44.00 - 72.00 % 65.70   Neutrophils (Absolute) Latest Ref Range: 1.82 - 7.42  K/uL 4.48   Lymphocytes Latest Ref Range: 22.00 - 41.00 % 23.60   Lymphs (Absolute) Latest Ref Range: 1.00 - 4.80 K/uL 1.61   Monocytes Latest Ref Range: 0.00 - 13.40 % 9.20   Monos (Absolute) Latest Ref Range: 0.00 - 0.85 K/uL 0.63   Eosinophils Latest Ref Range: 0.00 - 6.90 % 0.70   Eos (Absolute) Latest Ref Range: 0.00 - 0.51 K/uL 0.05   Basophils Latest Ref Range: 0.00 - 1.80 % 0.40   Baso (Absolute) Latest Ref Range: 0.00 - 0.12 K/uL 0.03   Immature Granulocytes Latest Ref Range: 0.00 - 0.90 % 0.40   Immature Granulocytes (abs) Latest Ref Range: 0.00 - 0.11 K/uL 0.03   Nucleated RBC Latest Units: /100 WBC 0.00   NRBC (Absolute) Latest Units: K/uL 0.00   Sodium Latest Ref Range: 135 - 145 mmol/L 136   Potassium Latest Ref Range: 3.6 - 5.5 mmol/L 4.3   Chloride Latest Ref Range: 96 - 112 mmol/L 101   Co2 Latest Ref Range: 20 - 33 mmol/L 26   Anion Gap Latest Ref Range: 7.0 - 16.0  9.0   Glucose Latest Ref Range: 65 - 99 mg/dL 92   Bun Latest Ref Range: 8 - 22 mg/dL 9   Creatinine Latest Ref Range: 0.50 - 1.40 mg/dL 0.86   GFR If  Latest Ref Range: >60 mL/min/1.73 m 2 >60   GFR If Non  Latest Ref Range: >60 mL/min/1.73 m 2 >60   Calcium Latest Ref Range: 8.5 - 10.5 mg/dL 9.6   AST(SGOT) Latest Ref Range: 12 - 45 U/L 17   ALT(SGPT) Latest Ref Range: 2 - 50 U/L 16   Alkaline Phosphatase Latest Ref Range: 30 - 99 U/L 60   Total Bilirubin Latest Ref Range: 0.1 - 1.5 mg/dL 0.4   Albumin Latest Ref Range: 3.2 - 4.9 g/dL 4.7   Total Protein Latest Ref Range: 6.0 - 8.2 g/dL 7.8   Globulin Latest Ref Range: 1.9 - 3.5 g/dL 3.1   A-G Ratio Latest Units: g/dL 1.5     LDH Total Latest Ref Range: 107 - 266 U/L 179          Results for JCARLOSISABELLA YBARRA JR. (MRN [id number])    Ref. 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  UPEP is negative for any monoclonal free light chains.  Discussed with patient diagnosis of MGUS at this time, however further work-up is required.  I will complete all lab work-up and request a serum free light chain evaluation.  Also based on these findings I will also proceed with a bone marrow biopsy for further evaluation.  Discussed with patient diagnosis of MGUS versus multiple myeloma versus WaldenStrom's macroglobulinemia.  Will request bone marrow biopsy be completed after the holidays and patient to follow-up with me in the clinic to review the results and discuss further plan of care at that time.    Patient did verbalize understanding is in agreement with the plan.      Please note that this dictation was created using voice recognition software. I have made every reasonable attempt to correct obvious errors, but I expect that there are errors of grammar and possibly content that I did not discover before finalizing the note.

## 2021-12-17 ENCOUNTER — TELEPHONE (OUTPATIENT)
Dept: HEMATOLOGY ONCOLOGY | Facility: MEDICAL CENTER | Age: 76
End: 2021-12-17

## 2021-12-17 NOTE — TELEPHONE ENCOUNTER
"Called patient to give BMB scheduling date:    Bone Marrow Biopsy  Date:01/14/21  Time: check in 2 hours prior  Location: [facility] 2nd Floor, Same Day Surgery  Preparation: Nothing to eat or drink 8 hrs prior and patient will need a  home for moderate sedation. If no sedation is wanted, no fasting or  home is required.     Patient seemed very rushed, when trying to give preparation he stated \"he will figure it out\" and hung up the phone before I could give him his [facility] appt date and time. Sent Nival message to reconfirm BMB prep and follow up appointment.   "

## 2021-12-20 ENCOUNTER — HOSPITAL ENCOUNTER (OUTPATIENT)
Facility: MEDICAL CENTER | Age: 76
End: 2021-12-20
Attending: INTERNAL MEDICINE | Admitting: INTERNAL MEDICINE
Payer: MEDICARE

## 2021-12-29 ENCOUNTER — HOSPITAL ENCOUNTER (OUTPATIENT)
Dept: LAB | Facility: MEDICAL CENTER | Age: 76
End: 2021-12-29
Attending: NURSE PRACTITIONER
Payer: MEDICARE

## 2021-12-29 DIAGNOSIS — D47.2 IGM MONOCLONAL GAMMOPATHY OF UNCERTAIN SIGNIFICANCE: ICD-10-CM

## 2021-12-29 PROCEDURE — 83520 IMMUNOASSAY QUANT NOS NONAB: CPT

## 2021-12-29 PROCEDURE — 36415 COLL VENOUS BLD VENIPUNCTURE: CPT

## 2021-12-30 NOTE — TELEPHONE ENCOUNTER
Surgery called regarding patients bone marrow biopsy scheduled for January 13. They state they are unable to get ahold of Chirag and need to speak with him prior to this appointment. I attempted to call Chirag, he did not answer. Please follow up next week.

## 2021-12-31 LAB
KAPPA LC FREE SER-MCNC: 29.98 MG/L (ref 3.3–19.4)
KAPPA LC FREE/LAMBDA FREE SER NEPH: 4.1 {RATIO} (ref 0.26–1.65)
LAMBDA LC FREE SERPL-MCNC: 7.31 MG/L (ref 5.71–26.3)

## 2022-01-08 NOTE — TELEPHONE ENCOUNTER
BLAYNEM on spouse's phone requesting that she give Randi message to please call pre admit back regarding his Bone Marrow Biopsy @ [phone] that they have been trying to contact him, and to call the office with any questions@ [phone].

## 2022-01-13 NOTE — OR NURSING
COVID-19 Pre-Surgery Screening:     Pt did not answer and phone would not allow me to leave a voicemail.

## 2022-01-18 ENCOUNTER — PRE-ADMISSION TESTING (OUTPATIENT)
Dept: ADMISSIONS | Facility: MEDICAL CENTER | Age: 77
End: 2022-01-18
Attending: INTERNAL MEDICINE
Payer: MEDICARE

## 2022-01-19 ENCOUNTER — APPOINTMENT (OUTPATIENT)
Dept: HEMATOLOGY ONCOLOGY | Facility: MEDICAL CENTER | Age: 77
End: 2022-01-19
Payer: MEDICARE

## 2022-01-20 NOTE — OR NURSING
COVID-19 Pre-Surgery Screenin. Do you have an undiagnosed respiratory illness or symptoms such as coughing or sneezing? NO     • Onset of Sx: -    • Acute vs. chronic respiratory illness: -     2. Do you have an unexplained fever greater than 100.4 degrees Fahrenheit or 38 degrees Celsius? NO  ?  3. Have you had direct exposure to a patient who tested positive for Covid-19? NO    4. Patient informed of current visitation and mask policies by this RN.   DISPLAY PLAN FREE TEXT

## 2022-01-21 ENCOUNTER — HOSPITAL ENCOUNTER (OUTPATIENT)
Facility: MEDICAL CENTER | Age: 77
End: 2022-01-21
Attending: INTERNAL MEDICINE | Admitting: INTERNAL MEDICINE
Payer: MEDICARE

## 2022-01-21 VITALS
HEART RATE: 59 BPM | RESPIRATION RATE: 14 BRPM | HEIGHT: 70 IN | OXYGEN SATURATION: 98 % | SYSTOLIC BLOOD PRESSURE: 149 MMHG | DIASTOLIC BLOOD PRESSURE: 80 MMHG | WEIGHT: 134.48 LBS | TEMPERATURE: 97.8 F | BODY MASS INDEX: 19.25 KG/M2

## 2022-01-21 DIAGNOSIS — D75.89 MACROCYTOSIS: ICD-10-CM

## 2022-01-21 DIAGNOSIS — D47.2 MONOCLONAL GAMMOPATHY: ICD-10-CM

## 2022-01-21 LAB
BASOPHILS # BLD AUTO: 0.4 % (ref 0–1.8)
BASOPHILS # BLD: 0.02 K/UL (ref 0–0.12)
EKG IMPRESSION: NORMAL
EOSINOPHIL # BLD AUTO: 0.07 K/UL (ref 0–0.51)
EOSINOPHIL NFR BLD: 1.5 % (ref 0–6.9)
ERYTHROCYTE [DISTWIDTH] IN BLOOD BY AUTOMATED COUNT: 45.8 FL (ref 35.9–50)
EXTERNAL QUALITY CONTROL: NORMAL
HCT VFR BLD AUTO: 40.1 % (ref 42–52)
HGB BLD-MCNC: 13.8 G/DL (ref 14–18)
HGB RETIC QN AUTO: 35.9 PG/CELL (ref 29–35)
IMM GRANULOCYTES # BLD AUTO: 0.01 K/UL (ref 0–0.11)
IMM GRANULOCYTES NFR BLD AUTO: 0.2 % (ref 0–0.9)
IMM RETICS NFR: 8.3 % (ref 9.3–17.4)
LYMPHOCYTES # BLD AUTO: 1.59 K/UL (ref 1–4.8)
LYMPHOCYTES NFR BLD: 35 % (ref 22–41)
MCH RBC QN AUTO: 33.5 PG (ref 27–33)
MCHC RBC AUTO-ENTMCNC: 34.4 G/DL (ref 33.7–35.3)
MCV RBC AUTO: 97.3 FL (ref 81.4–97.8)
MONOCYTES # BLD AUTO: 0.46 K/UL (ref 0–0.85)
MONOCYTES NFR BLD AUTO: 10.1 % (ref 0–13.4)
NEUTROPHILS # BLD AUTO: 2.39 K/UL (ref 1.82–7.42)
NEUTROPHILS NFR BLD: 52.8 % (ref 44–72)
NRBC # BLD AUTO: 0 K/UL
NRBC BLD-RTO: 0 /100 WBC
PATHOLOGY CONSULT NOTE: NORMAL
PLATELET # BLD AUTO: 254 K/UL (ref 164–446)
PMV BLD AUTO: 8 FL (ref 9–12.9)
RBC # BLD AUTO: 4.12 M/UL (ref 4.7–6.1)
RETICS # AUTO: 0.04 M/UL (ref 0.04–0.06)
RETICS/RBC NFR: 1 % (ref 0.8–2.1)
SARS-COV+SARS-COV-2 AG RESP QL IA.RAPID: POSITIVE
WBC # BLD AUTO: 4.5 K/UL (ref 4.8–10.8)

## 2022-01-21 PROCEDURE — 85046 RETICYTE/HGB CONCENTRATE: CPT

## 2022-01-21 PROCEDURE — 700111 HCHG RX REV CODE 636 W/ 250 OVERRIDE (IP): Performed by: STUDENT IN AN ORGANIZED HEALTH CARE EDUCATION/TRAINING PROGRAM

## 2022-01-21 PROCEDURE — 88369 M/PHMTRC ALYSISHQUANT/SEMIQ: CPT

## 2022-01-21 PROCEDURE — 88305 TISSUE EXAM BY PATHOLOGIST: CPT

## 2022-01-21 PROCEDURE — 88342 IMHCHEM/IMCYTCHM 1ST ANTB: CPT

## 2022-01-21 PROCEDURE — 93005 ELECTROCARDIOGRAM TRACING: CPT | Performed by: INTERNAL MEDICINE

## 2022-01-21 PROCEDURE — 160027 HCHG SURGERY MINUTES - 1ST 30 MINS LEVEL 2: Performed by: STUDENT IN AN ORGANIZED HEALTH CARE EDUCATION/TRAINING PROGRAM

## 2022-01-21 PROCEDURE — 85025 COMPLETE CBC W/AUTO DIFF WBC: CPT

## 2022-01-21 PROCEDURE — 99152 MOD SED SAME PHYS/QHP 5/>YRS: CPT | Performed by: STUDENT IN AN ORGANIZED HEALTH CARE EDUCATION/TRAINING PROGRAM

## 2022-01-21 PROCEDURE — 160048 HCHG OR STATISTICAL LEVEL 1-5: Performed by: STUDENT IN AN ORGANIZED HEALTH CARE EDUCATION/TRAINING PROGRAM

## 2022-01-21 PROCEDURE — 99152 MOD SED SAME PHYS/QHP 5/>YRS: CPT | Mod: 59 | Performed by: STUDENT IN AN ORGANIZED HEALTH CARE EDUCATION/TRAINING PROGRAM

## 2022-01-21 PROCEDURE — 700105 HCHG RX REV CODE 258: Performed by: INTERNAL MEDICINE

## 2022-01-21 PROCEDURE — 93010 ELECTROCARDIOGRAM REPORT: CPT | Performed by: INTERNAL MEDICINE

## 2022-01-21 PROCEDURE — 88368 INSITU HYBRIDIZATION MANUAL: CPT

## 2022-01-21 PROCEDURE — 88360 TUMOR IMMUNOHISTOCHEM/MANUAL: CPT | Mod: 91

## 2022-01-21 PROCEDURE — 88311 DECALCIFY TISSUE: CPT

## 2022-01-21 PROCEDURE — 160025 RECOVERY II MINUTES (STATS): Performed by: STUDENT IN AN ORGANIZED HEALTH CARE EDUCATION/TRAINING PROGRAM

## 2022-01-21 PROCEDURE — 88341 IMHCHEM/IMCYTCHM EA ADD ANTB: CPT

## 2022-01-21 PROCEDURE — 38222 DX BONE MARROW BX & ASPIR: CPT | Performed by: STUDENT IN AN ORGANIZED HEALTH CARE EDUCATION/TRAINING PROGRAM

## 2022-01-21 PROCEDURE — 87426 SARSCOV CORONAVIRUS AG IA: CPT | Performed by: INTERNAL MEDICINE

## 2022-01-21 PROCEDURE — 160046 HCHG PACU - 1ST 60 MINS PHASE II: Performed by: STUDENT IN AN ORGANIZED HEALTH CARE EDUCATION/TRAINING PROGRAM

## 2022-01-21 PROCEDURE — 88313 SPECIAL STAINS GROUP 2: CPT | Mod: 91

## 2022-01-21 RX ORDER — SODIUM CHLORIDE, SODIUM LACTATE, POTASSIUM CHLORIDE, CALCIUM CHLORIDE 600; 310; 30; 20 MG/100ML; MG/100ML; MG/100ML; MG/100ML
INJECTION, SOLUTION INTRAVENOUS CONTINUOUS
Status: DISCONTINUED | OUTPATIENT
Start: 2022-01-21 | End: 2022-01-21 | Stop reason: HOSPADM

## 2022-01-21 RX ORDER — SODIUM CHLORIDE 9 MG/ML
500 INJECTION, SOLUTION INTRAVENOUS
Status: DISCONTINUED | OUTPATIENT
Start: 2022-01-21 | End: 2022-01-21 | Stop reason: HOSPADM

## 2022-01-21 RX ORDER — MIDAZOLAM HYDROCHLORIDE 1 MG/ML
INJECTION INTRAMUSCULAR; INTRAVENOUS
Status: DISCONTINUED
Start: 2022-01-21 | End: 2022-01-21 | Stop reason: HOSPADM

## 2022-01-21 RX ORDER — MIDAZOLAM HYDROCHLORIDE 1 MG/ML
.5-2 INJECTION INTRAMUSCULAR; INTRAVENOUS PRN
Status: DISCONTINUED | OUTPATIENT
Start: 2022-01-21 | End: 2022-01-21 | Stop reason: HOSPADM

## 2022-01-21 RX ADMIN — SODIUM CHLORIDE, POTASSIUM CHLORIDE, SODIUM LACTATE AND CALCIUM CHLORIDE: 600; 310; 30; 20 INJECTION, SOLUTION INTRAVENOUS at 09:13

## 2022-01-21 ASSESSMENT — FIBROSIS 4 INDEX: FIB4 SCORE: 1.202830188679245283

## 2022-01-21 NOTE — OR NURSING
1150- patient to phase 2.  2 identifiers completed.  Patient alert and oriented x4.  Reports no pain.  Band aid on left hip.  Clean, dry, and intact.  Provided sprite, and tolerating well.  VSS.  Phoned his wife to notify for coming to hospital for transportation home.     1200- DC instructions reviewed with patient's wife.  All questions answered.    1210- patient changed into personal clothing.  Tolerating liquid intake.  Reports ready for DC.  Ambulated off unit with RN to patient's wife for transportation home.  All belongings accounted for.  PIV removed, tip intact and dressing placed.

## 2022-01-21 NOTE — PROCEDURES
Bone Marrow Biopsy/Aspiration    Date/Time: 1/21/2022 11:51 AM  Performed by: Tyrel Mcintyre M.D.  Authorized by: Tyrel Mcintyre M.D.     Consent:     Consent obtained:  Verbal and written    Consent given by:  Patient    Risks discussed:  Infection, bleeding, repeat procedure and pain    Alternatives discussed:  No treatment, delayed treatment and observation  Universal protocol:     Procedure explained and questions answered to patient or proxy's satisfaction: yes      Relevant documents present and verified: yes      Immediately prior to procedure a time out was called: yes      Site/side marked: yes      Patient identity confirmed:  Verbally with patient, arm band and provided demographic data  Pre-procedure details:     Procedure type:  Aspiration and biopsy    Requesting physician:  Dr. Pineda    Indications:  MGUS    Position:  Prone    Buttock laterality:  Left    Local anesthetic:  1% Lidocaine    Subcutaneous volume:  1 mL    : 1 mL.    Preparation: Patient was prepped and draped in usual sterile fashion    Sedation:     Patient Sedated: Yes      Sedation type: moderate (conscious) sedation      Sedation:  Midazolam (3 mg)    Analgesia:  Fentanyl (50 mcg)    SEDATION LENGTH: 10 minutes.  Procedure details:     Aspirate obtained:  5 mL followed by 5 mL    Biopsy performed:  1 core    Number of attempts:  1    Estimated blood loss (mL):  2  Post-procedure:     Puncture site:  Adhesive bandage applied and direct pressure applied    Patient tolerance of procedure:  Tolerated well, no immediate complications  Comments:      For the above procedure I also performed the conscious sedation and was directly involved with administration of medication, monitoring airway, vital signs, preventing complications.  Administered fentanyl and midazolam in titration fashion until achieving a level of moderate procedural sedation.  Patient tolerated procedure well without complications from sedation and was left in the care of  his bedside nurse and respiratory therapy. Procedural sedation time equals 10 minutes which was separate from procedure time as described above.  Start time: 1133  Stop time: 1143

## 2022-01-21 NOTE — DISCHARGE INSTRUCTIONS
BONE MARROW ASPIRATION & BIOPSY DISCHARGE INSTRUCTIONS    1. After the numbing medicine wears off, you may feel some discomfort.    2. Keep bandage clean and dry for 24 hours.  After this time, you can change the bandage.  You may now bathe or shower.    3. If bleeding occurs after your bone marrow aspiration or biopsy, apply pressure to the area and call your doctor immediately.    4. Call your doctor if pain persists for greater than 24 hours in the area where you had your aspiration or biopsy.    5. Call your doctor immediately if you notice redness or drainage in the area or if you have a fever.    6. Call your doctor if you have numbness or weakness in the area where the doctor took the bone marrow or down your leg.    7. Do not drive or drink alcohol for 24 hours if you have had sedation medication.  The medication will make you drowsy.    8. Resume your regular diet.    9. Follow up with your doctor.    10. Additional instructions: none    11. Prescriptions: none        I acknowledge receipt and understanding of these Home Care Instructions.

## 2022-01-26 NOTE — PROGRESS NOTES
Subjective     Chirag Damon Jr. is a 76 y.o. male who presents with Other (IC EST/ BMB Results)          HPI    Patient seen today in follow-up for bone marrow biopsy results. Patient unaccompanied for today's visit.     Back in November 2021 patient's PCP noted that he had history of macrocytosis with anemia.  Therefore based on these findings he was sent for further labs, including SPEP, immunoglobulins and and UPEP.  SPEP showed an M spike in the beta/gamma region with a monoclonal protein accounting for 0.86 of the total protein in the beta and gamma regions.  UPEP completed which showed no M protein detected.  The urine immunofixation showed a suspicious band in the gamma region.  Immunoglobulins showed an elevated IgM at 980 with a low IgG at 745, but a normal IgA.  Patient has had very mild anemia in the past but his most recent CBC was normal.  Patient's kidney function and calcium levels are within normal limits.    Patient was sent for further lab evaluation with serum free light chains as well as a bone marrow biopsy.  Patient presents today to review results.    Serum free light chain shows an elevated kappa light chain at 29.98, mildly elevated kappa/lambda light chain ratio at 4.10.    Bone marrow biopsy flow cytometry analysis shows monoclonal kappa restricted B-cell population without detectable CD5, CD10 or CD11c expression.  There is a small population of kappa restricted plasma cells.  Per the pathologist given the history of IgM monoclonal gammopathy this is most consistent with a lymphoplasmacytic lymphoma, also known as WaldenStrom's macroglobulinemia.  I personally reviewed the pathology report as well as discussed the results with the patient today.    Clinically patient is doing well.  He stated he tolerated the biopsy well with no significant side effects.  He has mild tenderness at the biopsy site but overall he is doing well.  He denies any complaints.    No Known Allergies  Current  "Outpatient Medications on File Prior to Visit   Medication Sig Dispense Refill   • B Complex Vitamins (B COMPLEX-B12 PO) Take  by mouth every day.     • VITAMIN A PO Take  by mouth every day.     • Cholecalciferol (VITAMIN D3 PO) Take  by mouth every day.     • levothyroxine (SYNTHROID) 50 MCG Tab Take 1 Tablet by mouth every morning on an empty stomach. 90 Tablet 1     No current facility-administered medications on file prior to visit.         Review of Systems   Constitutional: Negative for chills, fever, malaise/fatigue and weight loss.   Respiratory: Negative for cough and shortness of breath.    Cardiovascular: Negative for chest pain and palpitations.   Gastrointestinal: Negative for constipation, diarrhea, nausea and vomiting.   Musculoskeletal:        Mild tenderness at times from bone marrow biopsy site              Objective     /88   Pulse 72   Temp 36.8 °C (98.2 °F) (Temporal)   Resp 18   Ht 1.778 m (5' 10\")   Wt 62 kg (136 lb 11 oz)   SpO2 97%   BMI 19.61 kg/m²      Physical Exam  Vitals reviewed.   Constitutional:       General: He is not in acute distress.     Appearance: Normal appearance. He is not diaphoretic.   HENT:      Head: Normocephalic and atraumatic.   Cardiovascular:      Rate and Rhythm: Normal rate.      Heart sounds: Normal heart sounds. No murmur heard.  No friction rub. No gallop.    Pulmonary:      Effort: Pulmonary effort is normal.      Breath sounds: Normal breath sounds.   Skin:     General: Skin is warm and dry.   Neurological:      Mental Status: He is alert and oriented to person, place, and time.   Psychiatric:         Mood and Affect: Mood normal.         Behavior: Behavior normal.            Results for ISABELLA GARBER JR. (MRN [id number])    Ref. Range 12/29/2021 11:31   Free Kappa Light Chains Latest Ref Range: 3.30 - 19.40 mg/L 29.98 (H)   Free Lambda Light Chains Latest Ref Range: 5.71 - 26.30 mg/L 7.31   Kappa-Lambda Ratio Latest Ref Range: 0.26 - 1.65  " 4.10 (H)                    Assessment & Plan             1. 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  I reviewed the labs again in detail as well as bone marrow biopsy with patient today.  I will order a serum viscosity for patient to complete and referred him to hematology/oncology.  Dr. Pineda has agreed to see the patient.  Patient verbalized understanding and is in agreement the plan.    No further follow-up needed with [facility].      Please note that this dictation was created using voice recognition software. I have made every reasonable attempt to correct obvious errors, but I expect that there are errors of grammar and possibly content that I did not discover before finalizing the note.

## 2022-01-27 ENCOUNTER — OFFICE VISIT (OUTPATIENT)
Dept: HEMATOLOGY ONCOLOGY | Facility: MEDICAL CENTER | Age: 77
End: 2022-01-27
Payer: MEDICARE

## 2022-01-27 VITALS
RESPIRATION RATE: 18 BRPM | WEIGHT: 136.69 LBS | HEIGHT: 70 IN | TEMPERATURE: 98.2 F | SYSTOLIC BLOOD PRESSURE: 139 MMHG | DIASTOLIC BLOOD PRESSURE: 88 MMHG | HEART RATE: 72 BPM | BODY MASS INDEX: 19.57 KG/M2 | OXYGEN SATURATION: 97 %

## 2022-01-27 DIAGNOSIS — C88.0 WALDENSTROM MACROGLOBULINEMIA (HCC): ICD-10-CM

## 2022-01-27 PROCEDURE — 99213 OFFICE O/P EST LOW 20 MIN: CPT | Performed by: NURSE PRACTITIONER

## 2022-01-27 ASSESSMENT — ENCOUNTER SYMPTOMS
PALPITATIONS: 0
SHORTNESS OF BREATH: 0
NAUSEA: 0
CONSTIPATION: 0
VOMITING: 0
COUGH: 0
CHILLS: 0
FEVER: 0
DIARRHEA: 0
WEIGHT LOSS: 0

## 2022-01-27 ASSESSMENT — FIBROSIS 4 INDEX: FIB4 SCORE: 1.25

## 2022-01-27 ASSESSMENT — PAIN SCALES - GENERAL: PAINLEVEL: NO PAIN

## 2022-02-03 ENCOUNTER — HOSPITAL ENCOUNTER (OUTPATIENT)
Dept: LAB | Facility: MEDICAL CENTER | Age: 77
End: 2022-02-03
Attending: NURSE PRACTITIONER
Payer: MEDICARE

## 2022-02-03 DIAGNOSIS — C88.0 WALDENSTROM MACROGLOBULINEMIA (HCC): ICD-10-CM

## 2022-02-03 PROCEDURE — 36415 COLL VENOUS BLD VENIPUNCTURE: CPT

## 2022-02-03 PROCEDURE — 85810 BLOOD VISCOSITY EXAMINATION: CPT

## 2022-02-06 LAB — VISC SER: 1.37 CP (ref 1.1–1.8)

## 2022-02-08 DIAGNOSIS — E03.9 HYPOTHYROIDISM, UNSPECIFIED TYPE: ICD-10-CM

## 2022-02-08 RX ORDER — LEVOTHYROXINE SODIUM 0.05 MG/1
50 TABLET ORAL
Qty: 90 TABLET | Refills: 1 | Status: SHIPPED | OUTPATIENT
Start: 2022-02-08 | End: 2022-06-10 | Stop reason: SDUPTHER

## 2022-02-08 NOTE — PROGRESS NOTES
02/15/22    Subjective    Chief Complaint:  Waldenstrom's macroglobulinemia    HPI:  76 male referred by Melani Carlton in the [facility] program for newly diagnosed Waldenström's macroglobulinemia. Patient was initially evaluated in November 2021 for a macrocytic anemia. Work up revealed an elevated IgM at 980, with a low IgG and normal IgA. Kappa light chains and kappa/lambda ratio were also elevated. A BMX was done 1/21/22 was consistent with a lymphoplasmacytic lymphoma, MYD88 positive. Viscosity is normal at 1.37. Minimally anemic. Does have symptoms of neuropathy in his feet.     ROS:    Constitutional: No weight loss  Skin: No rash or jaundice  HENT: No change in eyesight or hearing  Cardiovascular:No chest pain or arrythmia  Respiratory:No cough or SOB  GI:No nausea, vomiting, diarrhea, constipation  :No dysuria or frequency  Musculoskeletal:No bone or joint pain  Neuro:No sx's of neuropathy  Psych: No complaints    PMH:      No Known Allergies    Past Medical History:   Diagnosis Date   • Cataract     alonso iol   • DDD (degenerative disc disease), cervical    • DDD (degenerative disc disease), cervical    • DDD (degenerative disc disease), lumbar    • Heart murmur    • Hiatus hernia syndrome    • Lumbar spinal stenosis    • Vitamin D deficiency 2/2/2018        Past Surgical History:   Procedure Laterality Date   • MO DX BONE MARROW ASPIRATIONS Left 1/21/2022    Procedure: ASPIRATION, BONE MARROW - DR SANCHEZ;  Surgeon: Tyrel Mcintyre M.D.;  Location: SURGERY SAME DAY [facility];  Service: Orthopedics   • MO DX BONE MARROW BIOPSIES Left 1/21/2022    Procedure: BIOPSY, BONE MARROW, USING NEEDLE OR TROCAR;  Surgeon: Tyrel Mcintyre M.D.;  Location: SURGERY SAME DAY [facility];  Service: Orthopedics   • INGUINAL HERNIA REPAIR ROBOTIC Left 4/8/2016    Procedure: INGUINAL HERNIA REPAIR ROBOTIC WITH MESH;  Surgeon: Dustin Maria M.D.;  Location: SURGERY SAME DAY [facility];  Service:    • OTHER  2006    alonso iol   •  "CATARACT EXTRACTION WITH IOL Bilateral    • HERNIA REPAIR      bilat        Medications:    Current Outpatient Medications on File Prior to Visit   Medication Sig Dispense Refill   • levothyroxine (SYNTHROID) 50 MCG Tab Take 1 Tablet by mouth every morning on an empty stomach. 90 Tablet 1   • B Complex Vitamins (B COMPLEX-B12 PO) Take  by mouth every day.     • VITAMIN A PO Take  by mouth every day.     • Cholecalciferol (VITAMIN D3 PO) Take  by mouth every day.       No current facility-administered medications on file prior to visit.       Social History     Tobacco Use   • Smoking status: Never Smoker   • Smokeless tobacco: Never Used   Substance Use Topics   • Alcohol use: Yes     Alcohol/week: 0.6 oz     Types: 1 Glasses of wine per week     Comment: 4-5/week; beer once in a while. 1/18/22: 1 drink per day.        Family History   Problem Relation Age of Onset   • Cancer Mother 50        breast   • Cancer Paternal Grandmother 70        colon cancer        Objective    Vitals:    /62   Pulse 60   Temp 37 °C (98.6 °F) (Temporal)   Resp 16   Ht 1.778 m (5' 10\")   Wt 64.9 kg (143 lb 3 oz)   SpO2 98%   BMI 20.55 kg/m²     Physical Exam:    Appears well-developed and well-nourished. No distress.    Head -  Normocephalic .   Eyes - Pupils are equal. Conjunctivae normal. No scleral icterus.   Ears - normal hearing  Neck - Normal range of motion. Neck supple. No thyromegaly  Cardiovascular - Normal rate, regular rhythm, normal heart sounds and intact distal pulses. No  gallop, murmur or rub  Pulmonary - Normal breath sounds.  No wheeze, rales or rhonci  Abdominal -Soft. No distension, tenderness, organomegaly or mass  Extremities-  No edema or tenderness.    Nodes - No submental, submandibular, preauricular, cervical, axillary or inguinal adenopathy.    Neurological -   Alert and oriented   Skin - Skin is warm and dry. No rash noted. Not diaphoretic. No erythema. No pallor. No jaundice   Psychiatric -  " Normal mood and affect.    Labs:    Results for ISABELLA GARBER JR. (MRN [id number])    Ref. Range 1/21/2022 08:55   WBC Latest Ref Range: 4.8 - 10.8 K/uL 4.5 (L)   RBC Latest Ref Range: 4.70 - 6.10 M/uL 4.12 (L)   Hemoglobin Latest Ref Range: 14.0 - 18.0 g/dL 13.8 (L)   Hematocrit Latest Ref Range: 42.0 - 52.0 % 40.1 (L)   MCV Latest Ref Range: 81.4 - 97.8 fL 97.3   MCH Latest Ref Range: 27.0 - 33.0 pg 33.5 (H)   MCHC Latest Ref Range: 33.7 - 35.3 g/dL 34.4   RDW Latest Ref Range: 35.9 - 50.0 fL 45.8   Platelet Count Latest Ref Range: 164 - 446 K/uL 254   Results for ISABELLA GARBER JR. (MRN [id number])    Ref. Range 11/12/2021 10:45   Immunoglobulin A Latest Ref Range: 68 - 408 mg/dL 70   Immunoglobulin G Latest Ref Range: 768 - 1632 mg/dL 745 (L)   Immunoglobulin M Latest Ref Range: 35 - 263 mg/dL 980 (H)   Results for ISABELLA GARBER JR. (MRN [id number])    Ref. Range 2/3/2022 10:40   Viscosity Latest Ref Range: 1.10 - 1.80 cP 1.37     Assessment    Imp:    Visit Diagnosis:    1. Waldenstrom macroglobulinemia (HCC)  CBC WITH DIFFERENTIAL    Comp Metabolic Panel    LDH    Monoclonal Protein and FLC, Serum         Plan:  3 months    Carson Pineda M.D.

## 2022-02-15 ENCOUNTER — OFFICE VISIT (OUTPATIENT)
Dept: HEMATOLOGY ONCOLOGY | Facility: MEDICAL CENTER | Age: 77
End: 2022-02-15
Payer: MEDICARE

## 2022-02-15 VITALS
RESPIRATION RATE: 16 BRPM | BODY MASS INDEX: 20.5 KG/M2 | SYSTOLIC BLOOD PRESSURE: 112 MMHG | DIASTOLIC BLOOD PRESSURE: 62 MMHG | OXYGEN SATURATION: 98 % | HEART RATE: 60 BPM | WEIGHT: 143.19 LBS | TEMPERATURE: 98.6 F | HEIGHT: 70 IN

## 2022-02-15 DIAGNOSIS — C88.0 WALDENSTROM MACROGLOBULINEMIA (HCC): ICD-10-CM

## 2022-02-15 PROCEDURE — 99214 OFFICE O/P EST MOD 30 MIN: CPT | Performed by: INTERNAL MEDICINE

## 2022-02-15 ASSESSMENT — FIBROSIS 4 INDEX: FIB4 SCORE: 1.25

## 2022-02-15 ASSESSMENT — PATIENT HEALTH QUESTIONNAIRE - PHQ9: CLINICAL INTERPRETATION OF PHQ2 SCORE: 0

## 2022-03-07 ENCOUNTER — OFFICE VISIT (OUTPATIENT)
Dept: MEDICAL GROUP | Facility: IMAGING CENTER | Age: 77
End: 2022-03-07
Payer: MEDICARE

## 2022-03-07 VITALS
HEIGHT: 70 IN | BODY MASS INDEX: 19.76 KG/M2 | TEMPERATURE: 98.4 F | HEART RATE: 70 BPM | WEIGHT: 138 LBS | SYSTOLIC BLOOD PRESSURE: 138 MMHG | OXYGEN SATURATION: 98 % | DIASTOLIC BLOOD PRESSURE: 62 MMHG

## 2022-03-07 DIAGNOSIS — B07.0 PLANTAR WART OF LEFT FOOT: ICD-10-CM

## 2022-03-07 DIAGNOSIS — Z12.11 COLON CANCER SCREENING: ICD-10-CM

## 2022-03-07 DIAGNOSIS — C88.0 WALDENSTROM MACROGLOBULINEMIA (HCC): ICD-10-CM

## 2022-03-07 PROBLEM — L98.9 FOOT LESION: Status: ACTIVE | Noted: 2022-03-07

## 2022-03-07 PROBLEM — C88.00 WALDENSTROM MACROGLOBULINEMIA: Status: ACTIVE | Noted: 2021-11-29

## 2022-03-07 PROCEDURE — 17110 DESTRUCTION B9 LES UP TO 14: CPT | Performed by: PHYSICIAN ASSISTANT

## 2022-03-07 PROCEDURE — 99212 OFFICE O/P EST SF 10 MIN: CPT | Mod: 25 | Performed by: PHYSICIAN ASSISTANT

## 2022-03-07 ASSESSMENT — FIBROSIS 4 INDEX: FIB4 SCORE: 1.25

## 2022-03-07 ASSESSMENT — PAIN SCALES - GENERAL: PAINLEVEL: NO PAIN

## 2022-03-07 NOTE — PATIENT INSTRUCTIONS
Plantar Warts  Warts are small growths on the skin. When they occur on the underside (sole) of the foot, they are called plantar warts. Plantar warts often occur in groups, with several small warts around a larger wart. They tend to develop on the heel or the ball of the foot. They may grow into the deeper layers of skin or rise above the surface of the skin.  Most warts are not painful, and they usually do not cause problems. However, plantar warts may cause pain when you walk because pressure is applied to them. Plantar warts may spread to other areas of the sole. They can also spread to other areas of the body through direct and indirect contact. Warts often go away on their own in time. Various treatments may be done if needed or desired.  What are the causes?  Plantar warts are caused by a type of virus that is called human papillomavirus (HPV).  · Walking barefoot can cause exposure to the virus, especially if your feet are wet.  · HPV attacks a break in the skin of the foot.  What increases the risk?  You are more likely to develop this condition if you:  · Are between 10-20 years of age.  · Use public showers or locker rooms.  · Have a weakened body defense system (immune system).  What are the signs or symptoms?  Common symptoms of this condition include:  · Flat or slightly raised growths that have a rough surface and look similar to a callus.  · Pain when you use your foot to support your body weight.  How is this diagnosed?  A plantar wart can usually be diagnosed from its appearance. In some cases, a tissue sample may be removed (biopsy) to be looked at under a microscope.  How is this treated?  In many cases, warts do not need treatment. Without treatment, they often go away with time. If treatment is needed or desired, options may include:  · Applying medicated solutions, creams, or patches to the wart. These may be over-the-counter or prescription medicines that make the skin soft so that layers will  gradually shed away. In many cases, the medicine is applied one or two times a day and covered with a bandage.  · Freezing the wart with liquid nitrogen (cryotherapy).  · Burning the wart with:  ? Laser treatment.  ? An electrified probe (electrocautery).  · Injecting a medicine (Candida antigen) into the wart to help the body's immune system fight off the wart.  · Having surgery to remove the wart.  · Putting duct tape over the top of the wart (occlusion). You will leave the tape in place for as long as told by your health care provider, and then you will replace it with a new strip of tape. This is done until the wart goes away.  Repeat treatment may be needed if you choose to remove warts. Warts sometimes go away and come back again.  Follow these instructions at home:  · Apply medicated creams or solutions only as told by your health care provider. This may involve:  ? Soaking the affected area in warm water.  ? Removing the top layer of softened skin before you apply the medicine. A pumice stone works well for removing the skin.  ? Applying a bandage over the affected area after you apply the medicine.  ? Repeating the process daily or as told by your health care provider.  · Do not scratch or pick at a wart.  · Wash your hands after you touch a wart.  · If a wart is painful, try covering it with a bandage that has a hole in the middle. This helps to take pressure off the wart.  · Keep all follow-up visits as told by your health care provider. This is important.  How is this prevented?  Take these actions to help prevent warts:  · Wear shoes and socks. Change your socks daily.  · Keep your feet clean and dry.  · Do not walk barefoot in shared locker rooms, shower areas, or swimming pools.  · Check your feet regularly.  · Avoid direct contact with warts on other people.  Contact a health care provider if:  · Your warts do not improve after treatment.  · You have redness, swelling, or pain at the site of a  wart.  · You have bleeding from a wart that does not stop with light pressure.  · You have diabetes and you develop a wart.  Summary  · Warts are small growths on the skin. When they occur on the underside (sole) of the foot, they are called plantar warts.  · In many cases, warts do not need treatment. Without treatment, they often go away with time.  · Apply medicated creams or solutions only as told by your health care provider.  · Do not scratch or pick at a wart. Wash your hands after you touch a wart.  · Keep all follow-up visits as told by your health care provider. This is important.  This information is not intended to replace advice given to you by your health care provider. Make sure you discuss any questions you have with your health care provider.  Document Released: 03/09/2005 Document Revised: 07/16/2019 Document Reviewed: 07/16/2019  [employer] Patient Education © 2020 [employer]

## 2022-03-07 NOTE — PROGRESS NOTES
Subjective:     CC:   Chief Complaint   Patient presents with   • Foot Problem     Callous left foot on side    • Referral Needed     GI referral for colonoscopy        HPI:   Chirag presents today to discuss:    Foot lesion  Patient has a lesion on the left lateral foot for the past 4 months.  He states that he thinks it is a callus.  Causing pain when he steps.    Waldenstrom macroglobulinemia (HCC)  Patient status post bone marrow biopsy consistent with Waldenström's macroglobulinemia.  Managed by Dr. Pineda.    Patient is requesting new referral for colonoscopy as he had to cancel his last one.    Past Medical History:   Diagnosis Date   • Cataract     alonso iol   • DDD (degenerative disc disease), cervical    • DDD (degenerative disc disease), cervical    • DDD (degenerative disc disease), lumbar    • Heart murmur    • Hiatus hernia syndrome    • Lumbar spinal stenosis    • Vitamin D deficiency 2/2/2018     Social History     Tobacco Use   • Smoking status: Never Smoker   • Smokeless tobacco: Never Used   Vaping Use   • Vaping Use: Never used   Substance Use Topics   • Alcohol use: Yes     Alcohol/week: 0.6 oz     Types: 1 Glasses of wine per week     Comment: 4-5/week; beer once in a while. 1/18/22: 1 drink per day.   • Drug use: No     Current Outpatient Medications Ordered in Epic   Medication Sig Dispense Refill   • levothyroxine (SYNTHROID) 50 MCG Tab Take 1 Tablet by mouth every morning on an empty stomach. 90 Tablet 1   • B Complex Vitamins (B COMPLEX-B12 PO) Take  by mouth every day.     • VITAMIN A PO Take  by mouth every day.     • Cholecalciferol (VITAMIN D3 PO) Take  by mouth every day.       No current [facility]-ordered facility-administered medications on file.     Patient has no known allergies.    Health Maintenance: Refusing pneumonia shot today.    ROS: see hpi  Gen: no fevers/chills  Skin: no rash    Objective:   Exam:  /62 (BP Location: Left arm, Patient Position: Sitting, BP Cuff Size: Adult)  "Comment: not verify ES  Pulse 70   Temp 36.9 °C (98.4 °F) (Temporal)   Ht 1.778 m (5' 10\") Comment: pt reported ES  Wt 62.6 kg (138 lb)   SpO2 98%   BMI 19.80 kg/m²    Body mass index is 19.8 kg/m².    Gen: Alert and oriented, No apparent distress.  HEENT: Head atraumatic, normocephalic. Pupils equal and round.  Lungs: Normal effort, CTA bilaterally, no wheezes, rhonchi, or rales  CV: Regular rate and rhythm. No murmurs, rubs, or gallops.  Ext: No clubbing, cyanosis, edema.  Left lateral foot with 1 x 1 cm plantars wart.    Lesn Destn - Benign    Date/Time: 3/7/2022 2:21 PM  Performed by: Etta Joe P.A.-C.  Authorized by: Etta Joe P.A.-C.     Number of Lesions: 1  Lesion 1:     Body area: lower extremity    Lower extremity location: L foot    Initial size (mm): 10    Destruction method: cryotherapy        Assessment & Plan:     76 y.o. male with the following -     1. Plantar wart of left foot  Cryo freeze performed today.  Antibiotic ointment and dressing applied post procedure.  Discussed possibility of irritation surrounding skin.  May repeat in 2 weeks if necessary.  - Lesn Tesfayen - Benign    2. Waldenstrom macroglobulinemia (HCC)  Chronic, managed by hematology.  Follow-up as scheduled.    3. Colon cancer screening  - Referral to GI for Colonoscopy    Return for As needed.    Etta Joe PA-C (Baker)  Physician Assistant Certified  [facility]    Please note that this dictation was created using voice recognition software. I have made every reasonable attempt to correct obvious errors, but I expect that there are errors of grammar and possibly content that I did not discover before finalizing the note.  "

## 2022-04-04 ENCOUNTER — OFFICE VISIT (OUTPATIENT)
Dept: MEDICAL GROUP | Facility: IMAGING CENTER | Age: 77
End: 2022-04-04
Payer: MEDICARE

## 2022-04-04 VITALS
OXYGEN SATURATION: 98 % | BODY MASS INDEX: 20.47 KG/M2 | DIASTOLIC BLOOD PRESSURE: 70 MMHG | TEMPERATURE: 98.1 F | HEIGHT: 70 IN | HEART RATE: 77 BPM | SYSTOLIC BLOOD PRESSURE: 132 MMHG | WEIGHT: 143 LBS

## 2022-04-04 DIAGNOSIS — D22.9 NUMEROUS MOLES: ICD-10-CM

## 2022-04-04 DIAGNOSIS — M48.061 SPINAL STENOSIS OF LUMBAR REGION, UNSPECIFIED WHETHER NEUROGENIC CLAUDICATION PRESENT: Chronic | ICD-10-CM

## 2022-04-04 DIAGNOSIS — Z91.89 10 YEAR RISK OF MI OR STROKE 7.5% OR GREATER: ICD-10-CM

## 2022-04-04 DIAGNOSIS — Z91.89 OTHER SPECIFIED PERSONAL RISK FACTORS, NOT ELSEWHERE CLASSIFIED: ICD-10-CM

## 2022-04-04 DIAGNOSIS — Z23 NEED FOR VACCINATION: ICD-10-CM

## 2022-04-04 DIAGNOSIS — L98.9 FACIAL LESION: ICD-10-CM

## 2022-04-04 DIAGNOSIS — C88.0 WALDENSTROM MACROGLOBULINEMIA (HCC): ICD-10-CM

## 2022-04-04 PROBLEM — B07.0 PLANTAR WART: Status: ACTIVE | Noted: 2022-03-07

## 2022-04-04 PROCEDURE — 99214 OFFICE O/P EST MOD 30 MIN: CPT | Mod: 25 | Performed by: PHYSICIAN ASSISTANT

## 2022-04-04 PROCEDURE — 90732 PPSV23 VACC 2 YRS+ SUBQ/IM: CPT | Performed by: PHYSICIAN ASSISTANT

## 2022-04-04 PROCEDURE — G0009 ADMIN PNEUMOCOCCAL VACCINE: HCPCS | Performed by: PHYSICIAN ASSISTANT

## 2022-04-04 ASSESSMENT — PAIN SCALES - GENERAL: PAINLEVEL: NO PAIN

## 2022-04-04 ASSESSMENT — FIBROSIS 4 INDEX: FIB4 SCORE: 1.25

## 2022-04-04 NOTE — PROGRESS NOTES
Subjective:     CC:   Chief Complaint   Patient presents with   • Referral Needed     Hematologist appt may,   • Spots and/or Floaters     Spots on face   • Paperwork     Airline to get on plane sooner - handicap pass       HPI:   Chirag presents today to discuss:    Lumbar spinal stenosis  Patient with chronic lower back pain with intermittent leg weakness.  He has done physical therapy in the past with improvement.  He is requesting a new referral.  He is also requesting a note for airline travel for prior to boarding due to his back.    Waldenstrom macroglobulinemia (HCC)  Chronic, managed by hematology.  Patient states that he has an appointment coming up with Dr. Sanchez.    Numerous moles  Patient with numerous moles and skin lesions.  He is requesting referral to dermatology.    10 year risk of MI or stroke 7.5% or greater  The 10-year ASCVD risk score (Kala GERARDO Jr., et al., 2013) is: 22.1%    Values used to calculate the score:      Age: 76 years      Sex: Male      Is Non- : No      Diabetic: No      Tobacco smoker: No      Systolic Blood Pressure: 132 mmHg      Is BP treated: No      HDL Cholesterol: 71 mg/dL      Total Cholesterol: 169 mg/dL    Patient not interested in statin medications at this time but would be interested in calcium scoring.      Past Medical History:   Diagnosis Date   • Cataract     alonso iol   • DDD (degenerative disc disease), cervical    • DDD (degenerative disc disease), cervical    • DDD (degenerative disc disease), lumbar    • Heart murmur    • Hiatus hernia syndrome    • Lumbar spinal stenosis    • Vitamin D deficiency 2/2/2018     Family History   Problem Relation Age of Onset   • Cancer Mother 50        breast   • Cancer Paternal Grandmother 70        colon cancer     Past Surgical History:   Procedure Laterality Date   • [place] DX BONE MARROW ASPIRATIONS Left 1/21/2022    Procedure: ASPIRATION, BONE MARROW - DR SANCHEZ;  Surgeon: Tyrel Mcintyre M.D.;  Location:  "SURGERY SAME DAY [facility];  Service: Orthopedics   • [place] DX BONE MARROW BIOPSIES Left 1/21/2022    Procedure: BIOPSY, BONE MARROW, USING NEEDLE OR TROCAR;  Surgeon: Tyrel Mcintyre M.D.;  Location: SURGERY SAME DAY [facility];  Service: Orthopedics   • INGUINAL HERNIA REPAIR ROBOTIC Left 4/8/2016    Procedure: INGUINAL HERNIA REPAIR ROBOTIC WITH MESH;  Surgeon: Dustin Maria M.D.;  Location: SURGERY SAME DAY [facility];  Service:    • OTHER  2006    alonso iol   • CATARACT EXTRACTION WITH IOL Bilateral    • HERNIA REPAIR      bilat     Social History     Tobacco Use   • Smoking status: Never Smoker   • Smokeless tobacco: Never Used   Vaping Use   • Vaping Use: Never used   Substance Use Topics   • Alcohol use: Yes     Alcohol/week: 0.6 oz     Types: 1 Glasses of wine per week     Comment: 4-5/week; beer once in a while. 1/18/22: 1 drink per day.   • Drug use: No     Social History     Social History Narrative   • Not on file     Current Outpatient Medications Ordered in Epic   Medication Sig Dispense Refill   • levothyroxine (SYNTHROID) 50 MCG Tab Take 1 Tablet by mouth every morning on an empty stomach. 90 Tablet 1   • B Complex Vitamins (B COMPLEX-B12 PO) Take  by mouth every day.     • VITAMIN A PO Take  by mouth every day.     • Cholecalciferol (VITAMIN D3 PO) Take  by mouth every day.       No current [facility]-ordered facility-administered medications on file.     Patient has no known allergies.    Health Maintenance: Completed.  Pneumovax today.    ROS: see hpi  Gen: no fevers/chills  Pulm: no sob, no cough  CV: no chest pain, no palpitations, no edema  GI: no nausea/vomiting, no diarrhea  Skin: no rash    Objective:   Exam:  /70 (BP Location: Left arm, Patient Position: Sitting, BP Cuff Size: Adult)   Pulse 77   Temp 36.7 °C (98.1 °F) (Temporal)   Ht 1.778 m (5' 10\")   Wt 64.9 kg (143 lb)   SpO2 98%   BMI 20.52 kg/m²    Body mass index is 20.52 kg/m².    Gen: Alert and oriented, No apparent distress.  " Numerous moles and skin lesions noted on face, neck, arms, trunk.  HEENT: Head atraumatic, normocephalic. Pupils equal and round.  Neck: Neck is supple without lymphadenopathy.   Lungs: Normal effort, CTA bilaterally, no wheezes, rhonchi, or rales  CV: Regular rate and rhythm. No murmurs, rubs, or gallops.  Ext: No clubbing, cyanosis, edema.    Assessment & Plan:     76 y.o. male with the following -     1. Spinal stenosis of lumbar region, unspecified whether neurogenic claudication present  Chronic, intermittent flares.  Refer to PT.  Note for airlines given to patient.  - Referral to Physical Therapy    2. Waldenstrom macroglobulinemia (HCC)  Chronic, managed by hematology.  Follow-up as scheduled.    3. Facial lesion  - Referral to Dermatology    4. Numerous moles  - Referral to Dermatology    5. Other specified personal risk factors, not elsewhere classified  - CT-HEART W/O CONT EVAL CALCIUM; Future    6. 10 year risk of MI or stroke 7.5% or greater  If calcium score abnormal will rediscuss statin therapy.  - CT-HEART W/O CONT EVAL CALCIUM; Future    7. Need for vaccination  - Pneumovax Vaccine (PPSV23)    Return in about 7 months (around 11/4/2022) for Annual wellness exam.    Etta Joe PA-C (Baker)  Physician Assistant Certified  [facility]    Please note that this dictation was created using voice recognition software. I have made every reasonable attempt to correct obvious errors, but I expect that there are errors of grammar and possibly content that I did not discover before finalizing the note.

## 2022-04-04 NOTE — ASSESSMENT & PLAN NOTE
The 10-year ASCVD risk score (Kala CARRILLO Jr., et al., 2013) is: 22.1%    Values used to calculate the score:      Age: 76 years      Sex: Male      Is Non- : No      Diabetic: No      Tobacco smoker: No      Systolic Blood Pressure: 132 mmHg      Is BP treated: No      HDL Cholesterol: 71 mg/dL      Total Cholesterol: 169 mg/dL    Patient not interested in statin medications at this time but would be interested in calcium scoring.

## 2022-04-04 NOTE — LETTER
2022      To Whom It May Concern:      Chirag Damon Jr. ( 47) is a patient under my care and due to his current medical condition I would recommend priority airline boarding.      Sincerely,      Etta Joe PA-C (Baker)  Physician Assistant Certified  [facility]                                  Etta Joe P.A.-C.

## 2022-04-04 NOTE — PROGRESS NOTES
Annual Health Assessment Questions:    1.  Are you currently engaging in any exercise or physical activity? Yes    2.  How would you describe your mood or emotional well-being today? good    3.  Have you had any falls in the last year? No    4.  Have you noticed any problems with your balance or had difficulty walking? No    5.  In the last six months have you experienced any leakage of urine? No    6. DPA/Advanced Directive: Patient does not have advanced directive and not interested in packet.

## 2022-04-04 NOTE — ASSESSMENT & PLAN NOTE
Patient with chronic lower back pain with intermittent leg weakness.  He has done physical therapy in the past with improvement.  He is requesting a new referral.  He is also requesting a note for airline travel for prior to boarding due to his back.

## 2022-04-04 NOTE — ASSESSMENT & PLAN NOTE
Chronic, managed by hematology.  Patient states that he has an appointment coming up with Dr. Pineda.

## 2022-04-05 ENCOUNTER — APPOINTMENT (OUTPATIENT)
Dept: MEDICAL GROUP | Facility: IMAGING CENTER | Age: 77
End: 2022-04-05
Payer: MEDICARE

## 2022-05-06 ENCOUNTER — HOSPITAL ENCOUNTER (OUTPATIENT)
Dept: LAB | Facility: MEDICAL CENTER | Age: 77
End: 2022-05-06
Attending: INTERNAL MEDICINE
Payer: MEDICARE

## 2022-05-06 DIAGNOSIS — C88.0 WALDENSTROM MACROGLOBULINEMIA (HCC): ICD-10-CM

## 2022-05-06 LAB
ALBUMIN SERPL BCP-MCNC: 4.6 G/DL (ref 3.2–4.9)
ALBUMIN/GLOB SERPL: 1.7 G/DL
ALP SERPL-CCNC: 65 U/L (ref 30–99)
ALT SERPL-CCNC: 18 U/L (ref 2–50)
ANION GAP SERPL CALC-SCNC: 14 MMOL/L (ref 7–16)
AST SERPL-CCNC: 26 U/L (ref 12–45)
BASOPHILS # BLD AUTO: 0.6 % (ref 0–1.8)
BASOPHILS # BLD: 0.04 K/UL (ref 0–0.12)
BILIRUB SERPL-MCNC: 0.4 MG/DL (ref 0.1–1.5)
BUN SERPL-MCNC: 11 MG/DL (ref 8–22)
CALCIUM SERPL-MCNC: 9.2 MG/DL (ref 8.5–10.5)
CHLORIDE SERPL-SCNC: 96 MMOL/L (ref 96–112)
CO2 SERPL-SCNC: 21 MMOL/L (ref 20–33)
CREAT SERPL-MCNC: 0.76 MG/DL (ref 0.5–1.4)
EOSINOPHIL # BLD AUTO: 0.12 K/UL (ref 0–0.51)
EOSINOPHIL NFR BLD: 1.7 % (ref 0–6.9)
ERYTHROCYTE [DISTWIDTH] IN BLOOD BY AUTOMATED COUNT: 43.4 FL (ref 35.9–50)
GFR SERPLBLD CREATININE-BSD FMLA CKD-EPI: 93 ML/MIN/1.73 M 2
GLOBULIN SER CALC-MCNC: 2.7 G/DL (ref 1.9–3.5)
GLUCOSE SERPL-MCNC: 141 MG/DL (ref 65–99)
HCT VFR BLD AUTO: 39.3 % (ref 42–52)
HGB BLD-MCNC: 14.4 G/DL (ref 14–18)
IMM GRANULOCYTES # BLD AUTO: 0.02 K/UL (ref 0–0.11)
IMM GRANULOCYTES NFR BLD AUTO: 0.3 % (ref 0–0.9)
LDH SERPL L TO P-CCNC: 170 U/L (ref 107–266)
LYMPHOCYTES # BLD AUTO: 2.17 K/UL (ref 1–4.8)
LYMPHOCYTES NFR BLD: 30.6 % (ref 22–41)
MCH RBC QN AUTO: 33.7 PG (ref 27–33)
MCHC RBC AUTO-ENTMCNC: 36.6 G/DL (ref 33.7–35.3)
MCV RBC AUTO: 92 FL (ref 81.4–97.8)
MONOCYTES # BLD AUTO: 0.72 K/UL (ref 0–0.85)
MONOCYTES NFR BLD AUTO: 10.1 % (ref 0–13.4)
NEUTROPHILS # BLD AUTO: 4.03 K/UL (ref 1.82–7.42)
NEUTROPHILS NFR BLD: 56.7 % (ref 44–72)
NRBC # BLD AUTO: 0 K/UL
NRBC BLD-RTO: 0 /100 WBC
PLATELET # BLD AUTO: 284 K/UL (ref 164–446)
PMV BLD AUTO: 8.6 FL (ref 9–12.9)
POTASSIUM SERPL-SCNC: 4.1 MMOL/L (ref 3.6–5.5)
PROT SERPL-MCNC: 7.3 G/DL (ref 6–8.2)
RBC # BLD AUTO: 4.27 M/UL (ref 4.7–6.1)
SODIUM SERPL-SCNC: 131 MMOL/L (ref 135–145)
WBC # BLD AUTO: 7.1 K/UL (ref 4.8–10.8)

## 2022-05-06 PROCEDURE — 84165 PROTEIN E-PHORESIS SERUM: CPT

## 2022-05-06 PROCEDURE — 82784 ASSAY IGA/IGD/IGG/IGM EACH: CPT

## 2022-05-06 PROCEDURE — 86334 IMMUNOFIX E-PHORESIS SERUM: CPT

## 2022-05-06 PROCEDURE — 80053 COMPREHEN METABOLIC PANEL: CPT

## 2022-05-06 PROCEDURE — 83615 LACTATE (LD) (LDH) ENZYME: CPT

## 2022-05-06 PROCEDURE — 36415 COLL VENOUS BLD VENIPUNCTURE: CPT

## 2022-05-06 PROCEDURE — 85025 COMPLETE CBC W/AUTO DIFF WBC: CPT

## 2022-05-06 PROCEDURE — 84155 ASSAY OF PROTEIN SERUM: CPT | Mod: XU

## 2022-05-06 PROCEDURE — 83521 IG LIGHT CHAINS FREE EACH: CPT

## 2022-05-11 LAB
ALBUMIN SERPL ELPH-MCNC: 4.09 G/DL (ref 3.75–5.01)
ALPHA1 GLOB SERPL ELPH-MCNC: 0.35 G/DL (ref 0.19–0.46)
ALPHA2 GLOB SERPL ELPH-MCNC: 0.92 G/DL (ref 0.48–1.05)
B-GLOBULIN SERPL ELPH-MCNC: 1.01 G/DL (ref 0.48–1.1)
EER MONOCLONAL PROTEIN AND FLC, SERUM Q5224: ABNORMAL
GAMMA GLOB SERPL ELPH-MCNC: 1.13 G/DL (ref 0.62–1.51)
IGA SERPL-MCNC: 75 MG/DL (ref 68–408)
IGG SERPL-MCNC: 771 MG/DL (ref 768–1632)
IGM SERPL-MCNC: 1040 MG/DL (ref 35–263)
INTERPRETATION SERPL IFE-IMP: ABNORMAL
INTERPRETATION SERPL IFE-IMP: ABNORMAL
KAPPA LC FREE SER-MCNC: 31.93 MG/L (ref 3.3–19.4)
KAPPA LC FREE/LAMBDA FREE SER NEPH: 2.98 {RATIO} (ref 0.26–1.65)
LAMBDA LC FREE SERPL-MCNC: 10.71 MG/L (ref 5.71–26.3)
PROT SERPL-MCNC: 7.5 G/DL (ref 6.3–8.2)

## 2022-05-24 ENCOUNTER — PATIENT MESSAGE (OUTPATIENT)
Dept: HEALTH INFORMATION MANAGEMENT | Facility: OTHER | Age: 77
End: 2022-05-24

## 2022-05-25 NOTE — PROGRESS NOTES
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 No CRAB criteria are abnormal. Just back from sailing in [facility].    ROS:    Constitutional: No weight loss  Skin: No rash or jaundice  HENT: No change in eyesight or hearing  Cardiovascular:No chest pain or arrythmia  Respiratory:No cough or SOB  GI:No nausea, vomiting, diarrhea, constipation  :No dysuria or frequency  Musculoskeletal:No bone or joint pain  Neuro:No sx's of neuropathy  Psych: No complaints    PMH:      No Known Allergies    Past Medical History:   Diagnosis Date   • Cataract     alonso iol   • DDD (degenerative disc disease), cervical    • DDD (degenerative disc disease), cervical    • DDD (degenerative disc disease), lumbar    • Heart murmur    • Hiatus hernia syndrome    • Lumbar spinal stenosis    • Vitamin D deficiency 2/2/2018        Past Surgical History:   Procedure Laterality Date   • [place] DX BONE MARROW ASPIRATIONS Left 1/21/2022    Procedure: ASPIRATION, BONE MARROW - DR SANCHEZ;  Surgeon: Tyrel Mcintyre M.D.;  Location: SURGERY SAME DAY [facility];  Service: Orthopedics   • VT DX BONE MARROW BIOPSIES Left 1/21/2022    Procedure: BIOPSY, BONE MARROW, USING NEEDLE OR TROCAR;  Surgeon: Tyrel Mcintyre M.D.;  Location: SURGERY SAME DAY [facility];  Service: Orthopedics   • INGUINAL HERNIA REPAIR ROBOTIC Left 4/8/2016    Procedure: INGUINAL HERNIA REPAIR ROBOTIC WITH MESH;  Surgeon: Dustin Maria M.D.;  Location: SURGERY SAME DAY [facility];  Service:    • OTHER  2006    alonso iol   • CATARACT EXTRACTION WITH IOL Bilateral    • HERNIA REPAIR      bilat        Medications:    Current Outpatient Medications on File Prior to Visit   Medication Sig Dispense Refill   • levothyroxine (SYNTHROID) 50 MCG Tab Take 1 Tablet by mouth every morning on an empty stomach. 90 Tablet 1   • B Complex  "Vitamins (B COMPLEX-B12 PO) Take  by mouth every day.     • VITAMIN A PO Take  by mouth every day.     • Cholecalciferol (VITAMIN D3 PO) Take  by mouth every day.       No current facility-administered medications on file prior to visit.       Social History     Tobacco Use   • Smoking status: Never Smoker   • Smokeless tobacco: Never Used   Substance Use Topics   • Alcohol use: Yes     Alcohol/week: 0.6 oz     Types: 1 Glasses of wine per week     Comment: 4-5/week; beer once in a while. 1/18/22: 1 drink per day.        Family History   Problem Relation Age of Onset   • Cancer Mother 50        breast   • Cancer Paternal Grandmother 70        colon cancer        Objective    Vitals:    /80 (BP Location: Left arm, Patient Position: Sitting, BP Cuff Size: Adult)   Pulse 64   Temp 36.3 °C (97.4 °F) (Temporal)   Resp 16   Ht 1.778 m (5' 10\")   Wt 64.4 kg (142 lb 1.4 oz)   SpO2 96%   BMI 20.39 kg/m²     Physical Exam:    Appears well-developed and well-nourished. No distress.    Head -  Normocephalic .   Eyes - Pupils are equal. Conjunctivae normal. No scleral icterus.   Ears - normal hearing  Neck - Normal range of motion. Neck supple. No thyromegaly  Cardiovascular - Normal rate, regular rhythm, normal heart sounds and intact distal pulses. No  gallop, murmur or rub  Pulmonary - Normal breath sounds.  No wheeze, rales or rhonci  Abdominal -Soft. No distension, tenderness, organomegaly or mass  Extremities-  No edema or tenderness.    Nodes - No submental, submandibular, preauricular, cervical, axillary or inguinal adenopathy.    Neurological -   Alert and oriented.  Skin - Skin is warm and dry and very tan.No rash noted. Not diaphoretic. No erythema. No pallor. No jaundice   Psychiatric -  Normal mood and affect.    Labs:     Latest Reference Range & Units 01/21/22 08:55 05/06/22 13:51   WBC 4.8 - 10.8 K/uL 4.5 (L) 7.1   RBC 4.70 - 6.10 M/uL 4.12 (L) 4.27 (L)   Hemoglobin 14.0 - 18.0 g/dL 13.8 (L) 14.4 "   Hematocrit 42.0 - 52.0 % 40.1 (L) 39.3 (L)   MCV 81.4 - 97.8 fL 97.3 92.0   MCH 27.0 - 33.0 pg 33.5 (H) 33.7 (H)   MCHC 33.7 - 35.3 g/dL 34.4 36.6 (H)   RDW 35.9 - 50.0 fL 45.8 43.4   Platelet Count 164 - 446 K/uL 254 284      Latest Reference Range & Units 05/06/22 13:51   Sodium 135 - 145 mmol/L 131 (L)   Potassium 3.6 - 5.5 mmol/L 4.1   Chloride 96 - 112 mmol/L 96   Co2 20 - 33 mmol/L 21   Anion Gap 7.0 - 16.0  14.0   Glucose 65 - 99 mg/dL 141 (H)   Bun 8 - 22 mg/dL 11   Creatinine 0.50 - 1.40 mg/dL 0.76   GFR (CKD-EPI) >60 mL/min/1.73 m 2 93 [1]   Calcium 8.5 - 10.5 mg/dL 9.2   AST(SGOT) 12 - 45 U/L 26   ALT(SGPT) 2 - 50 U/L 18   Alkaline Phosphatase 30 - 99 U/L 65   Total Bilirubin 0.1 - 1.5 mg/dL 0.4   Albumin 3.2 - 4.9 g/dL 4.6   Total Protein 6.0 - 8.2 g/dL 7.3   Globulin 1.9 - 3.5 g/dL 2.7   A-G Ratio g/dL 1.7   LDH Total 107 - 266 U/L 170      Latest Reference Range & Units 11/12/21 10:45 05/06/22 13:51   Immunoglobulin A 68 - 408 mg/dL 70 [1] 75 [2]   Immunoglobulin G 768 - 1632 mg/dL 745 (L) [3] 771 [4]   Immunoglobulin M 35 - 263 mg/dL 980 (H) [5] 1040 (H) [6]   .    Latest Reference Range & Units 02/03/22 10:40   Viscosity 1.10 - 1.80 cP 1.37 [1]   Assessment    Imp:    Visit Diagnosis:    1. Waldenstrom macroglobulinemia (HCC)         Plan:  RTC 3 months - same lab    Carson Pineda M.D.

## 2022-06-01 ENCOUNTER — OFFICE VISIT (OUTPATIENT)
Dept: HEMATOLOGY ONCOLOGY | Facility: MEDICAL CENTER | Age: 77
End: 2022-06-01
Payer: MEDICARE

## 2022-06-01 VITALS
OXYGEN SATURATION: 96 % | HEART RATE: 64 BPM | TEMPERATURE: 97.4 F | DIASTOLIC BLOOD PRESSURE: 80 MMHG | SYSTOLIC BLOOD PRESSURE: 130 MMHG | WEIGHT: 142.09 LBS | HEIGHT: 70 IN | RESPIRATION RATE: 16 BRPM | BODY MASS INDEX: 20.34 KG/M2

## 2022-06-01 DIAGNOSIS — C88.0 WALDENSTROM MACROGLOBULINEMIA (HCC): ICD-10-CM

## 2022-06-01 PROCEDURE — 99214 OFFICE O/P EST MOD 30 MIN: CPT | Performed by: INTERNAL MEDICINE

## 2022-06-01 ASSESSMENT — FIBROSIS 4 INDEX: FIB4 SCORE: 1.62

## 2022-06-01 ASSESSMENT — PAIN SCALES - GENERAL: PAINLEVEL: NO PAIN

## 2022-06-08 ENCOUNTER — TELEPHONE (OUTPATIENT)
Dept: MEDICAL GROUP | Facility: IMAGING CENTER | Age: 77
End: 2022-06-08
Payer: MEDICARE

## 2022-06-08 NOTE — TELEPHONE ENCOUNTER
Received message from patient requesting refill on medication. Patient requested call back.    Called and lm for patient.

## 2022-06-10 ENCOUNTER — APPOINTMENT (OUTPATIENT)
Dept: MEDICAL GROUP | Facility: IMAGING CENTER | Age: 77
End: 2022-06-10
Payer: MEDICARE

## 2022-06-10 DIAGNOSIS — E03.9 HYPOTHYROIDISM, UNSPECIFIED TYPE: ICD-10-CM

## 2022-06-10 RX ORDER — LEVOTHYROXINE SODIUM 0.05 MG/1
50 TABLET ORAL
Qty: 90 TABLET | Refills: 3 | Status: SHIPPED | OUTPATIENT
Start: 2022-06-10 | End: 2022-08-30 | Stop reason: SDUPTHER

## 2022-06-14 ENCOUNTER — APPOINTMENT (OUTPATIENT)
Dept: MEDICAL GROUP | Facility: IMAGING CENTER | Age: 77
End: 2022-06-14
Payer: MEDICARE

## 2022-08-04 ENCOUNTER — OFFICE VISIT (OUTPATIENT)
Dept: MEDICAL GROUP | Facility: IMAGING CENTER | Age: 77
End: 2022-08-04
Payer: MEDICARE

## 2022-08-04 ENCOUNTER — TELEPHONE (OUTPATIENT)
Dept: HEALTH INFORMATION MANAGEMENT | Facility: OTHER | Age: 77
End: 2022-08-04

## 2022-08-04 VITALS
DIASTOLIC BLOOD PRESSURE: 58 MMHG | BODY MASS INDEX: 19.84 KG/M2 | WEIGHT: 138.6 LBS | TEMPERATURE: 97.8 F | RESPIRATION RATE: 14 BRPM | SYSTOLIC BLOOD PRESSURE: 104 MMHG | HEART RATE: 78 BPM | OXYGEN SATURATION: 98 % | HEIGHT: 70 IN

## 2022-08-04 DIAGNOSIS — Z91.89 OTHER SPECIFIED PERSONAL RISK FACTORS, NOT ELSEWHERE CLASSIFIED: ICD-10-CM

## 2022-08-04 DIAGNOSIS — Z12.11 COLON CANCER SCREENING: ICD-10-CM

## 2022-08-04 DIAGNOSIS — C88.0 WALDENSTROM MACROGLOBULINEMIA (HCC): ICD-10-CM

## 2022-08-04 DIAGNOSIS — H04.129 DRY EYE: ICD-10-CM

## 2022-08-04 DIAGNOSIS — L82.1 SEBORRHEIC KERATOSES: ICD-10-CM

## 2022-08-04 DIAGNOSIS — Z91.89 10 YEAR RISK OF MI OR STROKE 7.5% OR GREATER: ICD-10-CM

## 2022-08-04 DIAGNOSIS — R73.02 IGT (IMPAIRED GLUCOSE TOLERANCE): ICD-10-CM

## 2022-08-04 DIAGNOSIS — E87.1 HYPONATREMIA: ICD-10-CM

## 2022-08-04 DIAGNOSIS — E03.9 HYPOTHYROIDISM, UNSPECIFIED TYPE: ICD-10-CM

## 2022-08-04 PROCEDURE — 99214 OFFICE O/P EST MOD 30 MIN: CPT | Mod: 25 | Performed by: PHYSICIAN ASSISTANT

## 2022-08-04 PROCEDURE — 17110 DESTRUCTION B9 LES UP TO 14: CPT | Performed by: PHYSICIAN ASSISTANT

## 2022-08-04 ASSESSMENT — PAIN SCALES - GENERAL: PAINLEVEL: NO PAIN

## 2022-08-04 ASSESSMENT — FIBROSIS 4 INDEX: FIB4 SCORE: 1.62

## 2022-08-04 NOTE — PATIENT INSTRUCTIONS
It was a pleasure meeting with you today at [facility]!    Your medical history/records and medications were reviewed today.     Please review my practice information below:    If you have any prescription refill requests, please send them via Evot[employer] or discuss with your provider at the start of your office visits. Please allow 3-5 business days for lab and testing review and you will be contacted via [employer]illion with those results, or if advised to make a follow up appointment regarding those results, then please do so.     Once resulted, your lab/test/imaging results will show up automatically in your MyChart. Please wait for my interpretation and recommendations prior to viewing your results to avoid any unnecessary confusion or misinterpretation. I will address all of the lab values that I interpret as abnormal and message you accordingly on your MyChart. I will always send you a message about your results even if they are normal. If you do not hear back from me within 5-7 business days after completing your tests, then please send me a message on Evotect so I can obtain your results (especially if you went to an outside lab or imaging center - [facility], [facility], etc).     If you have any additional questions or concerns beyond my interpretation of your results, please make an appointment with me to discuss in further detail.    Please only use the [employer] messaging system for questions regarding your most recent appointment or if advised to use otherwise (glucose or blood pressure reporting).     If you have any new problems or concerns, you must make an appointment to discuss. This includes any referral requests, lab requests (unless advised to notify me for pre-appt labs), medication side effects, or request for medication adjustments.     Please arrive 15 minutes prior to your appointment time to complete your check-in and intake with the medical assistant.      Thank you,    Etta Carter) Heath  DOROTEO  Physician Assistant Certified  [facility]

## 2022-08-04 NOTE — ASSESSMENT & PLAN NOTE
Patient with recurrent hyponatremia.  He does not admit to excess fluid intake.  He drinks less than 2 L a day.

## 2022-08-04 NOTE — ASSESSMENT & PLAN NOTE
The 10-year ASCVD risk score (Kala CARRILLO Jr., et al., 2013) is: 15.1%    Values used to calculate the score:      Age: 76 years      Sex: Male      Is Non- : No      Diabetic: No      Tobacco smoker: No      Systolic Blood Pressure: 104 mmHg      Is BP treated: No      HDL Cholesterol: 71 mg/dL      Total Cholesterol: 169 mg/dL

## 2022-08-04 NOTE — ASSESSMENT & PLAN NOTE
Chronic, managed by hematology.  Patient states that he has labs coming up and will be seeing Dr. Pineda next month.  No bone pain.  Occasional muscle cramping.

## 2022-08-04 NOTE — ASSESSMENT & PLAN NOTE
Patient admits to multiple seborrheic keratoses.  He did see dermatology.  He states he is interested in having some frozen off as there are several that catch on his clothes and cause irritation.

## 2022-08-04 NOTE — PROGRESS NOTES
Subjective:     CC:   Chief Complaint   Patient presents with   • Lab Results   • Nevus     On arms, requesting to be frozen off       HPI:   Chirag presents today to discuss:    10 year risk of MI or stroke 7.5% or greater  The 10-year ASCVD risk score (Kala CARRILLO Jr., et al., 2013) is: 15.1%    Values used to calculate the score:      Age: 76 years      Sex: Male      Is Non- : No      Diabetic: No      Tobacco smoker: No      Systolic Blood Pressure: 104 mmHg      Is BP treated: No      HDL Cholesterol: 71 mg/dL      Total Cholesterol: 169 mg/dL      Hypothyroidism  Chronic, controlled on Synthroid 50 MCG daily.    IGT (impaired glucose tolerance)  Chronic, 4 pound weight loss since last visit.  No major dietary changes.    Seborrheic keratoses  Patient admits to multiple seborrheic keratoses.  He did see dermatology.  He states he is interested in having some frozen off as there are several that catch on his clothes and cause irritation.    Waldenstrom macroglobulinemia (HCC)  Chronic, managed by hematology.  Patient states that he has labs coming up and will be seeing Dr. iPneda next month.  No bone pain.  Occasional muscle cramping.    Dry eye  Patient admits to occasional dry eyes.  He would like to see an ophthalmologist.    Hyponatremia  Patient with recurrent hyponatremia.  He does not admit to excess fluid intake.  He drinks less than 2 L a day.      Past Medical History:   Diagnosis Date   • Cataract     alonso iol   • DDD (degenerative disc disease), cervical    • DDD (degenerative disc disease), cervical    • DDD (degenerative disc disease), lumbar    • Heart murmur    • Hiatus hernia syndrome    • Lumbar spinal stenosis    • Vitamin D deficiency 2/2/2018     Family History   Problem Relation Age of Onset   • Cancer Mother 50        breast   • Cancer Paternal Grandmother 70        colon cancer     Past Surgical History:   Procedure Laterality Date   • [place] DX BONE MARROW ASPIRATIONS Left  "1/21/2022    Procedure: ASPIRATION, BONE MARROW - DR SANCHEZ;  Surgeon: Tyrel Mcintyre M.D.;  [facility]ERY [facility];  Service: Orthopedics   • [place] DX BONE MARROW BIOPSIES Left 1/21/2022    Procedure: BIOPSY, BONE MARROW, USING NEEDLE OR TROCAR;  Surgeon: Tyrel Mcintyre M.D.;  Location: SURGERY [facility];  Service: Orthopedics   • INGUINAL HERNIA REPAIR ROBOTIC Left 4/8/2016    Procedure: INGUINAL HERNIA REPAIR ROBOTIC WITH MESH;  Surgeon: Dustin Maria M.D.;  Location: SURGERY SAME DAY [facility];  Service:    • OTHER  2006    alonso iol   • CATARACT EXTRACTION WITH IOL Bilateral    • HERNIA REPAIR      bilat     Social History     Tobacco Use   • Smoking status: Never Smoker   • Smokeless tobacco: Never Used   Vaping Use   • Vaping Use: Never used   Substance Use Topics   • Alcohol use: Yes     Alcohol/week: 0.6 oz     Types: 1 Glasses of wine per week     Comment: 4-5/week; beer once in a while. 1/18/22: 1 drink per day.   • Drug use: No     Social History     Social History Narrative   • Not on file     Current Outpatient Medications Ordered in Epic   Medication Sig Dispense Refill   • levothyroxine (SYNTHROID) 50 MCG Tab Take 1 Tablet by mouth every morning on an empty stomach. 90 Tablet 3   • B Complex Vitamins (B COMPLEX-B12 PO) Take  by mouth every day.     • VITAMIN A PO Take  by mouth every day.     • Cholecalciferol (VITAMIN D3 PO) Take  by mouth every day.       No current [facility]-ordered facility-administered medications on file.     Patient has no known allergies.    Health Maintenance: Completed    ROS: see hpi  Gen: no fevers/chills  Pulm: no sob, no cough  CV: no chest pain, no palpitations, no edema  GI: no nausea/vomiting, no diarrhea  Skin: no rash    Objective:   Exam:  /58   Pulse 78   Temp 36.6 °C (97.8 °F)   Resp 14   Ht 1.778 m (5' 10\")   Wt 62.9 kg (138 lb 9.6 oz)   SpO2 98%   BMI 19.89 kg/m²    Body mass index is 19.89 kg/m².    Gen: Alert and oriented, No " apparent distress.  HEENT: Head atraumatic, normocephalic. Pupils equal and round.  Neck: Neck is supple without lymphadenopathy.   Lungs: Normal effort, CTA bilaterally, no wheezes, rhonchi, or rales  CV: Regular rate and rhythm. No murmurs, rubs, or gallops.  Skin:   Multiple seborrheic keratoses scattered across trunk.  Ext: No clubbing, cyanosis, edema.    Lesn Destn - Benign    Date/Time: 8/4/2022 3:38 PM  Performed by: Etta Joe P.A.-C.  Authorized by: Etta Joe P.A.-C.     Number of Lesions: 6  Lesion 1:     Body area: trunk    Trunk location: chest    Destruction method: cryotherapy    Lesion 2:     Body area: trunk    Trunk location: back    Destruction method: cryotherapy    Lesion 3:     Body area: trunk    Trunk location: back    Destruction method: cryotherapy    Lesion 4:     Body area: trunk    Trunk location: back    Destruction method: cryotherapy    Lesion 5:     Body area: trunk    Trunk location: back    Destruction method: cryotherapy    Lesion 6:     Body area: trunk    Trunk location: back    Destruction method: cryotherapy      Comments:  Cryo freeze of 6 lesions.  All lesions seborrheic keratoses and raised.      Assessment & Plan:     76 y.o. male with the following -     1. Hyponatremia  Chronic, recurrent.  Rule out SIADH.  See orders below.  - Basic Metabolic Panel; Future  - OSMOLALITY SERUM; Future  - OSMOLALITY URINE; Future  - URIC ACID; Future  - URINE SODIUM RANDOM; Future  - CORTISOL; Future  - TSH WITH REFLEX TO FT4; Future    2. Hypothyroidism, unspecified type  Chronic, continue to monitor.  - TSH WITH REFLEX TO FT4; Future    3. Waldenstrom macroglobulinemia (HCC)  Chronic, managed by hematology.  Follow-up as scheduled.  Repeat labs per their discretion.  - Basic Metabolic Panel; Future    4. IGT (impaired glucose tolerance)  - HEMOGLOBIN A1C; Future    5. Dry eye  - Referral to Ophthalmology    6. Seborrheic keratoses  6 lesions destroyed via cryotherapy.   Monitor for signs of infection.  Lesions destroyed due to irritation on clothing.  - Leslacey Destn - Benign    7. Colon cancer screening  - Referral to GI for Colonoscopy    8. Other specified personal risk factors, not elsewhere classified  - CT-HEART W/O CONT EVAL CALCIUM; Future    9. 10 year risk of MI or stroke 7.5% or greater  - CT-HEART W/O CONT EVAL CALCIUM; Future    Return for Will notify patient to follow-up pending tests.    Etta Carter) Heath SADLER  Physician Assistant Certified  [facility]    Please note that this dictation was created using voice recognition software. I have made every reasonable attempt to correct obvious errors, but I expect that there are errors of grammar and possibly content that I did not discover before finalizing the note.

## 2022-08-19 ENCOUNTER — TELEPHONE (OUTPATIENT)
Dept: HEMATOLOGY ONCOLOGY | Facility: MEDICAL CENTER | Age: 77
End: 2022-08-19
Payer: MEDICARE

## 2022-08-19 ENCOUNTER — HOSPITAL ENCOUNTER (OUTPATIENT)
Dept: LAB | Facility: MEDICAL CENTER | Age: 77
End: 2022-08-19
Attending: PHYSICIAN ASSISTANT
Payer: MEDICARE

## 2022-08-19 ENCOUNTER — HOSPITAL ENCOUNTER (OUTPATIENT)
Dept: LAB | Facility: MEDICAL CENTER | Age: 77
End: 2022-08-19
Attending: INTERNAL MEDICINE
Payer: MEDICARE

## 2022-08-19 DIAGNOSIS — E87.1 HYPONATREMIA: ICD-10-CM

## 2022-08-19 DIAGNOSIS — C88.0 WALDENSTROM MACROGLOBULINEMIA (HCC): ICD-10-CM

## 2022-08-19 DIAGNOSIS — R73.02 IGT (IMPAIRED GLUCOSE TOLERANCE): ICD-10-CM

## 2022-08-19 DIAGNOSIS — E03.9 HYPOTHYROIDISM, UNSPECIFIED TYPE: ICD-10-CM

## 2022-08-19 LAB
ALBUMIN SERPL BCP-MCNC: 4.7 G/DL (ref 3.2–4.9)
ALBUMIN/GLOB SERPL: 1.5 G/DL
ALP SERPL-CCNC: 62 U/L (ref 30–99)
ALT SERPL-CCNC: 10 U/L (ref 2–50)
ANION GAP SERPL CALC-SCNC: 11 MMOL/L (ref 7–16)
AST SERPL-CCNC: 15 U/L (ref 12–45)
BASOPHILS # BLD AUTO: 0.4 % (ref 0–1.8)
BASOPHILS # BLD: 0.03 K/UL (ref 0–0.12)
BILIRUB SERPL-MCNC: 0.6 MG/DL (ref 0.1–1.5)
BUN SERPL-MCNC: 9 MG/DL (ref 8–22)
CALCIUM SERPL-MCNC: 9.6 MG/DL (ref 8.5–10.5)
CHLORIDE SERPL-SCNC: 101 MMOL/L (ref 96–112)
CO2 SERPL-SCNC: 25 MMOL/L (ref 20–33)
CORTIS SERPL-MCNC: 13.2 UG/DL (ref 0–23)
CREAT SERPL-MCNC: 0.85 MG/DL (ref 0.5–1.4)
EOSINOPHIL # BLD AUTO: 0.11 K/UL (ref 0–0.51)
EOSINOPHIL NFR BLD: 1.6 % (ref 0–6.9)
ERYTHROCYTE [DISTWIDTH] IN BLOOD BY AUTOMATED COUNT: 44.9 FL (ref 35.9–50)
EST. AVERAGE GLUCOSE BLD GHB EST-MCNC: 123 MG/DL
GFR SERPLBLD CREATININE-BSD FMLA CKD-EPI: 90 ML/MIN/1.73 M 2
GLOBULIN SER CALC-MCNC: 3.2 G/DL (ref 1.9–3.5)
GLUCOSE SERPL-MCNC: 130 MG/DL (ref 65–99)
HBA1C MFR BLD: 5.9 % (ref 4–5.6)
HCT VFR BLD AUTO: 41 % (ref 42–52)
HGB BLD-MCNC: 14.6 G/DL (ref 14–18)
IMM GRANULOCYTES # BLD AUTO: 0.02 K/UL (ref 0–0.11)
IMM GRANULOCYTES NFR BLD AUTO: 0.3 % (ref 0–0.9)
LDH SERPL L TO P-CCNC: 141 U/L (ref 107–266)
LYMPHOCYTES # BLD AUTO: 2.34 K/UL (ref 1–4.8)
LYMPHOCYTES NFR BLD: 33.6 % (ref 22–41)
MCH RBC QN AUTO: 34.4 PG (ref 27–33)
MCHC RBC AUTO-ENTMCNC: 35.6 G/DL (ref 33.7–35.3)
MCV RBC AUTO: 96.5 FL (ref 81.4–97.8)
MONOCYTES # BLD AUTO: 0.6 K/UL (ref 0–0.85)
MONOCYTES NFR BLD AUTO: 8.6 % (ref 0–13.4)
NEUTROPHILS # BLD AUTO: 3.86 K/UL (ref 1.82–7.42)
NEUTROPHILS NFR BLD: 55.5 % (ref 44–72)
NRBC # BLD AUTO: 0 K/UL
NRBC BLD-RTO: 0 /100 WBC
OSMOLALITY SERPL: 287 MOSM/KG H2O (ref 278–298)
OSMOLALITY UR: 548 MOSM/KG H2O (ref 300–900)
PLATELET # BLD AUTO: 293 K/UL (ref 164–446)
PMV BLD AUTO: 8.4 FL (ref 9–12.9)
POTASSIUM SERPL-SCNC: 4.3 MMOL/L (ref 3.6–5.5)
PROT SERPL-MCNC: 7.9 G/DL (ref 6–8.2)
RBC # BLD AUTO: 4.25 M/UL (ref 4.7–6.1)
SODIUM SERPL-SCNC: 137 MMOL/L (ref 135–145)
SODIUM UR-SCNC: 96 MMOL/L
TSH SERPL DL<=0.005 MIU/L-ACNC: 4.09 UIU/ML (ref 0.38–5.33)
URATE SERPL-MCNC: 4.6 MG/DL (ref 2.5–8.3)
WBC # BLD AUTO: 7 K/UL (ref 4.8–10.8)

## 2022-08-19 PROCEDURE — 80053 COMPREHEN METABOLIC PANEL: CPT

## 2022-08-19 PROCEDURE — 83935 ASSAY OF URINE OSMOLALITY: CPT

## 2022-08-19 PROCEDURE — 83930 ASSAY OF BLOOD OSMOLALITY: CPT

## 2022-08-19 PROCEDURE — 82784 ASSAY IGA/IGD/IGG/IGM EACH: CPT

## 2022-08-19 PROCEDURE — 85025 COMPLETE CBC W/AUTO DIFF WBC: CPT

## 2022-08-19 PROCEDURE — 83615 LACTATE (LD) (LDH) ENZYME: CPT

## 2022-08-19 PROCEDURE — 84165 PROTEIN E-PHORESIS SERUM: CPT

## 2022-08-19 PROCEDURE — 84300 ASSAY OF URINE SODIUM: CPT

## 2022-08-19 PROCEDURE — 36415 COLL VENOUS BLD VENIPUNCTURE: CPT

## 2022-08-19 PROCEDURE — 82533 TOTAL CORTISOL: CPT

## 2022-08-19 PROCEDURE — 84155 ASSAY OF PROTEIN SERUM: CPT | Mod: XU

## 2022-08-19 PROCEDURE — 84550 ASSAY OF BLOOD/URIC ACID: CPT

## 2022-08-19 PROCEDURE — 83521 IG LIGHT CHAINS FREE EACH: CPT | Mod: 91

## 2022-08-19 PROCEDURE — 86334 IMMUNOFIX E-PHORESIS SERUM: CPT

## 2022-08-19 PROCEDURE — 83036 HEMOGLOBIN GLYCOSYLATED A1C: CPT

## 2022-08-19 PROCEDURE — 84443 ASSAY THYROID STIM HORMONE: CPT

## 2022-08-19 NOTE — TELEPHONE ENCOUNTER
Patient asking to be seen prior to 9/6/22.  (He will be on a plane to [facility] that day)

## 2022-08-22 LAB
ALBUMIN SERPL ELPH-MCNC: 4.32 G/DL (ref 3.75–5.01)
ALPHA1 GLOB SERPL ELPH-MCNC: 0.34 G/DL (ref 0.19–0.46)
ALPHA2 GLOB SERPL ELPH-MCNC: 0.94 G/DL (ref 0.48–1.05)
B-GLOBULIN SERPL ELPH-MCNC: 0.65 G/DL (ref 0.48–1.1)
EER MONOCLONAL PROTEIN AND FLC, SERUM Q5224: ABNORMAL
GAMMA GLOB SERPL ELPH-MCNC: 1.46 G/DL (ref 0.62–1.51)
IGA SERPL-MCNC: 71 MG/DL (ref 68–408)
IGG SERPL-MCNC: 793 MG/DL (ref 768–1632)
IGM SERPL-MCNC: 1108 MG/DL (ref 35–263)
INTERPRETATION SERPL IFE-IMP: ABNORMAL
INTERPRETATION SERPL IFE-IMP: ABNORMAL
KAPPA LC FREE SER-MCNC: 37.69 MG/L (ref 3.3–19.4)
KAPPA LC FREE/LAMBDA FREE SER NEPH: 4.39 {RATIO} (ref 0.26–1.65)
LAMBDA LC FREE SERPL-MCNC: 8.58 MG/L (ref 5.71–26.3)
PROT SERPL-MCNC: 7.7 G/DL (ref 6.3–8.2)

## 2022-08-30 ENCOUNTER — HOSPITAL ENCOUNTER (OUTPATIENT)
Dept: HEMATOLOGY ONCOLOGY | Facility: MEDICAL CENTER | Age: 77
End: 2022-08-30
Attending: NURSE PRACTITIONER
Payer: MEDICARE

## 2022-08-30 VITALS
WEIGHT: 140 LBS | DIASTOLIC BLOOD PRESSURE: 70 MMHG | OXYGEN SATURATION: 100 % | HEIGHT: 70 IN | SYSTOLIC BLOOD PRESSURE: 126 MMHG | BODY MASS INDEX: 20.04 KG/M2 | HEART RATE: 72 BPM | TEMPERATURE: 98.5 F | RESPIRATION RATE: 14 BRPM

## 2022-08-30 DIAGNOSIS — C88.0 WALDENSTROM MACROGLOBULINEMIA (HCC): ICD-10-CM

## 2022-08-30 DIAGNOSIS — Z09 ENCOUNTER FOR HEMATOLOGY FOLLOW-UP: ICD-10-CM

## 2022-08-30 PROCEDURE — 99212 OFFICE O/P EST SF 10 MIN: CPT | Performed by: NURSE PRACTITIONER

## 2022-08-30 PROCEDURE — 99214 OFFICE O/P EST MOD 30 MIN: CPT | Performed by: NURSE PRACTITIONER

## 2022-08-30 ASSESSMENT — ENCOUNTER SYMPTOMS
VOMITING: 0
CONSTIPATION: 0
DIARRHEA: 0
BLOOD IN STOOL: 0
TINGLING: 1
MYALGIAS: 0
NAUSEA: 0
INSOMNIA: 0
WHEEZING: 0
CHILLS: 0
PALPITATIONS: 0
WEIGHT LOSS: 0
DIZZINESS: 1
FEVER: 0
HEADACHES: 0
DIAPHORESIS: 0
COUGH: 0
SHORTNESS OF BREATH: 0

## 2022-08-30 ASSESSMENT — FIBROSIS 4 INDEX: FIB4 SCORE: 1.21

## 2022-08-30 NOTE — PROGRESS NOTES
"Subjective     Chirag Damon Jr. is a 76 y.o. male who presents with Other (Pineda/ SCP/ Waldenstrom macroglobulinemia / 3 mo FV)          HPI  Mr. 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 He is unaccompanied for today's visit.    Patient referred to [facility] in 12/2021 per PCP for further evaluation of abnormal labs, macrocytosis with anemia. UPEP and SPEP were completed with serum immunoglobulins showing elevated IgM, low IgG, normal IgA, elevated kappa light chains and kappa/lambda ratio. Patient underwent bone marrow biopsy 1/21/2022 with findings consistent with lymphoplasmacytic lymphoma (Waldenström's macroglobulinemia): MYD88 positive, normal viscosity, CRAB criteria within acceptable limits.    Patient continues at his baseline and is essentially asymptomatic. Patient remains asymptomatic with no complaints of B symptoms and CRAB criteria remains within acceptable limits.      Review of Systems   Constitutional:  Negative for chills, diaphoresis (\"probably\", no change of shirt or linens), fever, malaise/fatigue (usual activity) and weight loss (stable).   Respiratory:  Negative for cough, shortness of breath and wheezing.    Cardiovascular:  Negative for chest pain, palpitations and leg swelling.   Gastrointestinal:  Negative for blood in stool, constipation (Last BM this am), diarrhea, melena, nausea and vomiting.   Genitourinary:  Negative for dysuria and hematuria.   Musculoskeletal:  Negative for joint pain and myalgias (consistent with baseline; stiffness, cramping in legs).   Neurological:  Positive for dizziness (when first up (fast changes)) and tingling (positional). Negative for headaches.   Psychiatric/Behavioral:  The patient does not have insomnia.        No Known Allergies      Current Outpatient Medications on File Prior to Encounter   Medication Sig Dispense Refill    B Complex Vitamins " "(B COMPLEX-B12 PO) Take  by mouth every day.      VITAMIN A PO Take  by mouth every day.      Cholecalciferol (VITAMIN D3 PO) Take  by mouth every day.       No current facility-administered medications on file prior to encounter.              Objective     /70   Pulse 72   Temp 36.9 °C (98.5 °F) (Temporal)   Resp 14   Ht 1.778 m (5' 10\")   Wt 63.5 kg (140 lb)   SpO2 100%   BMI 20.09 kg/m²      Physical Exam  Vitals reviewed.   Constitutional:       General: He is not in acute distress.     Appearance: He is well-developed. He is not diaphoretic.   HENT:      Head: Normocephalic and atraumatic.   Eyes:      General: No scleral icterus.        Right eye: No discharge.         Left eye: No discharge.   Cardiovascular:      Rate and Rhythm: Normal rate and regular rhythm.      Heart sounds: Normal heart sounds. No murmur heard.    No friction rub. No gallop.   Pulmonary:      Effort: Pulmonary effort is normal. No respiratory distress.      Breath sounds: Normal breath sounds. No wheezing.   Abdominal:      General: There is no distension.      Palpations: Abdomen is soft.      Tenderness: There is no abdominal tenderness.   Musculoskeletal:         General: Normal range of motion.      Cervical back: Normal range of motion.   Skin:     General: Skin is warm and dry.      Coloration: Skin is not pale.      Findings: No erythema or rash.   Neurological:      Mental Status: He is alert and oriented to person, place, and time.   Psychiatric:         Behavior: Behavior normal.     No visits with results within 1 Day(s) from this visit.   Latest known visit with results is:   Hospital Outpatient Visit on 08/19/2022   Component Date Value Ref Range Status    Albumin 08/19/2022 4.32  3.75 - 5.01 g/dL Final    Alpha-1 Globulin 08/19/2022 0.34  0.19 - 0.46 g/dL Final    Alpha-2 Globulin 08/19/2022 0.94  0.48 - 1.05 g/dL Final    Beta Globulin 08/19/2022 0.65  0.48 - 1.10 g/dL Final    Gamma Globulin 08/19/2022 1.46 "  0.62 - 1.51 g/dL Final    Immunofixation 08/19/2022 MARJAN Done   Final    Immunoglobulin G 08/19/2022 793  768 - 1632 mg/dL Final    Comment: REFERENCE INTERVAL: Immunoglobulin G  Access complete set of age- and/or gender-specific reference  intervals for this test in the [facility] Test Directory  ([email or web address]).      Immunoglobulin A 08/19/2022 71  68 - 408 mg/dL Final    Comment: REFERENCE INTERVAL: Immunoglobulin A  Access complete set of age- and/or gender-specific reference  intervals for this test in the [facility] Test Directory  ([email or web address]).      Immunoglobulin M 08/19/2022 1108 (A) 35 - 263 mg/dL Final    Comment: REFERENCE INTERVAL: Immunoglobulin M  Access complete set of age- and/or gender-specific reference  intervals for this test in the [facility] Test Directory  ([email or web address]).      Total Protein, Serum 08/19/2022 7.7  6.3 - 8.2 g/dL Final    Free Kappa Light Chains 08/19/2022 37.69 (A) 3.30 - 19.40 mg/L Final    Comment: INTERPRETIVE INFORMATION: Kappa Qnt Free Light Chains  Undetected antigen excess is a rare event but cannot be excluded.  Free light chain results should always be interpreted in  conjunction with other clinical and laboratory findings.      Free Lambda Light Chains 08/19/2022 8.58  5.71 - 26.30 mg/L Final    Comment: INTERPRETIVE INFORMATION: Lambda Qnt Free Light Chains  Undetected antigen excess is a rare event but cannot be excluded.  Free light chain results should always be interpreted in  conjunction with other clinical and laboratory findings.      [place]-Lambda Ratio 08/19/2022 4.39 (A) 0.26 - 1.65 Final    Interpretation 08/19/2022 See Note   Final    Comment: M-spike in the gamma region.  The monoclonal protein peak  accounts for 0.96 g/dL of the total 1.46 g/dL of protein in  the gamma region.  MARJAN gel pattern shows an IgM type kappa  monoclonal protein.      EER Monoclonal Protein and FLC, Se* 08/19/2022 See Note   Final    Comment: Authorized individuals  can access the [facility]  Enhanced Report using the following link:    [email or web address]  Performed By: [facility]  [address] [place] [id number]  : Minh Cintron MD, PhD      LDH Total 08/19/2022 141  107 - 266 U/L Final    Sodium 08/19/2022 137  135 - 145 mmol/L Final    Potassium 08/19/2022 4.3  3.6 - 5.5 mmol/L Final    Chloride 08/19/2022 101  96 - 112 mmol/L Final    Co2 08/19/2022 25  20 - 33 mmol/L Final    Anion Gap 08/19/2022 11.0  7.0 - 16.0 Final    Glucose 08/19/2022 130 (A) 65 - 99 mg/dL Final    Bun 08/19/2022 9  8 - 22 mg/dL Final    Creatinine 08/19/2022 0.85  0.50 - 1.40 mg/dL Final    Calcium 08/19/2022 9.6  8.5 - 10.5 mg/dL Final    AST(SGOT) 08/19/2022 15  12 - 45 U/L Final    ALT(SGPT) 08/19/2022 10  2 - 50 U/L Final    Alkaline Phosphatase 08/19/2022 62  30 - 99 U/L Final    Total Bilirubin 08/19/2022 0.6  0.1 - 1.5 mg/dL Final    Albumin 08/19/2022 4.7  3.2 - 4.9 g/dL Final    Total Protein 08/19/2022 7.9  6.0 - 8.2 g/dL Final    Globulin 08/19/2022 3.2  1.9 - 3.5 g/dL Final    A-G Ratio 08/19/2022 1.5  g/dL Final    WBC 08/19/2022 7.0  4.8 - 10.8 K/uL Final    RBC 08/19/2022 4.25 (A) 4.70 - 6.10 M/uL Final    Hemoglobin 08/19/2022 14.6  14.0 - 18.0 g/dL Final    Hematocrit 08/19/2022 41.0 (A) 42.0 - 52.0 % Final    MCV 08/19/2022 96.5  81.4 - 97.8 fL Final    MCH 08/19/2022 34.4 (A) 27.0 - 33.0 pg Final    MCHC 08/19/2022 35.6 (A) 33.7 - 35.3 g/dL Final    RDW 08/19/2022 44.9  35.9 - 50.0 fL Final    Platelet Count 08/19/2022 293  164 - 446 K/uL Final    MPV 08/19/2022 8.4 (A) 9.0 - 12.9 fL Final    Neutrophils-Polys 08/19/2022 55.50  44.00 - 72.00 % Final    Lymphocytes 08/19/2022 33.60  22.00 - 41.00 % Final    Monocytes 08/19/2022 8.60  0.00 - 13.40 % Final    Eosinophils 08/19/2022 1.60  0.00 - 6.90 % Final    Basophils 08/19/2022 0.40  0.00 - 1.80 % Final    Immature Granulocytes 08/19/2022 0.30  0.00 - 0.90 % Final     Nucleated RBC 08/19/2022 0.00  /100 WBC Final    Neutrophils (Absolute) 08/19/2022 3.86  1.82 - 7.42 K/uL Final    Includes immature neutrophils, if present.    Lymphs (Absolute) 08/19/2022 2.34  1.00 - 4.80 K/uL Final    Monos (Absolute) 08/19/2022 0.60  0.00 - 0.85 K/uL Final    Eos (Absolute) 08/19/2022 0.11  0.00 - 0.51 K/uL Final    Baso (Absolute) 08/19/2022 0.03  0.00 - 0.12 K/uL Final    Immature Granulocytes (abs) 08/19/2022 0.02  0.00 - 0.11 K/uL Final    NRBC (Absolute) 08/19/2022 0.00  K/uL Final    GFR (CKD-EPI) 08/19/2022 90  >60 mL/min/1.73 m 2 Final    Comment: Effective 3/15/2022, estimated Glomerular Filtration Rate  is calculated using race neutral CKD-EPI 2021 equation  per NKF-ASN recommendations.              Latest Reference Range & Units 11/12/21 10:45 5/6/22 13:51 8/19/22 07:33   Immunoglobulin A 68 - 408 mg/dL 70 75 71   Immunoglobulin G 768 - 1632 mg/dL 745 (L) 771 793   Immunoglobulin M 35 - 263 mg/dL 980 (H) 1040 (H) 1108 (H)   (L): Data is abnormally low  (H): Data is abnormally high         Latest Reference Range & Units 11/12/21 10:45 11/30/21 15:28 12/29/21 11:31 5/6/22 13:51 8/19/22 07:33   Albumin 3.75 - 5.01 g/dL 4.33   4.09 4.32   Gamma Globulin 0.62 - 1.51 g/dL 1.06   1.13 1.46   Alpha-1 Globulin 0.19 - 0.46 g/dL 0.27   0.35 0.34   Alpha-2 Globulin 0.48 - 1.05 g/dL 0.80   0.92 0.94   Beta Globulin 0.48 - 1.10 g/dL 0.85   1.01 0.65   Free Urinary Kappa Excretion/Day mg/d  See Note      Free Kappa Light Chains 3.30 - 19.40 mg/L  25.28 29.98 (H) 31.93 (H) 37.69 (H)   Free Lambda Light Chains 5.71 - 26.30 mg/L  2.30 7.31 10.71 8.58   Free Lambda Excretion/Day mg/d  See Note      Kappa-Lambda Ratio 0.26 - 1.65    4.10 (H) 2.98 (H) 4.39 (H)   (H): Data is abnormally high             Assessment & Plan     1. Waldenstrom macroglobulinemia (HCC)        2. Encounter for hematology follow-up            1.  Waldenström macroglobulinemia: Diagnosed 1/21/22; no B symptoms, stable CRAB  criteria - no indication for treatment at this time.    Patient will continue to monitor symptoms. CBC, CMP, LDH, myeloma labs have been evaluated and found to be within acceptable limits. IgM and kappa-lambda ratio are mildly elevated but consistent with baseline, since diagnosis. He will repeat labs and return for reevaluation in 6 months, sooner as needed.          The patient verbalized agreement and understanding of current plan. All questions and concerns were addressed at time of visit.    Please note that this dictation was created using voice recognition software. I have made every reasonable attempt to correct obvious errors, but I expect that there are errors of grammar and possibly content that I did not discover before finalizing the note.

## 2022-08-30 NOTE — Clinical Note
He wanted me to tell you that he got that mountain e-bike and is keeping up with his 30 year old kids

## 2022-09-27 DIAGNOSIS — E03.9 HYPOTHYROIDISM, UNSPECIFIED TYPE: ICD-10-CM

## 2022-09-29 RX ORDER — LEVOTHYROXINE SODIUM 0.05 MG/1
50 TABLET ORAL
Qty: 100 TABLET | Refills: 3 | Status: SHIPPED | OUTPATIENT
Start: 2022-09-29 | End: 2023-01-09 | Stop reason: SDUPTHER

## 2022-10-04 ENCOUNTER — OFFICE VISIT (OUTPATIENT)
Dept: MEDICAL GROUP | Facility: IMAGING CENTER | Age: 77
End: 2022-10-04
Payer: MEDICARE

## 2022-10-04 VITALS
RESPIRATION RATE: 14 BRPM | OXYGEN SATURATION: 98 % | HEART RATE: 74 BPM | WEIGHT: 148 LBS | BODY MASS INDEX: 21.19 KG/M2 | SYSTOLIC BLOOD PRESSURE: 116 MMHG | DIASTOLIC BLOOD PRESSURE: 58 MMHG | HEIGHT: 70 IN | TEMPERATURE: 97.6 F

## 2022-10-04 DIAGNOSIS — L82.1 SEBORRHEIC KERATOSES: ICD-10-CM

## 2022-10-04 DIAGNOSIS — C88.0 WALDENSTROM MACROGLOBULINEMIA (HCC): ICD-10-CM

## 2022-10-04 DIAGNOSIS — R73.02 IGT (IMPAIRED GLUCOSE TOLERANCE): ICD-10-CM

## 2022-10-04 DIAGNOSIS — E03.9 HYPOTHYROIDISM, UNSPECIFIED TYPE: ICD-10-CM

## 2022-10-04 PROBLEM — E87.1 HYPONATREMIA: Status: RESOLVED | Noted: 2021-04-13 | Resolved: 2022-10-04

## 2022-10-04 PROCEDURE — 99213 OFFICE O/P EST LOW 20 MIN: CPT | Mod: 25 | Performed by: PHYSICIAN ASSISTANT

## 2022-10-04 PROCEDURE — 17110 DESTRUCTION B9 LES UP TO 14: CPT | Performed by: PHYSICIAN ASSISTANT

## 2022-10-04 ASSESSMENT — PAIN SCALES - GENERAL: PAINLEVEL: NO PAIN

## 2022-10-04 ASSESSMENT — FIBROSIS 4 INDEX: FIB4 SCORE: 1.21

## 2022-10-04 NOTE — PROGRESS NOTES
Subjective:     CC:   Chief Complaint   Patient presents with    Lab Results    Other     Sun spots on right side of head by ear xfew months          HPI:   Chirag presents today to discuss:    IGT (impaired glucose tolerance)  Chronic, mild increase in A1c.     Latest Reference Range & Units 7/1/20 08:20 11/12/21 10:45 8/19/22 07:35   Glycohemoglobin 4.0 - 5.6 % 6.1 (H) 5.7 (H) 5.9 (H)       Hypothyroidism  Chronic, controlled and stable.  Maintained on Synthroid 50 MCG daily.    Waldenstrom macroglobulinemia (HCC)  Chronic, controlled and stable.  Previous hematology notes reviewed.  No hypercalcemia.  Following up in February with Dr. Sanchez for a recheck.    Seborrheic keratoses  Patient admits to sun spots on his right neck.  He states that they often get snagged on his clothing and he accidentally scratches them which causes them to bleed and become irritated.  He is requesting cryo freeze.      Past Medical History:   Diagnosis Date    Cataract     alonso iol    DDD (degenerative disc disease), cervical     DDD (degenerative disc disease), cervical     DDD (degenerative disc disease), lumbar     Heart murmur     Hiatus hernia syndrome     Hyponatremia 4/13/2021    Lumbar spinal stenosis     Vitamin D deficiency 2/2/2018     Family History   Problem Relation Age of Onset    Cancer Mother 50        breast    Cancer Paternal Grandmother 70        colon cancer     Past Surgical History:   Procedure Laterality Date    RI DX BONE MARROW ASPIRATIONS Left 1/21/2022    Procedure: ASPIRATION, BONE MARROW - DR SANCHEZ;  Surgeon: Tyrel Mcintyre M.D.;  Location: SURGERY SAME DAY [facility];  Service: Orthopedics    RI DX BONE MARROW BIOPSIES Left 1/21/2022    Procedure: BIOPSY, BONE MARROW, USING NEEDLE OR TROCAR;  Surgeon: Tyrel Mcintyre M.D.;  Location: SURGERY SAME DAY [facility];  Service: Orthopedics    INGUINAL HERNIA REPAIR ROBOTIC Left 4/8/2016    Procedure: INGUINAL HERNIA REPAIR ROBOTIC WITH MESH;  Surgeon: Dustin Maria  "M.D.;  Location: SURGERY SAME DAY [facility];  Service:     OTHER  2006    alonso iol    CATARACT EXTRACTION WITH IOL Bilateral     HERNIA REPAIR      bilat     Social History     Tobacco Use    Smoking status: Never    Smokeless tobacco: Never   Vaping Use    Vaping Use: Never used   Substance Use Topics    Alcohol use: Yes     Alcohol/week: 0.6 oz     Types: 1 Glasses of wine per week     Comment: 4-5/week; beer once in a while. 1/18/22: 1 drink per day.    Drug use: No     Social History     Social History Narrative    Not on file     Current Outpatient Medications Ordered in Epic   Medication Sig Dispense Refill    levothyroxine (SYNTHROID) 50 MCG Tab Take 1 Tablet by mouth every morning on an empty stomach. 100 Tablet 3    B Complex Vitamins (B COMPLEX-B12 PO) Take  by mouth every day.      VITAMIN A PO Take  by mouth every day.      Cholecalciferol (VITAMIN D3 PO) Take  by mouth every day.       No current [facility]-ordered facility-administered medications on file.     Patient has no known allergies.    Health Maintenance: Patient would like to wait on flu shot    ROS: see hpi  Gen: no fevers/chills  Pulm: no sob, no cough  CV: no chest pain, no palpitations, no edema  GI: no nausea/vomiting, no diarrhea  Skin: no rash    Objective:   Exam:  /58 (BP Location: Left arm, Patient Position: Sitting, BP Cuff Size: Adult)   Pulse 74   Temp 36.4 °C (97.6 °F) (Temporal)   Resp 14   Ht 1.778 m (5' 10\")   Wt 67.1 kg (148 lb)   SpO2 98%   BMI 21.24 kg/m²    Body mass index is 21.24 kg/m².    Gen: Alert and oriented, No apparent distress.  HEENT: Head atraumatic, normocephalic. Pupils equal and round.  Neck: Neck is supple without lymphadenopathy.  Right lateral neck with 4 less than 2 mm raised brown papules with mild excoriation.  Lungs: Normal effort, CTA bilaterally, no wheezes, rhonchi, or rales  CV: Regular rate and rhythm. No murmurs, rubs, or gallops.  Ext: No clubbing, cyanosis, edema.    Lesn Destn - " Benign    Date/Time: 10/4/2022 1:52 PM  Performed by: Etta Joe P.A.-C.  Authorized by: Etta Joe P.A.-C.     Number of Lesions: 4  Lesion 1:     Body area: head/neck    Head/neck location: neck    Initial size (mm): 2    Malignancy: benign lesion      Destruction method: cryotherapy    Lesion 2:     Body area: head/neck    Head/neck location: neck    Initial size (mm): 2    Malignancy: benign lesion      Destruction method: cryotherapy    Lesion 3:     Body area: head/neck    Head/neck location: neck    Initial size (mm): 2    Malignancy: benign lesion      Destruction method: cryotherapy    Lesion 4:     Body area: head/neck    Head/neck location: neck    Initial size (mm): 2    Malignancy: benign lesion      Destruction method: cryotherapy          Assessment & Plan:     76 y.o. male with the following -     1. IGT (impaired glucose tolerance)  Chronic, controlled and stable.  Discussed dietary changes and avoiding simple sugars/starches and added sugar.    2. Hypothyroidism, unspecified type  Chronic, controlled and stable. Continue current regimen of Synthroid 50 mcg daily.  Hyponatremia resolved.    3. Seborrheic keratoses  With history of irritation.  Cryotherapy performed without complication.  Monitor for signs of infection such as redness, swelling, drainage, warmth.  - Lesn Destn - Benign    4. Waldenstrom macroglobulinemia (HCC)  Chronic, controlled and stable. Continue current regimen.  Hematology notes reviewed.  Follow-up as scheduled.    Return in about 8 weeks (around 12/1/2022) for Annual wellness exam.  Patient to complete CT cardiac screening prior to AWV.  If elevated will start statin therapy.  Currently refusing.    Etta Joe PA-C ([place])  Physician Assistant Certified  [facility]    Please note that this dictation was created using voice recognition software. I have made every reasonable attempt to correct obvious errors, but I expect that there are errors of  grammar and possibly content that I did not discover before finalizing the note.

## 2022-10-04 NOTE — PATIENT INSTRUCTIONS
It was a pleasure meeting with you today at [facility]!    Your medical history/records and medications were reviewed today.     Please review my practice information below:    If you have any prescription refill requests, please send them via [employer] or discuss with your provider at the start of your office visits. Please allow 3-5 business days for lab and testing review and you will be contacted via [email or web address] with those results, or if advised to make a follow up appointment regarding those results, then please do so.     Once resulted, your lab/test/imaging results will show up automatically in your MyChart. Please wait for my interpretation and recommendations prior to viewing your results to avoid any unnecessary confusion or misinterpretation. I will address all of the lab values that I interpret as abnormal and message you accordingly on your MyChart. I will always send you a message about your results even if they are normal. If you do not hear back from me within 5-7 business days after completing your tests, then please send me a message on [employer] so I can obtain your results (especially if you went to an outside lab or imaging center - [facility], [facility], etc).     If you have any additional questions or concerns beyond my interpretation of your results, please make an appointment with me to discuss in further detail.    Please only use the [email or web address] messaging system for questions regarding your most recent appointment or if advised to use otherwise (glucose or blood pressure reporting).     If you have any new problems or concerns, you must make an appointment to discuss. This includes any referral requests, lab requests (unless advised to notify me for pre-appt labs), medication side effects, or request for medication adjustments.     Please arrive 15 minutes prior to your appointment time to complete your check-in and intake with the medical assistant.      Thank you,    Etta Carter) Heath  DOROTEO  Physician Assistant Certified  [facility]    -----------------------------------------------------------------    Attn: Patients of [facility]:    In an effort to continue to provide excellent and efficient care to our patients, it is vital that we continue to use our resources appropriately. With that, this is a reminder that [employer] is used for prescription refill requests, test results, virtual visits, and chart review only.     Any new questions, concerns/conditions, lab/imaging requests, medication adjustments, new prescriptions, or referral requests do require an appointment (virtually or in person), unless discussed otherwise at your most recent appointment.     Thank you for your understanding,    [facility]

## 2022-10-04 NOTE — ASSESSMENT & PLAN NOTE
Patient admits to sun spots on his right neck.  He states that they often get snagged on his clothing and he accidentally scratches them which causes them to bleed and become irritated.  He is requesting cryo freeze.

## 2022-10-04 NOTE — ASSESSMENT & PLAN NOTE
Chronic, mild increase in A1c.     Latest Reference Range & Units 7/1/20 08:20 11/12/21 10:45 8/19/22 07:35   Glycohemoglobin 4.0 - 5.6 % 6.1 (H) 5.7 (H) 5.9 (H)

## 2022-10-04 NOTE — ASSESSMENT & PLAN NOTE
Chronic, controlled and stable.  Previous hematology notes reviewed.  No hypercalcemia.  Following up in February with Dr. Pineda for a recheck.

## 2022-11-08 ENCOUNTER — PATIENT MESSAGE (OUTPATIENT)
Dept: HEALTH INFORMATION MANAGEMENT | Facility: OTHER | Age: 77
End: 2022-11-08

## 2022-12-12 ENCOUNTER — APPOINTMENT (OUTPATIENT)
Dept: RADIOLOGY | Facility: MEDICAL CENTER | Age: 77
End: 2022-12-12
Attending: PHYSICIAN ASSISTANT

## 2022-12-16 ENCOUNTER — OFFICE VISIT (OUTPATIENT)
Dept: MEDICAL GROUP | Facility: IMAGING CENTER | Age: 77
End: 2022-12-16
Payer: MEDICARE

## 2022-12-16 VITALS
SYSTOLIC BLOOD PRESSURE: 134 MMHG | WEIGHT: 140.2 LBS | HEART RATE: 79 BPM | HEIGHT: 70 IN | BODY MASS INDEX: 20.07 KG/M2 | TEMPERATURE: 98.3 F | DIASTOLIC BLOOD PRESSURE: 66 MMHG | OXYGEN SATURATION: 99 %

## 2022-12-16 DIAGNOSIS — Z23 NEED FOR VACCINATION: ICD-10-CM

## 2022-12-16 DIAGNOSIS — Z00.00 ENCOUNTER FOR ANNUAL WELLNESS EXAM IN MEDICARE PATIENT: ICD-10-CM

## 2022-12-16 DIAGNOSIS — E03.9 HYPOTHYROIDISM, UNSPECIFIED TYPE: ICD-10-CM

## 2022-12-16 DIAGNOSIS — Z12.5 PROSTATE CANCER SCREENING: ICD-10-CM

## 2022-12-16 DIAGNOSIS — G31.9 CEREBRAL ATROPHY (HCC): ICD-10-CM

## 2022-12-16 DIAGNOSIS — H43.393 VITREOUS FLOATERS OF BOTH EYES: ICD-10-CM

## 2022-12-16 DIAGNOSIS — R73.02 IGT (IMPAIRED GLUCOSE TOLERANCE): ICD-10-CM

## 2022-12-16 DIAGNOSIS — C88.0 WALDENSTROM MACROGLOBULINEMIA (HCC): ICD-10-CM

## 2022-12-16 PROBLEM — D75.89 MACROCYTOSIS: Status: RESOLVED | Noted: 2021-04-13 | Resolved: 2022-12-16

## 2022-12-16 PROBLEM — L98.9 FACIAL LESION: Status: RESOLVED | Noted: 2022-04-04 | Resolved: 2022-12-16

## 2022-12-16 PROCEDURE — G0008 ADMIN INFLUENZA VIRUS VAC: HCPCS | Performed by: PHYSICIAN ASSISTANT

## 2022-12-16 PROCEDURE — 90662 IIV NO PRSV INCREASED AG IM: CPT | Performed by: PHYSICIAN ASSISTANT

## 2022-12-16 PROCEDURE — G0439 PPPS, SUBSEQ VISIT: HCPCS | Performed by: PHYSICIAN ASSISTANT

## 2022-12-16 ASSESSMENT — FIBROSIS 4 INDEX: FIB4 SCORE: 1.23

## 2022-12-16 ASSESSMENT — PATIENT HEALTH QUESTIONNAIRE - PHQ9: CLINICAL INTERPRETATION OF PHQ2 SCORE: 0

## 2022-12-16 ASSESSMENT — ACTIVITIES OF DAILY LIVING (ADL): BATHING_REQUIRES_ASSISTANCE: 0

## 2022-12-16 ASSESSMENT — ENCOUNTER SYMPTOMS: GENERAL WELL-BEING: EXCELLENT

## 2022-12-16 NOTE — PATIENT INSTRUCTIONS
[facility] - [facility]  [facility]  [address] [place] [id number]  Phone: [phone]  Fax: [phone]    SCHEDULE COLONOSCOPY    SCHEDULE CARDIAC SCAN    FLU SHOT COMPLETED TODAY

## 2022-12-16 NOTE — ASSESSMENT & PLAN NOTE
Chronic, controlled and stable.  Follows up with hematology, Dr. Pineda.  Next appointment in February.  He states that sometimes he gets a little dizzy and notes mild weight loss.  No muscle cramping or constipation.  No headaches.

## 2022-12-16 NOTE — ASSESSMENT & PLAN NOTE
Noted on previous imaging, Patient admits to mild short-term memory loss.  Nothing that affects his day-to-day life.

## 2022-12-16 NOTE — PROGRESS NOTES
Chief Complaint   Patient presents with    Annual Exam       HPI:  Chirag Damon Jr. is a 77 y.o. here for Medicare Annual Wellness Visit     Waldenstrom macroglobulinemia (HCC)  Chronic, controlled and stable.  Follows up with hematology, Dr. Pineda.  Next appointment in February.  He states that sometimes he gets a little dizzy and notes mild weight loss.  No muscle cramping or constipation.  No headaches.    IGT (impaired glucose tolerance)  Chronic, controlled and stable.    Hypothyroidism  Chronic, controlled and stable on Synthroid 50 mcg daily.    Patient Active Problem List    Diagnosis Date Noted    Vitreous floaters of both eyes 12/16/2022    Dry eye 08/04/2022    Numerous moles 04/04/2022    10 year risk of MI or stroke 7.5% or greater 04/04/2022    Plantar wart 03/07/2022    Waldenstrom macroglobulinemia (HCC) 11/29/2021    Seborrheic keratoses 04/13/2021    DDD (degenerative disc disease), lumbar 05/09/2017    Lumbar spinal stenosis 05/09/2017    Hypothyroidism 05/09/2017    IGT (impaired glucose tolerance) 05/09/2017    DDD (degenerative disc disease), cervical 10/16/2016    Hernia, inguinal, left 04/08/2016       Current Outpatient Medications   Medication Sig Dispense Refill    levothyroxine (SYNTHROID) 50 MCG Tab Take 1 Tablet by mouth every morning on an empty stomach. 100 Tablet 3    B Complex Vitamins (B COMPLEX-B12 PO) Take  by mouth every day.      VITAMIN A PO Take  by mouth every day.      Cholecalciferol (VITAMIN D3 PO) Take  by mouth every day.       No current facility-administered medications for this visit.          Current supplements as per medication list.     Allergies: Patient has no known allergies.    Current social contact/activities: Stationary bike, cc ski, mountain bike, lift weights     He  reports that he has never smoked. He has never used smokeless tobacco. He reports current alcohol use of about 0.6 oz per week. He reports that he does not use drugs.  Counseling given:  Not Answered    Vaccinations:   ROS:    Gait: Uses no assistive device  Ostomy: No  Other tubes: No  Amputations: No  Chronic oxygen use: No  Last eye exam: years ago  Wears hearing aids: No   : Denies any urinary leakage during the last 6 months    Screening: due for colonoscopy and CT cardiac - plans to schedule next year    Depression Screening  Little interest or pleasure in doing things?  0 - not at all  Feeling down, depressed , or hopeless? 0 - not at all  Trouble falling or staying asleep, or sleeping too much?     Feeling tired or having little energy?     Poor appetite or overeating?     Feeling bad about yourself - or that you are a failure or have let yourself or your family down?    Trouble concentrating on things, such as reading the newspaper or watching television?    Moving or speaking so slowly that other people could have noticed.  Or the opposite - being so fidgety or restless that you have been moving around a lot more than usual?     Thoughts that you would be better off dead, or of hurting yourself?     Patient Health Questionnaire Score:      If depressive symptoms identified deferred to follow up visit unless specifically addressed in assessment and plan.    Interpretation of PHQ-9 Total Score   Score Severity   1-4 No Depression   5-9 Mild Depression   10-14 Moderate Depression   15-19 Moderately Severe Depression   20-27 Severe Depression    Screening for Cognitive Impairment  Three Minute Recall (daughter, heaven, mountain) 3/3    Aguilar clock face with all 12 numbers and set the hands to show 10 past 11.  No Aguilar hand to 11:05  Cognitive concerns identified deferred for follow up unless specifically addressed in assessment and plan.    Fall Risk Assessment  Has the patient had two or more falls in the last year or any fall with injury in the last year?  No    Safety Assessment  Throw rugs on floor.  No  Handrails on all stairs.  Yes  Good lighting in all hallways.  Yes  Difficulty  hearing.  No  Patient counseled about all safety risks that were identified.    Functional Assessment ADLs  Are there any barriers preventing you from cooking for yourself or meeting nutritional needs?  No.    Are there any barriers preventing you from driving safely or obtaining transportation?  No.    Are there any barriers preventing you from using a telephone or calling for help?  No    Are there any barriers preventing you from shopping?  No.    Are there any barriers preventing you from taking care of your own finances?  No    Are there any barriers preventing you from managing your medications?  No    Are there any barriers preventing you from showering, bathing or dressing yourself? No    Are you currently engaging in any exercise or physical activity?  Yes. Stationary bike, cc ski, mountain bike, lift weights  What is your perception of your health? Excellent    Advance Care Planning  Do you have an Advance Directive, Living Will, Durable Power of , or POLST? Yes                 Health Maintenance Summary            Overdue - Annual Wellness Visit (Every 366 Days) Overdue since 10/15/2022      10/14/2021  Subsequent Annual Wellness Visit - Includes PPPS ()    10/14/2021  Visit Dx: Medicare annual wellness visit, subsequent              IMM DTaP/Tdap/Td Vaccine (2 - Td or Tdap) Next due on 7/30/2031 07/30/2021  Imm Admin: Tdap Vaccine              IMM HEP B VACCINE (Series Information) Completed      06/09/2020  Imm Admin: Hep A/HEP B Combined Vaccine (TwinRix)    12/11/2019  Imm Admin: Hep A/HEP B Combined Vaccine (TwinRix)    10/30/2019  Imm Admin: Hep A/HEP B Combined Vaccine (TwinRix)              IMM ZOSTER VACCINES (Series Information) Completed      04/13/2021  Imm Admin: Zoster Vaccine Recombinant (RZV) (SHINGRIX)    07/23/2020  Imm Admin: Zoster Vaccine Recombinant (RZV) (SHINGRIX)    08/16/2016  Imm Admin: Zoster Vaccine Live (ZVL) (Zostavax) - HISTORICAL DATA               HEPATITIS C SCREENING  Completed      2021  HCV Scrn ( [id number] 1xLife)              IMM PNEUMOCOCCAL VACCINE: 65+ Years (Series Information) Completed      2022  Imm Admin: Pneumococcal polysaccharide vaccine (PPSV-23)    2016  Imm Admin: Pneumococcal Conjugate Vaccine (Prevnar/PCV-13)              COVID-19 Vaccine (Series Information) Completed      10/11/2022  Outside Immunization: COVID mRNA Bivalent(MOD)    2022  Outside Immunization: COVID-19 mRNA (MOD)    2021  Outside Immunization: COVID-19 mRNA (MOD)    2021  Imm Admin: MODERNA SARS-COV-2 VACCINE (12+)    2021  Imm Admin: MODERNA SARS-COV-2 VACCINE (12+)              IMM INFLUENZA (Series Information) Completed      2022  Imm Admin: Influenza Vaccine Adult HD    10/14/2021  Imm Admin: Influenza Vaccine Adult HD    2020  Imm Admin: Influenza Vaccine Adult HD    10/30/2019  Imm Admin: Influenza Vaccine Adult HD              IMM MENINGOCOCCAL ACWY VACCINE (Series Information) Aged Out      No completion history exists for this topic.              Discontinued - COLORECTAL CANCER SCREENING  Ordered on 2022        Frequency changed to Never automatically (Topic No Longer Applies)    2018  OCCULT BLOOD FECES IMMUNOASSAY    10/27/2014  REFERRAL TO GI FOR COLONOSCOPY                    Patient Care Team:  Etta Joe P.A.-C. as PCP - General (Physician Assistant)  Carmen Castaneda M.D. as PCP - [facility] Paneled  Carson Pineda M.D. (Medical Oncology)      Social History     Tobacco Use    Smoking status: Never    Smokeless tobacco: Never   Vaping Use    Vaping Use: Some days   Substance Use Topics    Alcohol use: Yes     Alcohol/week: 0.6 oz     Types: 1 Glasses of wine per week     Comment: 4-5/week; beer once in a while. 22: 1 drink per day.    Drug use: No     Family History   Problem Relation Age of Onset    Cancer Mother 50        breast    Cancer Paternal Grandmother 70        colon  "cancer     He  has a past medical history of Cataract, DDD (degenerative disc disease), cervical, DDD (degenerative disc disease), cervical, DDD (degenerative disc disease), lumbar, Heart murmur, Hiatus hernia syndrome, Hyponatremia (4/13/2021), Lumbar spinal stenosis, and Vitamin D deficiency (2/2/2018).   Past Surgical History:   Procedure Laterality Date    MD DX BONE MARROW ASPIRATIONS Left 1/21/2022    Procedure: ASPIRATION, BONE MARROW - DR SANCHEZ;  Surgeon: Tyrel Mcintyre M.D.;  Location: SURGERY SAME DAY [facility];  Service: Orthopedics    MD DX BONE MARROW BIOPSIES Left 1/21/2022    Procedure: BIOPSY, BONE MARROW, USING NEEDLE OR TROCAR;  Surgeon: Tyrel Mcintyre M.D.;  Location: SURGERY SAME DAY [facility];  Service: Orthopedics    INGUINAL HERNIA REPAIR ROBOTIC Left 4/8/2016    Procedure: INGUINAL HERNIA REPAIR ROBOTIC WITH MESH;  Surgeon: Dustin Maria M.D.;  Location: SURGERY SAME DAY [facility];  Service:     OTHER  2006    alonso iol    CATARACT EXTRACTION WITH IOL Bilateral     HERNIA REPAIR      bilat       Exam:   /66 (BP Location: Left arm, Patient Position: Sitting, BP Cuff Size: Adult)   Pulse 79   Temp 36.8 °C (98.3 °F) (Temporal)   Ht 1.778 m (5' 10\")   Wt 63.6 kg (140 lb 3.2 oz)   SpO2 99%  Body mass index is 20.12 kg/m².    Hearing excellent.    Dentition good  Alert, oriented in no acute distress.  Eye contact is good, speech goal directed, affect calm    Assessment and Plan. The following treatment and monitoring plan is recommended:      1. Encounter for annual wellness exam in Medicare patient  PMH/PSH/FH/Social history reviewed.  Vaccinations discussed.  Previous records and labs reviewed. Discussed age appropriate anticipatory guidance.  Patient to set up colonoscopy and schedule calcium score CT scan.    2. Waldenstrom macroglobulinemia (HCC)  Chronic, controlled and stable. Continue current regimen.  Check updated labs, follow-up regarding dizziness and weight loss once labs " completed.  - CBC WITH DIFFERENTIAL; Future    3. IGT (impaired glucose tolerance)  Chronic, controlled and stable. Continue current regimen.   - HEMOGLOBIN A1C; Future  - Comp Metabolic Panel; Future    4. Hypothyroidism, unspecified type  Chronic, controlled and stable. Continue current regimen.   - TSH WITH REFLEX TO FT4; Future    5. Vitreous floaters of both eyes  - Referral to Ophthalmology    6. Need for vaccination  - Influenza Vaccine, High Dose (65+ Only)    7. Prostate cancer screening  - PROSTATE SPECIFIC AG SCREENING; Future    8. Cerebral atrophy (HCC)  Chronic, controlled and stable. Continue current regimen.         Services suggested: No services needed at this time  Health Care Screening: Age-appropriate preventive services recommended by USPTF and ACIP covered by Medicare were discussed today. Services ordered if indicated and agreed upon by the patient.  Referrals offered: Community-based lifestyle interventions to reduce health risks and promote self-management and wellness, fall prevention, nutrition, physical activity, tobacco-use cessation, weight loss, and mental health services as per orders if indicated.    Discussion today about general wellness and lifestyle habits:    Prevent falls and reduce trip hazards; Cautioned about securing or removing rugs.  Have a working fire alarm and carbon monoxide detector;   Engage in regular physical activity and social activities     Follow-up: Return for Will notify patient to follow-up pending tests.

## 2023-01-09 DIAGNOSIS — E03.9 HYPOTHYROIDISM, UNSPECIFIED TYPE: ICD-10-CM

## 2023-01-09 RX ORDER — LEVOTHYROXINE SODIUM 0.05 MG/1
50 TABLET ORAL
Qty: 100 TABLET | Refills: 3 | Status: SHIPPED | OUTPATIENT
Start: 2023-01-09 | End: 2023-05-01 | Stop reason: SDUPTHER

## 2023-01-10 NOTE — TELEPHONE ENCOUNTER
Received request via: Patient    Was the patient seen in the last year in this department? Yes    Does the patient have an active prescription (recently filled or refills available) for medication(s) requested? Yes. Pt should have 3 refills remaining    Does the patient have [facility] Plus and need 100 day supply (blood pressure, diabetes and cholesterol meds only)? Yes, quantity updated to 100 days

## 2023-01-30 DIAGNOSIS — Z13.220 SCREENING CHOLESTEROL LEVEL: ICD-10-CM

## 2023-01-30 DIAGNOSIS — E03.9 HYPOTHYROIDISM, UNSPECIFIED TYPE: ICD-10-CM

## 2023-01-30 DIAGNOSIS — C88.0 WALDENSTROM MACROGLOBULINEMIA (HCC): ICD-10-CM

## 2023-01-30 DIAGNOSIS — R73.02 IGT (IMPAIRED GLUCOSE TOLERANCE): ICD-10-CM

## 2023-01-30 DIAGNOSIS — Z12.5 PROSTATE CANCER SCREENING: ICD-10-CM

## 2023-01-31 ENCOUNTER — HOSPITAL ENCOUNTER (OUTPATIENT)
Dept: LAB | Facility: MEDICAL CENTER | Age: 78
End: 2023-01-31
Attending: PHYSICIAN ASSISTANT
Payer: MEDICARE

## 2023-01-31 DIAGNOSIS — C88.0 WALDENSTROM MACROGLOBULINEMIA (HCC): ICD-10-CM

## 2023-01-31 DIAGNOSIS — Z13.220 SCREENING CHOLESTEROL LEVEL: ICD-10-CM

## 2023-01-31 DIAGNOSIS — E03.9 HYPOTHYROIDISM, UNSPECIFIED TYPE: ICD-10-CM

## 2023-01-31 DIAGNOSIS — Z12.5 PROSTATE CANCER SCREENING: ICD-10-CM

## 2023-01-31 DIAGNOSIS — R73.02 IGT (IMPAIRED GLUCOSE TOLERANCE): ICD-10-CM

## 2023-01-31 LAB
ALBUMIN SERPL BCP-MCNC: 4.5 G/DL (ref 3.2–4.9)
ALBUMIN/GLOB SERPL: 1.5 G/DL
ALP SERPL-CCNC: 63 U/L (ref 30–99)
ALT SERPL-CCNC: 10 U/L (ref 2–50)
ANION GAP SERPL CALC-SCNC: 10 MMOL/L (ref 7–16)
AST SERPL-CCNC: 17 U/L (ref 12–45)
BASOPHILS # BLD AUTO: 0.6 % (ref 0–1.8)
BASOPHILS # BLD: 0.04 K/UL (ref 0–0.12)
BILIRUB SERPL-MCNC: 0.5 MG/DL (ref 0.1–1.5)
BUN SERPL-MCNC: 15 MG/DL (ref 8–22)
CALCIUM ALBUM COR SERPL-MCNC: 9.1 MG/DL (ref 8.5–10.5)
CALCIUM SERPL-MCNC: 9.5 MG/DL (ref 8.5–10.5)
CHLORIDE SERPL-SCNC: 102 MMOL/L (ref 96–112)
CHOLEST SERPL-MCNC: 190 MG/DL (ref 100–199)
CO2 SERPL-SCNC: 26 MMOL/L (ref 20–33)
CREAT SERPL-MCNC: 0.87 MG/DL (ref 0.5–1.4)
EOSINOPHIL # BLD AUTO: 0.07 K/UL (ref 0–0.51)
EOSINOPHIL NFR BLD: 1.1 % (ref 0–6.9)
ERYTHROCYTE [DISTWIDTH] IN BLOOD BY AUTOMATED COUNT: 46.4 FL (ref 35.9–50)
EST. AVERAGE GLUCOSE BLD GHB EST-MCNC: 131 MG/DL
FASTING STATUS PATIENT QL REPORTED: NORMAL
GFR SERPLBLD CREATININE-BSD FMLA CKD-EPI: 89 ML/MIN/1.73 M 2
GLOBULIN SER CALC-MCNC: 3 G/DL (ref 1.9–3.5)
GLUCOSE SERPL-MCNC: 131 MG/DL (ref 65–99)
HBA1C MFR BLD: 6.2 % (ref 4–5.6)
HCT VFR BLD AUTO: 40.2 % (ref 42–52)
HDLC SERPL-MCNC: 70 MG/DL
HGB BLD-MCNC: 13.7 G/DL (ref 14–18)
IMM GRANULOCYTES # BLD AUTO: 0 K/UL (ref 0–0.11)
IMM GRANULOCYTES NFR BLD AUTO: 0 % (ref 0–0.9)
LDLC SERPL CALC-MCNC: 110 MG/DL
LYMPHOCYTES # BLD AUTO: 2.31 K/UL (ref 1–4.8)
LYMPHOCYTES NFR BLD: 35.8 % (ref 22–41)
MCH RBC QN AUTO: 33.5 PG (ref 27–33)
MCHC RBC AUTO-ENTMCNC: 34.1 G/DL (ref 33.7–35.3)
MCV RBC AUTO: 98.3 FL (ref 81.4–97.8)
MONOCYTES # BLD AUTO: 0.56 K/UL (ref 0–0.85)
MONOCYTES NFR BLD AUTO: 8.7 % (ref 0–13.4)
NEUTROPHILS # BLD AUTO: 3.48 K/UL (ref 1.82–7.42)
NEUTROPHILS NFR BLD: 53.8 % (ref 44–72)
NRBC # BLD AUTO: 0 K/UL
NRBC BLD-RTO: 0 /100 WBC
PLATELET # BLD AUTO: 290 K/UL (ref 164–446)
PMV BLD AUTO: 8.9 FL (ref 9–12.9)
POTASSIUM SERPL-SCNC: 4.1 MMOL/L (ref 3.6–5.5)
PROT SERPL-MCNC: 7.5 G/DL (ref 6–8.2)
PSA SERPL-MCNC: 0.23 NG/ML (ref 0–4)
RBC # BLD AUTO: 4.09 M/UL (ref 4.7–6.1)
SODIUM SERPL-SCNC: 138 MMOL/L (ref 135–145)
TRIGL SERPL-MCNC: 48 MG/DL (ref 0–149)
TSH SERPL DL<=0.005 MIU/L-ACNC: 3.01 UIU/ML (ref 0.38–5.33)
WBC # BLD AUTO: 6.5 K/UL (ref 4.8–10.8)

## 2023-01-31 PROCEDURE — 83036 HEMOGLOBIN GLYCOSYLATED A1C: CPT

## 2023-01-31 PROCEDURE — 86334 IMMUNOFIX E-PHORESIS SERUM: CPT

## 2023-01-31 PROCEDURE — 82784 ASSAY IGA/IGD/IGG/IGM EACH: CPT

## 2023-01-31 PROCEDURE — 84155 ASSAY OF PROTEIN SERUM: CPT | Mod: XU

## 2023-01-31 PROCEDURE — 84153 ASSAY OF PSA TOTAL: CPT

## 2023-01-31 PROCEDURE — 36415 COLL VENOUS BLD VENIPUNCTURE: CPT

## 2023-01-31 PROCEDURE — 80061 LIPID PANEL: CPT

## 2023-01-31 PROCEDURE — 83521 IG LIGHT CHAINS FREE EACH: CPT

## 2023-01-31 PROCEDURE — 80053 COMPREHEN METABOLIC PANEL: CPT

## 2023-01-31 PROCEDURE — 85025 COMPLETE CBC W/AUTO DIFF WBC: CPT

## 2023-01-31 PROCEDURE — 84443 ASSAY THYROID STIM HORMONE: CPT

## 2023-01-31 PROCEDURE — 84165 PROTEIN E-PHORESIS SERUM: CPT

## 2023-02-02 LAB
ALBUMIN SERPL ELPH-MCNC: 4.26 G/DL (ref 3.75–5.01)
ALPHA1 GLOB SERPL ELPH-MCNC: 0.26 G/DL (ref 0.19–0.46)
ALPHA2 GLOB SERPL ELPH-MCNC: 0.79 G/DL (ref 0.48–1.05)
B-GLOBULIN SERPL ELPH-MCNC: 0.6 G/DL (ref 0.48–1.1)
EER MONOCLONAL PROTEIN AND FLC, SERUM Q5224: ABNORMAL
GAMMA GLOB SERPL ELPH-MCNC: 1.39 G/DL (ref 0.62–1.51)
IGA SERPL-MCNC: 74 MG/DL (ref 68–408)
IGG SERPL-MCNC: 883 MG/DL (ref 768–1632)
IGM SERPL-MCNC: 1160 MG/DL (ref 35–263)
INTERPRETATION SERPL IFE-IMP: ABNORMAL
INTERPRETATION SERPL IFE-IMP: ABNORMAL
KAPPA LC FREE SER-MCNC: 36.99 MG/L (ref 3.3–19.4)
KAPPA LC FREE/LAMBDA FREE SER NEPH: 4.59 {RATIO} (ref 0.26–1.65)
LAMBDA LC FREE SERPL-MCNC: 8.06 MG/L (ref 5.71–26.3)
MONOCLONAL PROTEIN NL11656: 0.91 G/DL
PROT SERPL-MCNC: 7.3 G/DL (ref 6.3–8.2)

## 2023-02-03 ENCOUNTER — TELEMEDICINE (OUTPATIENT)
Dept: MEDICAL GROUP | Facility: IMAGING CENTER | Age: 78
End: 2023-02-03
Payer: MEDICARE

## 2023-02-03 VITALS — BODY MASS INDEX: 20.09 KG/M2 | WEIGHT: 140 LBS

## 2023-02-03 DIAGNOSIS — Z91.89 10 YEAR RISK OF MI OR STROKE 7.5% OR GREATER: ICD-10-CM

## 2023-02-03 DIAGNOSIS — E03.9 HYPOTHYROIDISM, UNSPECIFIED TYPE: ICD-10-CM

## 2023-02-03 DIAGNOSIS — R73.02 IGT (IMPAIRED GLUCOSE TOLERANCE): ICD-10-CM

## 2023-02-03 DIAGNOSIS — Z91.89 OTHER SPECIFIED PERSONAL RISK FACTORS, NOT ELSEWHERE CLASSIFIED: ICD-10-CM

## 2023-02-03 PROCEDURE — 99441 PR PHYSICIAN TELEPHONE EVALUATION 5-10 MIN: CPT | Mod: 93 | Performed by: PHYSICIAN ASSISTANT

## 2023-02-03 ASSESSMENT — PATIENT HEALTH QUESTIONNAIRE - PHQ9: CLINICAL INTERPRETATION OF PHQ2 SCORE: 0

## 2023-02-03 ASSESSMENT — FIBROSIS 4 INDEX: FIB4 SCORE: 1.41

## 2023-02-03 NOTE — PATIENT INSTRUCTIONS
It was a pleasure meeting with you today at [facility]!    Your medical history/records and medications were reviewed today.     UPDATE on MyChart Results: If you have blood work, and/or imaging studies, or any other test or procedure completed, you will have access to results as soon as they become available in MyChart. Recently, these results will be available for review at the same time that your provider is able to see results!    This will likely mean you will see a result before your provider has had a chance to review and discuss with you.  Some results or care notes may be hard to understand and may be serious in nature.    We look at every result and your provider will contact you to explain what they mean and discuss appropriate next steps. Please allow for at least 72 business hours for chart and result review.     We prefer that you wait for your care team to contact you with your results.  Often, your provider will discuss your results with you at your next appointment. We look forward to continuing to partner with you in your care.    Please review my practice information below:    If you have any prescription refill requests, please send them via [employer] or discuss with your provider at the start of your office visits. Please allow 3-5 business days for lab and testing review and you will be contacted via [employer] with those results, or if advised to make a follow up appointment regarding those results, then please do so.     Once resulted, your lab/test/imaging results will show up automatically in your MyChart. Please wait for my interpretation and recommendations prior to viewing your results to avoid any unnecessary confusion or misinterpretation. I will address all of the lab values that I interpret as abnormal and message you accordingly on your MyChart. I will always send you a message about your results even if they are normal. If you do not hear back from me within 5-7 business  days after completing your tests, then please send me a message on [employer] so I can obtain your results (especially if you went to an outside lab or imaging center - [facility], [facility], etc).     If you have any additional questions or concerns beyond my interpretation of your results, please make an appointment with me to discuss in further detail.    Please only use the [employer] messaging system for questions regarding your most recent appointment or if advised to use otherwise (glucose or blood pressure reporting).     If you have any new problems or concerns, you must make an appointment to discuss. This includes any referral requests, lab requests (unless advised to notify me for pre-appt labs), medication side effects, or request for medication adjustments.     Please arrive 15 minutes prior to your appointment time to complete your check-in and intake with the medical assistant.      Thank you,    Etta Joe PA-C ([place])  Physician Assistant Certified  [facility]    -----------------------------------------------------------------    Attn: Patients of [facility]:    In an effort to continue to provide excellent and efficient care to our patients, it is vital that we continue to use our resources appropriately. With that, this is a reminder that [employer] is used for prescription refill requests, test results, virtual visits, and chart review only.     Any new questions, concerns/conditions, lab/imaging requests, medication adjustments, new prescriptions, or referral requests do require an appointment (virtually or in person), unless discussed otherwise at your most recent appointment.     Thank you for your understanding,    [facility]

## 2023-02-03 NOTE — PROGRESS NOTES
Telephone Appointment Visit   This telephone visit was initiated by the patient and they verbally consented.    Reason for Call:  Lab Follow-up    HPI:    77-year-old male with past medical history of WaldenStrom's macroglobulinemia, hypothyroidism, IGT, presents for lab follow-up.  Originally scheduled for virtual visit, could not connect to camera.  Requested telephone encounter.  A1c slightly increased to 6.2 and mild elevation of LDL cholesterol.  States that he does eat a lot of sugar, does not keep track of added sugar in his diet.  Hematologically stable, results forwarded to Dr. Pineda, whom he will be seeing on February 28, 2023.  Thyroid function stable.  No dose adjustments needed of Synthroid.    The 10-year ASCVD risk score (Nile CHATMAN, et al., 2019) is: 25.2%    Values used to calculate the score:      Age: 77 years      Sex: Male      Is Non- : No      Diabetic: No      Tobacco smoker: No      Systolic Blood Pressure: 134 mmHg      Is BP treated: No      HDL Cholesterol: 70 mg/dL      Total Cholesterol: 190 mg/dL    Chirag still has not scheduled his calcium score scan.    Labs / Images Reviewed:   Labs reviewed with patient.    Assessment and Plan:     1. IGT (impaired glucose tolerance)  Chronic, uncontrolled.  Discussed limiting added sugar to no more than 30 g/day.  Discussed eating carbohydrates that are higher in fiber to avoid glucose and insulin spikes.  May recheck in 3 to 6 months.    2. Hypothyroidism, unspecified type  Chronic, controlled and stable. Continue current regimen.     3. 10 year risk of MI or stroke 7.5% or greater  Instructed patient to schedule calcium score scan to assess need for statin therapy.    Follow-up: 6 months or sooner if the need arises    Total Time Spent (mins): 10    Etta Joe P.A.-C.

## 2023-02-22 ENCOUNTER — HOSPITAL ENCOUNTER (OUTPATIENT)
Dept: RADIOLOGY | Facility: MEDICAL CENTER | Age: 78
End: 2023-02-22
Attending: PHYSICIAN ASSISTANT
Payer: COMMERCIAL

## 2023-02-22 DIAGNOSIS — Z91.89 OTHER SPECIFIED PERSONAL RISK FACTORS, NOT ELSEWHERE CLASSIFIED: ICD-10-CM

## 2023-02-22 PROCEDURE — 4410556 CT-CARDIAC SCORING (SELF PAY ONLY)

## 2023-02-22 NOTE — PROGRESS NOTES
This evaluation was conducted via Zoom using secure and encrypted videoconferencing technology. The patient was in their home in the [facility].    The patient's identity was confirmed and verbal consent was obtained for this virtual visit.      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 He is minimally anemic. Ca, BUN, creatinine are normal. IgM mildly elevated and stable. Viscosity has been normal. Has been told he is pre diabetic and is cutting down on sugars. Also about to start a statin. Does c/o transient neuropathy sx's but they sound unilateral and probably not related to Waldenstrom's.     ROS:    Constitutional: No weight loss  Skin: No rash or jaundice  HENT: No change in eyesight or hearing  Cardiovascular:No chest pain or arrythmia  Respiratory:No cough or SOB  GI:No nausea, vomiting, diarrhea, constipation  :No dysuria or frequency  Musculoskeletal:No bone or joint pain  Neuro:No sx's of neuropathy  Psych: No complaints    PMH:      No Known Allergies    Past Medical History:   Diagnosis Date    Cataract     alonso iol    DDD (degenerative disc disease), cervical     DDD (degenerative disc disease), cervical     DDD (degenerative disc disease), lumbar     Heart murmur     Hiatus hernia syndrome     Hyponatremia 4/13/2021    Lumbar spinal stenosis     Vitamin D deficiency 2/2/2018        Past Surgical History:   Procedure Laterality Date    AK DX BONE MARROW ASPIRATIONS Left 1/21/2022    Procedure: ASPIRATION, BONE MARROW - DR SANCHEZ;  Surgeon: Tyrel Mcintyre M.D.;  Location: SURGERY SAME DAY [facility];  Service: Orthopedics    AK DX BONE MARROW BIOPSIES Left 1/21/2022    Procedure: BIOPSY, BONE MARROW, USING NEEDLE OR TROCAR;  Surgeon: Tyrel Mcintyre M.D.;  Location: SURGERY SAME DAY [facility];  Service: Orthopedics    INGUINAL HERNIA REPAIR ROBOTIC Left 4/8/2016    Procedure: INGUINAL HERNIA  REPAIR ROBOTIC WITH MESH;  Surgeon: Dustin Maria M.D.;  Location: SURGERY SAME DAY [facility];  Service:     OTHER  2006    alonso iol    CATARACT EXTRACTION WITH IOL Bilateral     HERNIA REPAIR      bilat        Medications:    Current Outpatient Medications on File Prior to Encounter   Medication Sig Dispense Refill    levothyroxine (SYNTHROID) 50 MCG Tab Take 1 Tablet by mouth every morning on an empty stomach. 100 Tablet 3    B Complex Vitamins (B COMPLEX-B12 PO) Take  by mouth every day.      VITAMIN A PO Take  by mouth every day.      Cholecalciferol (VITAMIN D3 PO) Take  by mouth every day.      rosuvastatin (CRESTOR) 10 MG Tab Take 1 Tablet by mouth every evening. (Patient not taking: Reported on 2/24/2023) 90 Tablet 3     No current facility-administered medications on file prior to encounter.       Social History     Tobacco Use    Smoking status: Never    Smokeless tobacco: Never   Substance Use Topics    Alcohol use: Yes     Alcohol/week: 0.6 oz     Types: 1 Glasses of wine per week     Comment: 4-5/week; beer once in a while. 1/18/22: 1 drink per day.        Family History   Problem Relation Age of Onset    Cancer Mother 50        breast    Cancer Paternal Grandmother 70        colon cancer        Objective    Vitals:    There were no vitals taken for this visit.    Physical Exam:    Appears well-developed and well-nourished. No distress.    Head -  Normocephalic .   Eyes - Pupils are equal. Conjunctivae normal. No scleral icterus.   Ears - normal hearing   Neurological -   Alert and oriented.  Skin -. No rash noted. Not diaphoretic. No erythema. No pallor. No jaundice   Psychiatric -  Normal mood and affect.    Labs:     Latest Reference Range & Units 05/06/22 13:51 08/19/22 07:33 01/31/23 08:09   WBC 4.8 - 10.8 K/uL 7.1 7.0 6.5   RBC 4.70 - 6.10 M/uL 4.27 (L) 4.25 (L) 4.09 (L)   Hemoglobin 14.0 - 18.0 g/dL 14.4 14.6 13.7 (L)   Hematocrit 42.0 - 52.0 % 39.3 (L) 41.0 (L) 40.2 (L)   MCV 81.4 - 97.8 fL  92.0 96.5 98.3 (H)   MCH 27.0 - 33.0 pg 33.7 (H) 34.4 (H) 33.5 (H)   MCHC 33.7 - 35.3 g/dL 36.6 (H) 35.6 (H) 34.1   RDW 35.9 - 50.0 fL 43.4 44.9 46.4   Platelet Count 164 - 446 K/uL 284 293 290      Latest Reference Range & Units 01/31/23 08:09   Sodium 135 - 145 mmol/L 138   Potassium 3.6 - 5.5 mmol/L 4.1   Chloride 96 - 112 mmol/L 102   Co2 20 - 33 mmol/L 26   Anion Gap 7.0 - 16.0  10.0   Glucose 65 - 99 mg/dL 131 (H)   Bun 8 - 22 mg/dL 15   Creatinine 0.50 - 1.40 mg/dL 0.87   GFR (CKD-EPI) >60 mL/min/1.73 m 2 89   Calcium 8.5 - 10.5 mg/dL 9.5   Correct Calcium 8.5 - 10.5 mg/dL 9.1   AST(SGOT) 12 - 45 U/L 17   ALT(SGPT) 2 - 50 U/L 10   Alkaline Phosphatase 30 - 99 U/L 63   Total Bilirubin 0.1 - 1.5 mg/dL 0.5   Albumin 3.2 - 4.9 g/dL 4.5   Total Protein 6.0 - 8.2 g/dL 7.5   Globulin 1.9 - 3.5 g/dL 3.0   A-G Ratio g/dL 1.5      Latest Reference Range & Units 05/06/22 13:51 08/19/22 07:33 01/31/23 08:09   Immunoglobulin A 68 - 408 mg/dL 75 71 74   Immunoglobulin G 768 - 1632 mg/dL 771 793 883   Immunoglobulin M 35 - 263 mg/dL 1040 (H) 1108 (H) 1160 (H)      Latest Reference Range & Units 05/06/22 13:51 08/19/22 07:33 01/31/23 08:09   Free Kappa Light Chains 3.30 - 19.40 mg/L 31.93 (H) 37.69 (H) 36.99 (H)   Free Lambda Light Chains 5.71 - 26.30 mg/L 10.71 8.58 8.06   Kappa-Lambda Ratio 0.26 - 1.65  2.98 (H) 4.39 (H) 4.59 (H)      Latest Reference Range & Units 02/03/22 10:40   Viscosity 1.10 - 1.80 cP 1.37     Assessment    Imp:    Visit Diagnosis:    1. Waldenstrom macroglobulinemia (HCC)  VISCOSITY, SERUM    Monoclonal Protein and FLC, Serum    CBC WITH DIFFERENTIAL    Comp Metabolic Panel        Plan:  6 months with usually lab plus viscosity    Carson Pineda M.D.

## 2023-02-23 DIAGNOSIS — R93.1 ELEVATED CORONARY ARTERY CALCIUM SCORE: ICD-10-CM

## 2023-02-23 DIAGNOSIS — D22.9 NUMEROUS MOLES: ICD-10-CM

## 2023-02-23 RX ORDER — ROSUVASTATIN CALCIUM 10 MG/1
10 TABLET, COATED ORAL EVERY EVENING
Qty: 90 TABLET | Refills: 3 | Status: SHIPPED | OUTPATIENT
Start: 2023-02-23 | End: 2023-07-11 | Stop reason: SDUPTHER

## 2023-02-24 ENCOUNTER — OFFICE VISIT (OUTPATIENT)
Dept: MEDICAL GROUP | Facility: IMAGING CENTER | Age: 78
End: 2023-02-24
Payer: MEDICARE

## 2023-02-24 VITALS
WEIGHT: 140.6 LBS | OXYGEN SATURATION: 98 % | TEMPERATURE: 98.2 F | DIASTOLIC BLOOD PRESSURE: 56 MMHG | HEART RATE: 67 BPM | BODY MASS INDEX: 20.13 KG/M2 | HEIGHT: 70 IN | SYSTOLIC BLOOD PRESSURE: 118 MMHG

## 2023-02-24 DIAGNOSIS — M48.061 SPINAL STENOSIS OF LUMBAR REGION, UNSPECIFIED WHETHER NEUROGENIC CLAUDICATION PRESENT: Chronic | ICD-10-CM

## 2023-02-24 DIAGNOSIS — G31.9 CEREBRAL ATROPHY (HCC): ICD-10-CM

## 2023-02-24 DIAGNOSIS — E78.00 PURE HYPERCHOLESTEROLEMIA: ICD-10-CM

## 2023-02-24 DIAGNOSIS — C88.0 WALDENSTROM MACROGLOBULINEMIA (HCC): ICD-10-CM

## 2023-02-24 DIAGNOSIS — R93.1 ELEVATED CORONARY ARTERY CALCIUM SCORE: ICD-10-CM

## 2023-02-24 DIAGNOSIS — R73.02 IGT (IMPAIRED GLUCOSE TOLERANCE): ICD-10-CM

## 2023-02-24 PROCEDURE — 99214 OFFICE O/P EST MOD 30 MIN: CPT | Performed by: PHYSICIAN ASSISTANT

## 2023-02-24 ASSESSMENT — FIBROSIS 4 INDEX: FIB4 SCORE: 1.41

## 2023-02-24 NOTE — ASSESSMENT & PLAN NOTE
CT Cardiac Scoring 2/22/23:    Coronary calcification:  LMA - 0.0  LCX - 0.0  LAD - 363.8  RCA - 0.0  PDA - 0.0     Total Calcium Score: 363.8     Percentile: Calcium score is above the 25th percentile for the patient's age and sex.     Other findings:  Heart: Normal size.  Lungs: Clear.  Mediastinum: Normal.  Upper abdomen: Normal.    Patient denies any chest pain, exercise intolerance, shortness of breath, dizziness, syncope.  States that he plans to cut back on his red meat intake and fried foods.  Has been drinking around 3 to 4 glasses of wine per week and a sixpack of beer.  Plans to cut back on that as well.  A prescription for rosuvastatin was sent into his pharmacy, he has not started it yet.  No daily aspirin use.  No known history of ACS or TIA/CVA.

## 2023-02-24 NOTE — PATIENT INSTRUCTIONS
It was a pleasure meeting with you today at [facility]!    Your medical history/records and medications were reviewed today.     UPDATE on MyChart Results: If you have blood work, and/or imaging studies, or any other test or procedure completed, you will have access to results as soon as they become available in MyChart. Recently, these results will be available for review at the same time that your provider is able to see results!    This will likely mean you will see a result before your provider has had a chance to review and discuss with you.  Some results or care notes may be hard to understand and may be serious in nature.    We look at every result and your provider will contact you to explain what they mean and discuss appropriate next steps. Please allow for at least 72 business hours for chart and result review.     We prefer that you wait for your care team to contact you with your results.  Often, your provider will discuss your results with you at your next appointment. We look forward to continuing to partner with you in your care.    Please review my practice information below:    If you have any prescription refill requests, please send them via [employer] or discuss with your provider at the start of your office visits. Please allow 3-5 business days for lab and testing review and you will be contacted via [employer] with those results, or if advised to make a follow up appointment regarding those results, then please do so.     Once resulted, your lab/test/imaging results will show up automatically in your MyChart. Please wait for my interpretation and recommendations prior to viewing your results to avoid any unnecessary confusion or misinterpretation. I will address all of the lab values that I interpret as abnormal and message you accordingly on your MyChart. I will always send you a message about your results even if they are normal. If you do not hear back from me within 5-7 business  days after completing your tests, then please send me a message on [facility] so I can obtain your results (especially if you went to an outside lab or imaging center - [facility], [facility], etc).     If you have any additional questions or concerns beyond my interpretation of your results, please make an appointment with me to discuss in further detail.    Please only use the [facility] messaging system for questions regarding your most recent appointment or if advised to use otherwise (glucose or blood pressure reporting).     If you have any new problems or concerns, you must make an appointment to discuss. This includes any referral requests, lab requests (unless advised to notify me for pre-appt labs), medication side effects, or request for medication adjustments.     Please arrive 15 minutes prior to your appointment time to complete your check-in and intake with the medical assistant.      Thank you,    Etta Joe PA-C ([place]aker)  Physician Assistant Certified  [facility]    -----------------------------------------------------------------    Attn: Patients of [facility]:    In an effort to continue to provide excellent and efficient care to our patients, it is vital that we continue to use our resources appropriately. With that, this is a reminder that [facility] is used for prescription refill requests, test results, virtual visits, and chart review only.     Any new questions, concerns/conditions, lab/imaging requests, medication adjustments, new prescriptions, or referral requests do require an appointment (virtually or in person), unless discussed otherwise at your most recent appointment.     Thank you for your understanding,    [facility]

## 2023-02-24 NOTE — LETTER
2023        To Whom It May Concern:        Chirag Damon Jr. ( 47) is a patient under my care and due to his current medical condition I would recommend priority airline boarding.        Sincerely,          Etta Joe PA-C (Baker)  Physician Assistant Certified  [facility]                      Etta Joe P.A.-C.

## 2023-02-24 NOTE — ASSESSMENT & PLAN NOTE
Chronic, controlled stable.  Associated degenerative disc disease.  Occasional back pain and slower gait.  Requesting note for priority boarding on airplanes to allow him time to find a seat and store his luggage.

## 2023-02-24 NOTE — ASSESSMENT & PLAN NOTE
Chronic, patient denies any changes in his memory.  No significant memory loss affecting his day-to-day life.  Still working.

## 2023-02-24 NOTE — PROGRESS NOTES
Subjective:     CC:   Chief Complaint   Patient presents with    Results     Cardiac CT results       HPI:   Chirag presents today to discuss:    Elevated coronary artery calcium score  CT Cardiac Scoring 2/22/23:    Coronary calcification:  LMA - 0.0  LCX - 0.0  LAD - 363.8  RCA - 0.0  PDA - 0.0     Total Calcium Score: 363.8     Percentile: Calcium score is above the 25th percentile for the patient's age and sex.     Other findings:  Heart: Normal size.  Lungs: Clear.  Mediastinum: Normal.  Upper abdomen: Normal.    Patient denies any chest pain, exercise intolerance, shortness of breath, dizziness, syncope.  States that he plans to cut back on his red meat intake and fried foods.  Has been drinking around 3 to 4 glasses of wine per week and a sixpack of beer.  Plans to cut back on that as well.  A prescription for rosuvastatin was sent into his pharmacy, he has not started it yet.  No daily aspirin use.  No known history of ACS or TIA/CVA.    IGT (impaired glucose tolerance)  Chronic, A1c increased.  Plans to make dietary changes.    Waldenstrom macroglobulinemia (HCC)  Chronic, controlled stable.  Managed by hematology.    Cerebral atrophy (HCC)  Chronic, patient denies any changes in his memory.  No significant memory loss affecting his day-to-day life.  Still working.    Lumbar spinal stenosis  Chronic, controlled stable.  Associated degenerative disc disease.  Occasional back pain and slower gait.  Requesting note for priority boarding on airplanes to allow him time to find a seat and store his luggage.    The 10-year ASCVD risk score (Nile CHATMAN, et al., 2019) is: 20.7%    Values used to calculate the score:      Age: 77 years      Sex: Male      Is Non- : No      Diabetic: No      Tobacco smoker: No      Systolic Blood Pressure: 118 mmHg      Is BP treated: No      HDL Cholesterol: 70 mg/dL      Total Cholesterol: 190 mg/dL    Past Medical History:   Diagnosis Date    Cataract     alonso  iol    DDD (degenerative disc disease), cervical     DDD (degenerative disc disease), cervical     DDD (degenerative disc disease), lumbar     Heart murmur     Hiatus hernia syndrome     Hyponatremia 4/13/2021    Lumbar spinal stenosis     Vitamin D deficiency 2/2/2018     Family History   Problem Relation Age of Onset    Cancer Mother 50        breast    Cancer Paternal Grandmother 70        colon cancer     Past Surgical History:   Procedure Laterality Date    TX DX BONE MARROW ASPIRATIONS Left 1/21/2022    Procedure: ASPIRATION, BONE MARROW - DR SANCHEZ;  Surgeon: Tyrel Mcintyre M.D.;  Location: SURGERY SAME DAY [facility];  Service: Orthopedics    TX DX BONE MARROW BIOPSIES Left 1/21/2022    Procedure: BIOPSY, BONE MARROW, USING NEEDLE OR TROCAR;  Surgeon: Tyrel Mcintyre M.D.;  Location: SURGERY SAME DAY [facility];  Service: Orthopedics    INGUINAL HERNIA REPAIR ROBOTIC Left 4/8/2016    Procedure: INGUINAL HERNIA REPAIR ROBOTIC WITH MESH;  Surgeon: Dustin Maria M.D.;  Location: SURGERY SAME DAY [facility] ORS;  Service:     OTHER  2006    alonso iol    CATARACT EXTRACTION WITH IOL Bilateral     HERNIA REPAIR      bilat     Social History     Tobacco Use    Smoking status: Never    Smokeless tobacco: Never   Vaping Use    Vaping Use: Some days   Substance Use Topics    Alcohol use: Yes     Alcohol/week: 0.6 oz     Types: 1 Glasses of wine per week     Comment: 4-5/week; beer once in a while. 1/18/22: 1 drink per day.    Drug use: No     Social History     Social History Narrative    Not on file     Current Outpatient Medications Ordered in Epic   Medication Sig Dispense Refill    levothyroxine (SYNTHROID) 50 MCG Tab Take 1 Tablet by mouth every morning on an empty stomach. 100 Tablet 3    B Complex Vitamins (B COMPLEX-B12 PO) Take  by mouth every day.      VITAMIN A PO Take  by mouth every day.      Cholecalciferol (VITAMIN D3 PO) Take  by mouth every day.      rosuvastatin (CRESTOR) 10 MG Tab Take 1 Tablet by mouth  "every evening. (Patient not taking: Reported on 2/24/2023) 90 Tablet 3     No current Epic-ordered facility-administered medications on file.     Patient has no known allergies.    PMH/PSH/FH/Social history reviewed.  Vaccinations discussed.  Previous records and labs reviewed. Discussed age appropriate anticipatory guidance.    ROS: see hpi  Gen: no fevers/chills  Pulm: no sob, no cough  CV: no chest pain, no palpitations, no edema  GI: no nausea/vomiting, no diarrhea  Skin: no rash    Objective:   Exam:  /56 (BP Location: Left arm, Patient Position: Sitting, BP Cuff Size: Adult)   Pulse 67   Temp 36.8 °C (98.2 °F) (Temporal)   Ht 1.778 m (5' 10\")   Wt 63.8 kg (140 lb 9.6 oz)   SpO2 98%   BMI 20.17 kg/m²    Body mass index is 20.17 kg/m².    Gen: Alert and oriented, No apparent distress.  HEENT: Head atraumatic, normocephalic. Pupils equal and round.  Neck: Neck is supple without lymphadenopathy.   Lungs: Normal effort, CTA bilaterally, no wheezes, rhonchi, or rales  CV: Regular rate and rhythm. No rubs or gallops.  Ext: No clubbing, cyanosis, edema.    Assessment & Plan:     77 y.o. male with the following -     1. Elevated coronary artery calcium score  Chronic, instructed patient to start rosuvastatin 10 mg daily with co-Q10 supplement.  Side effects include myalgias.  Cardiology referral placed for completeness.  Asymptomatic.    2. Pure hypercholesterolemia  Chronic, uncontrolled.  Add rosuvastatin 10 mg daily.  - Lipid Profile; Future    3. IGT (impaired glucose tolerance)  Chronic, uncontrolled.  Recheck in 3 months. Please continue working on lifestyle modifications. This includes accumulation of 150 minutes or more of moderate-intensity activity each week as tolerated and a healthy diet that is low in saturated fat, sodium, and cholesterol, such as the mediterranean diet that is typically high in fruits, vegetables, whole grains, beans, nuts, and seeds, includes olive oil as an important source " of monounsaturated fat, DASH diet, and/or also consider a plant-based diet.  Please also increase your dietary fiber intake to 25 to 30 g a day and limit added sugar to <30 g/day.  Red meat no more than once a week.  - HEMOGLOBIN A1C; Future    4. Waldenstrom macroglobulinemia (HCC)  Chronic, controlled and stable. Continue current regimen.  Follow-up with hematology as scheduled.    5. Cerebral atrophy (HCC)  Chronic, controlled and stable.  Follow-up if memory loss progresses.    6. Spinal stenosis of lumbar region, unspecified whether neurogenic claudication present  Chronic, controlled and stable. Continue current regimen.  Note provided to patient for airplane priority boarding.    Return in about 3 months (around 5/24/2023) for Follow-up labs/tests.    Etta Joe PA-C (Baker)  Physician Assistant Certified  [facility]    Please note that this dictation was created using voice recognition software. I have made every reasonable attempt to correct obvious errors, but I expect that there are errors of grammar and possibly content that I did not discover before finalizing the note.

## 2023-02-28 ENCOUNTER — HOSPITAL ENCOUNTER (OUTPATIENT)
Dept: HEMATOLOGY ONCOLOGY | Facility: MEDICAL CENTER | Age: 78
End: 2023-02-28
Attending: INTERNAL MEDICINE
Payer: MEDICARE

## 2023-02-28 DIAGNOSIS — C88.0 WALDENSTROM MACROGLOBULINEMIA (HCC): ICD-10-CM

## 2023-02-28 PROCEDURE — 99214 OFFICE O/P EST MOD 30 MIN: CPT | Mod: 95 | Performed by: INTERNAL MEDICINE

## 2023-02-28 PROCEDURE — 99211 OFF/OP EST MAY X REQ PHY/QHP: CPT | Performed by: INTERNAL MEDICINE

## 2023-03-03 ENCOUNTER — OFFICE VISIT (OUTPATIENT)
Dept: CARDIOLOGY | Facility: MEDICAL CENTER | Age: 78
End: 2023-03-03
Attending: PHYSICIAN ASSISTANT
Payer: MEDICARE

## 2023-03-03 VITALS
BODY MASS INDEX: 19.04 KG/M2 | WEIGHT: 133 LBS | OXYGEN SATURATION: 99 % | HEIGHT: 70 IN | HEART RATE: 65 BPM | SYSTOLIC BLOOD PRESSURE: 118 MMHG | DIASTOLIC BLOOD PRESSURE: 58 MMHG | RESPIRATION RATE: 14 BRPM

## 2023-03-03 DIAGNOSIS — E78.5 DYSLIPIDEMIA: Chronic | ICD-10-CM

## 2023-03-03 DIAGNOSIS — Z00.00 ENCOUNTER FOR MEDICAL EXAMINATION TO ESTABLISH CARE: ICD-10-CM

## 2023-03-03 DIAGNOSIS — Z76.89 ESTABLISHING CARE WITH NEW DOCTOR, ENCOUNTER FOR: ICD-10-CM

## 2023-03-03 DIAGNOSIS — I70.0 ATHEROSCLEROSIS OF AORTA (HCC): Chronic | ICD-10-CM

## 2023-03-03 DIAGNOSIS — I25.84 CORONARY ARTERY CALCIFICATION: ICD-10-CM

## 2023-03-03 DIAGNOSIS — I25.10 CORONARY ARTERY CALCIFICATION: ICD-10-CM

## 2023-03-03 PROBLEM — R93.1 AGATSTON CAC SCORE 200-399: Chronic | Status: ACTIVE | Noted: 2023-02-24

## 2023-03-03 LAB — EKG IMPRESSION: NORMAL

## 2023-03-03 PROCEDURE — 99204 OFFICE O/P NEW MOD 45 MIN: CPT | Performed by: INTERNAL MEDICINE

## 2023-03-03 PROCEDURE — 93000 ELECTROCARDIOGRAM COMPLETE: CPT | Performed by: INTERNAL MEDICINE

## 2023-03-03 ASSESSMENT — ENCOUNTER SYMPTOMS
DIZZINESS: 0
SHORTNESS OF BREATH: 0
BLURRED VISION: 0
CHILLS: 0
SORE THROAT: 0
PND: 0
PALPITATIONS: 0
BRUISES/BLEEDS EASILY: 0
FALLS: 0
CLAUDICATION: 0
COUGH: 0
FOCAL WEAKNESS: 0
ABDOMINAL PAIN: 0
FEVER: 0
WEAKNESS: 0
NAUSEA: 0

## 2023-03-03 ASSESSMENT — FIBROSIS 4 INDEX: FIB4 SCORE: 1.41

## 2023-03-03 NOTE — PATIENT INSTRUCTIONS
Work on at least 1.5 - 5 hours a week of moderate exercise (typical brisk walking or similar activity)    If you have had a heart attack, stent, bypass or reduced heart strength (EF <35%): cardiac rehab may reduce your risk of dying by 13-24% and need to go to the hospital by 30% within the first year (1)    Please look into the following diets and incorporate them into your diet    LOW SALT DIET   KEEP YOUR SODIUM EQUAL TO CALORIES AND NO MORE THAN DOUBLE THE CALORIES FOR A LOW SALT DIET    Cardiosmart.org - great resource for American College of Cardiology on heart disease prevention and treatment    FOR TREATMENT OR PREVENTION OF CORONARY ARTERY DISEASE  These three programs are approved by Medicare/Insurers for those with heart disease  [facility] - [facility]  Dr. Segal's Program for Reversing Heart Disease - Swapnil Smith's Cardiologist vegetarian-based  [facility] - [place]-based mind-body Program    This is a commonly referenced Program  Dr Mancini - [employer] (book and documentary) - vegetarian-based    FOR TREATMENT OF BLOOD PRESSURE  DASH DIET - American Heart Association for treatment of HYPERTENSION    FOR TREATMENT OF BAD CHOLESTEROL/FATS  REDUCE PROCESSED SUGAR AS MUCH AS POSSIBLE  INCREASE WHOLE GRAINS/VEGETABLES  INCREASE FIBER    Lowering total cholesterol and LDL (bad) cholesterol:  - Eat leaner cuts of meat, or eliminate altogether if possible red meat, and frequently substitute fish or chicken.  - Limit saturated fat to no more than 7-10% of total calories no more than 10 g per day is recommended. Some sources of saturated fat include butter, animal fats, hydrogenated vegetable fats and oils, many desserts, whole milk dairy products.  - Replaced saturated fats with polyunsaturated fats and monounsaturated fats. Foods high in monounsaturated fat include nuts, canola oil, avocados, and olives.  - Limit trans fat (processed  foods) and replaced with fresh fruits and vegetables  - Recommend nonfat dairy products  - Increase substantially the amount of soluble fiber intake (legumes such as beans, fruit, whole grains).  - Consider nutritional supplements: plant sterile spreads such as Benecol, fish oil,  flaxseed oil, omega-3 acids capsules 1000 mg twice a day, or viscous fiber such as Metamucil  - Attain ideal weight and regular exercise (at least 30 minutes per day of moderate exercise)  ASK ABOUT STATIN OR NON STATIN MEDICATION TO REDUCE YOUR LDL AND HEART RISK    Lowering triglycerides:  - Reduce intake of simple sugar: Desserts, candy, pastries, honey, sodas, sugared cereals, yogurt, Gatorade, sports bars, canned fruit, smoothies, fruit juice, coffee drinks  - Reduced intake of refined starches: Refined Pasta, most bread  - Reduce or abstain from alcohol  - Increase omega-3 fatty acids: [place], Trout, Mackerel, Herring, Albacore tuna and supplements  - Attain ideal weight and regular exercise (at least 30 minutes per day of moderate exercise)  ASK ABOUT PURIFIED OMEGA 3 EPA or FISH OIL TO REDUCE YOUR TG AND HEART RISK    Elevating HDL (good) cholesterol:  - Increase physical activity  - Increase omega-3 fatty acids and supplements as listed above  - Incorporating appropriate amounts of monounsaturated fats such as nuts, olive oil, canola oil, avocados, olives  - Stop smoking  - Attain ideal weight and regular exercise (at least 30 minutes per day of moderate exercise)

## 2023-03-03 NOTE — PROGRESS NOTES
Chief Complaint   Patient presents with    Other     Elevated coronary artery calcium score        Subjective     Chirag Damon Jr. is a 77 y.o. male who presents today  in consultation from Etta Joe P.A.-C. for evaluation of evaluation of cardiovascular risk he had dyslipidemia with high LDL in the past this is improved over time he was HDL has been good he had a calcium score which has some technical defects as he has motion so is likely overestimated but is still better than average.            Past Medical History:   Diagnosis Date    Cataract     alonso iol    DDD (degenerative disc disease), cervical     DDD (degenerative disc disease), cervical     DDD (degenerative disc disease), lumbar     Heart murmur     Hiatus hernia syndrome     Hyponatremia 4/13/2021    Lumbar spinal stenosis     Vitamin D deficiency 2/2/2018     Past Surgical History:   Procedure Laterality Date    ME DX BONE MARROW ASPIRATIONS Left 1/21/2022    Procedure: ASPIRATION, BONE MARROW - DR SANCHEZ;  Surgeon: Tyrel Mcintyre M.D.;  Location: SURGERY SAME DAY [facility];  Service: Orthopedics    ME DX BONE MARROW BIOPSIES Left 1/21/2022    Procedure: BIOPSY, BONE MARROW, USING NEEDLE OR TROCAR;  Surgeon: Tyrel Mcintyre M.D.;  Location: SURGERY SAME DAY [facility];  Service: Orthopedics    INGUINAL HERNIA REPAIR ROBOTIC Left 4/8/2016    Procedure: INGUINAL HERNIA REPAIR ROBOTIC WITH MESH;  Surgeon: Dustin Maria M.D.;  Location: SURGERY SAME DAY [facility];  Service:     OTHER  2006    alonso iol    CATARACT EXTRACTION WITH IOL Bilateral     HERNIA REPAIR      bilat     Family History   Problem Relation Age of Onset    Cancer Mother 50        breast    Cancer Paternal Grandmother 70        colon cancer     Social History     Socioeconomic History    Marital status:      Spouse name: Not on file    Number of children: Not on file    Years of education: Not on file    Highest education level: Not on file   Occupational History    Not on  file   Tobacco Use    Smoking status: Never    Smokeless tobacco: Never   Vaping Use    Vaping Use: Some days   Substance and Sexual Activity    Alcohol use: Yes     Alcohol/week: 0.6 oz     Types: 1 Glasses of wine per week     Comment: 4-5/week; beer once in a while. 1/18/22: 1 drink per day.    Drug use: No    Sexual activity: Yes     Comment: , 2 sons,    Other Topics Concern    Not on file   Social History Narrative    Not on file     Social Determinants of Health     Financial Resource Strain: Not on file   Food Insecurity: Not on file   Transportation Needs: Not on file   Physical Activity: Not on file   Stress: Not on file   Social Connections: Not on file   Intimate Partner Violence: Not on file   Housing Stability: Not on file     No Known Allergies  Outpatient Encounter Medications as of 3/3/2023   Medication Sig Dispense Refill    Coenzyme Q10 300 MG Cap Take 1 Capsule by mouth every day. 30 Capsule     levothyroxine (SYNTHROID) 50 MCG Tab Take 1 Tablet by mouth every morning on an empty stomach. 100 Tablet 3    B Complex Vitamins (B COMPLEX-B12 PO) Take  by mouth every day.      VITAMIN A PO Take  by mouth every day.      Cholecalciferol (VITAMIN D3 PO) Take  by mouth every day.      rosuvastatin (CRESTOR) 10 MG Tab Take 1 Tablet by mouth every evening. (Patient not taking: Reported on 2/24/2023) 90 Tablet 3     No facility-administered encounter medications on file as of 3/3/2023.     Review of Systems   Constitutional:  Negative for chills and fever.   HENT:  Negative for sore throat.    Eyes:  Negative for blurred vision.   Respiratory:  Negative for cough and shortness of breath.    Cardiovascular:  Negative for chest pain, palpitations, claudication, leg swelling and PND.   Gastrointestinal:  Negative for abdominal pain and nausea.   Musculoskeletal:  Negative for falls and joint pain.   Skin:  Negative for rash.   Neurological:  Negative for dizziness, focal weakness and weakness.  "  Endo/Heme/Allergies:  Does not bruise/bleed easily.            Objective     /58 (BP Location: Left arm, Patient Position: Sitting, BP Cuff Size: Adult)   Pulse 65   Resp 14   Ht 1.778 m (5' 10\")   Wt 60.3 kg (133 lb)   SpO2 99%   BMI 19.08 kg/m²     Physical Exam  Constitutional:       General: He is not in acute distress.     Appearance: He is not diaphoretic.   HENT:      Mouth/Throat:      Comments: Wearing a mask for COVID protocol  Eyes:      General: No scleral icterus.  Neck:      Vascular: No JVD.   Cardiovascular:      Rate and Rhythm: Normal rate.      Heart sounds: Normal heart sounds. No murmur heard.    No friction rub. No gallop.   Pulmonary:      Effort: No respiratory distress.      Breath sounds: No wheezing or rales.   Abdominal:      General: Bowel sounds are normal.      Palpations: Abdomen is soft.   Musculoskeletal:      Right lower leg: No edema.      Left lower leg: No edema.   Skin:     Findings: No rash.   Neurological:      Mental Status: He is alert. Mental status is at baseline.   Psychiatric:         Mood and Affect: Mood normal.          We reviewed in person the most recent labs  Recent Results (from the past 5040 hour(s))   Monoclonal Protein and FLC, Serum    Collection Time: 08/19/22  7:33 AM   Result Value Ref Range    Albumin 4.32 3.75 - 5.01 g/dL    Alpha-1 Globulin 0.34 0.19 - 0.46 g/dL    Alpha-2 Globulin 0.94 0.48 - 1.05 g/dL    Beta Globulin 0.65 0.48 - 1.10 g/dL    Gamma Globulin 1.46 0.62 - 1.51 g/dL    Immunofixation MARJAN Done     Immunoglobulin G 793 768 - 1632 mg/dL    Immunoglobulin A 71 68 - 408 mg/dL    Immunoglobulin M 1108 (H) 35 - 263 mg/dL    Total Protein, Serum 7.7 6.3 - 8.2 g/dL    Free Kappa Light Chains 37.69 (H) 3.30 - 19.40 mg/L    Free Lambda Light Chains 8.58 5.71 - 26.30 mg/L    Kappa-Lambda Ratio 4.39 (H) 0.26 - 1.65    Interpretation See Note     EER Monoclonal Protein and FLC, Serum See Note    LDH    Collection Time: 08/19/22  7:33 AM "   Result Value Ref Range    LDH Total 141 107 - 266 U/L   Comp Metabolic Panel    Collection Time: 08/19/22  7:33 AM   Result Value Ref Range    Sodium 137 135 - 145 mmol/L    Potassium 4.3 3.6 - 5.5 mmol/L    Chloride 101 96 - 112 mmol/L    Co2 25 20 - 33 mmol/L    Anion Gap 11.0 7.0 - 16.0    Glucose 130 (H) 65 - 99 mg/dL    Bun 9 8 - 22 mg/dL    Creatinine 0.85 0.50 - 1.40 mg/dL    Calcium 9.6 8.5 - 10.5 mg/dL    AST(SGOT) 15 12 - 45 U/L    ALT(SGPT) 10 2 - 50 U/L    Alkaline Phosphatase 62 30 - 99 U/L    Total Bilirubin 0.6 0.1 - 1.5 mg/dL    Albumin 4.7 3.2 - 4.9 g/dL    Total Protein 7.9 6.0 - 8.2 g/dL    Globulin 3.2 1.9 - 3.5 g/dL    A-G Ratio 1.5 g/dL   CBC WITH DIFFERENTIAL    Collection Time: 08/19/22  7:33 AM   Result Value Ref Range    WBC 7.0 4.8 - 10.8 K/uL    RBC 4.25 (L) 4.70 - 6.10 M/uL    Hemoglobin 14.6 14.0 - 18.0 g/dL    Hematocrit 41.0 (L) 42.0 - 52.0 %    MCV 96.5 81.4 - 97.8 fL    MCH 34.4 (H) 27.0 - 33.0 pg    MCHC 35.6 (H) 33.7 - 35.3 g/dL    RDW 44.9 35.9 - 50.0 fL    Platelet Count 293 164 - 446 K/uL    MPV 8.4 (L) 9.0 - 12.9 fL    Neutrophils-Polys 55.50 44.00 - 72.00 %    Lymphocytes 33.60 22.00 - 41.00 %    Monocytes 8.60 0.00 - 13.40 %    Eosinophils 1.60 0.00 - 6.90 %    Basophils 0.40 0.00 - 1.80 %    Immature Granulocytes 0.30 0.00 - 0.90 %    Nucleated RBC 0.00 /100 WBC    Neutrophils (Absolute) 3.86 1.82 - 7.42 K/uL    Lymphs (Absolute) 2.34 1.00 - 4.80 K/uL    Monos (Absolute) 0.60 0.00 - 0.85 K/uL    Eos (Absolute) 0.11 0.00 - 0.51 K/uL    Baso (Absolute) 0.03 0.00 - 0.12 K/uL    Immature Granulocytes (abs) 0.02 0.00 - 0.11 K/uL    NRBC (Absolute) 0.00 K/uL   ESTIMATED GFR    Collection Time: 08/19/22  7:33 AM   Result Value Ref Range    GFR (CKD-EPI) 90 >60 mL/min/1.73 m 2   HEMOGLOBIN A1C    Collection Time: 08/19/22  7:35 AM   Result Value Ref Range    Glycohemoglobin 5.9 (H) 4.0 - 5.6 %    Est Avg Glucose 123 mg/dL   TSH WITH REFLEX TO FT4    Collection Time: 08/19/22   7:35 AM   Result Value Ref Range    TSH 4.090 0.380 - 5.330 uIU/mL   CORTISOL    Collection Time: 08/19/22  7:35 AM   Result Value Ref Range    Cortisol 13.2 0.0 - 23.0 ug/dL   URINE SODIUM RANDOM    Collection Time: 08/19/22  7:35 AM   Result Value Ref Range    Sodium, Urine -per volume 96 mmol/L   URIC ACID    Collection Time: 08/19/22  7:35 AM   Result Value Ref Range    Uric Acid 4.6 2.5 - 8.3 mg/dL   OSMOLALITY URINE    Collection Time: 08/19/22  7:35 AM   Result Value Ref Range    Osmolality Urine 548 300 - 900 mOsm/kg H2O   OSMOLALITY SERUM    Collection Time: 08/19/22  7:35 AM   Result Value Ref Range    Osmolality Serum 287 278 - 298 mOsm/kg H2O   Lipid Profile    Collection Time: 01/31/23  8:09 AM   Result Value Ref Range    Cholesterol,Tot 190 100 - 199 mg/dL    Triglycerides 48 0 - 149 mg/dL    HDL 70 >=40 mg/dL     (H) <100 mg/dL   PROSTATE SPECIFIC AG SCREENING    Collection Time: 01/31/23  8:09 AM   Result Value Ref Range    Prostatic Specific Antigen Tot 0.23 0.00 - 4.00 ng/mL   TSH WITH REFLEX TO FT4    Collection Time: 01/31/23  8:09 AM   Result Value Ref Range    TSH 3.010 0.380 - 5.330 uIU/mL   HEMOGLOBIN A1C    Collection Time: 01/31/23  8:09 AM   Result Value Ref Range    Glycohemoglobin 6.2 (H) 4.0 - 5.6 %    Est Avg Glucose 131 mg/dL   Comp Metabolic Panel    Collection Time: 01/31/23  8:09 AM   Result Value Ref Range    Sodium 138 135 - 145 mmol/L    Potassium 4.1 3.6 - 5.5 mmol/L    Chloride 102 96 - 112 mmol/L    Co2 26 20 - 33 mmol/L    Anion Gap 10.0 7.0 - 16.0    Glucose 131 (H) 65 - 99 mg/dL    Bun 15 8 - 22 mg/dL    Creatinine 0.87 0.50 - 1.40 mg/dL    Calcium 9.5 8.5 - 10.5 mg/dL    AST(SGOT) 17 12 - 45 U/L    ALT(SGPT) 10 2 - 50 U/L    Alkaline Phosphatase 63 30 - 99 U/L    Total Bilirubin 0.5 0.1 - 1.5 mg/dL    Albumin 4.5 3.2 - 4.9 g/dL    Total Protein 7.5 6.0 - 8.2 g/dL    Globulin 3.0 1.9 - 3.5 g/dL    A-G Ratio 1.5 g/dL   CBC WITH DIFFERENTIAL    Collection Time:  01/31/23  8:09 AM   Result Value Ref Range    WBC 6.5 4.8 - 10.8 K/uL    RBC 4.09 (L) 4.70 - 6.10 M/uL    Hemoglobin 13.7 (L) 14.0 - 18.0 g/dL    Hematocrit 40.2 (L) 42.0 - 52.0 %    MCV 98.3 (H) 81.4 - 97.8 fL    MCH 33.5 (H) 27.0 - 33.0 pg    MCHC 34.1 33.7 - 35.3 g/dL    RDW 46.4 35.9 - 50.0 fL    Platelet Count 290 164 - 446 K/uL    MPV 8.9 (L) 9.0 - 12.9 fL    Neutrophils-Polys 53.80 44.00 - 72.00 %    Lymphocytes 35.80 22.00 - 41.00 %    Monocytes 8.70 0.00 - 13.40 %    Eosinophils 1.10 0.00 - 6.90 %    Basophils 0.60 0.00 - 1.80 %    Immature Granulocytes 0.00 0.00 - 0.90 %    Nucleated RBC 0.00 /100 WBC    Neutrophils (Absolute) 3.48 1.82 - 7.42 K/uL    Lymphs (Absolute) 2.31 1.00 - 4.80 K/uL    Monos (Absolute) 0.56 0.00 - 0.85 K/uL    Eos (Absolute) 0.07 0.00 - 0.51 K/uL    Baso (Absolute) 0.04 0.00 - 0.12 K/uL    Immature Granulocytes (abs) 0.00 0.00 - 0.11 K/uL    NRBC (Absolute) 0.00 K/uL   Monoclonal Protein and FLC, Serum    Collection Time: 01/31/23  8:09 AM   Result Value Ref Range    Albumin 4.26 3.75 - 5.01 g/dL    Alpha-1 Globulin 0.26 0.19 - 0.46 g/dL    Alpha-2 Globulin 0.79 0.48 - 1.05 g/dL    Beta Globulin 0.60 0.48 - 1.10 g/dL    Gamma Globulin 1.39 0.62 - 1.51 g/dL    Immunofixation MARJAN Done     Immunoglobulin G 883 768 - 1632 mg/dL    Immunoglobulin A 74 68 - 408 mg/dL    Immunoglobulin M 1160 (H) 35 - 263 mg/dL    Monoclonal Protein 0.91 g/dL    Total Protein, Serum 7.3 6.3 - 8.2 g/dL    Free Kappa Light Chains 36.99 (H) 3.30 - 19.40 mg/L    Free Lambda Light Chains 8.06 5.71 - 26.30 mg/L    Kappa-Lambda Ratio 4.59 (H) 0.26 - 1.65    Interpretation See Note     EER Monoclonal Protein and FLC, Serum See Note    FASTING STATUS    Collection Time: 01/31/23  8:09 AM   Result Value Ref Range    Fasting Status Fasting    CORRECTED CALCIUM    Collection Time: 01/31/23  8:09 AM   Result Value Ref Range    Correct Calcium 9.1 8.5 - 10.5 mg/dL   ESTIMATED GFR    Collection Time: 01/31/23  8:09 AM    Result Value Ref Range    GFR (CKD-EPI) 89 >60 mL/min/1.73 m 2   EKG    Collection Time: 23  9:41 AM   Result Value Ref Range    Report       [facility]    Test Date:  2023  Pt Name:    ISABELLA DAMON                 Department: [facility]  MRN:        [id number]                      Room:  Gender:     Male                         Technician: KS  :        1945                   Requested By:JO MCKEON  Order #:    [id number]                    Reading MD:    Measurements  Intervals                                Axis  Rate:       60                           P:          84  NH:         172                          QRS:        87  QRSD:       92                           T:          76  QT:         402  QTc:        402    Interpretive Statements  Sinus rhythm  Consider left atrial enlargement  Borderline right axis deviation  Minimal ST elevation, anterior leads  Compared to ECG 2022 09:08:52  Sinus bradycardia no longer present  ST (T wave) deviation still present       We reviewed the images and report of his calcium score    Assessment & Plan     1. Encounter for medical examination to establish care        2. Establishing care with new doctor, encounter for        3. Coronary artery calcification  EKG      4. Dyslipidemia        5. Atherosclerosis of aorta (HCC)            Medical Decision Making: Today's Assessment/Status/Plan:          It was my pleasure to meet with Mr. Damon.    We addressed the management of hypertension at today's visit. Blood pressure is well controlled.  We specifically assessed the labs on hypertension treatment    We addressed the management of dyslipidemia and atherosclerosis at today's visit. He is on appropriate statin.    Mr. Damon does not require regular cardiology follow up, I have advised him to call our office or e-mail using my MyChart if needed.    It is my pleasure to participate in the care of Mr. Damon.  Please do not  hesitate to contact me with questions or concerns.    Jem Bocanegra MD PhD [facility]  Cardiologist [facility]    Please note that this dictation was created using voice recognition software. There may be errors I did not discover before finalizing the note.

## 2023-05-01 DIAGNOSIS — E03.9 HYPOTHYROIDISM, UNSPECIFIED TYPE: ICD-10-CM

## 2023-05-01 RX ORDER — LEVOTHYROXINE SODIUM 0.05 MG/1
50 TABLET ORAL
Qty: 100 TABLET | Refills: 3 | Status: SHIPPED | OUTPATIENT
Start: 2023-05-01 | End: 2023-08-24 | Stop reason: SDUPTHER

## 2023-05-31 ENCOUNTER — TELEPHONE (OUTPATIENT)
Dept: HEALTH INFORMATION MANAGEMENT | Facility: OTHER | Age: 78
End: 2023-05-31

## 2023-06-20 DIAGNOSIS — Z12.83 SCREENING FOR SKIN CANCER: ICD-10-CM

## 2023-06-20 NOTE — PROGRESS NOTES
1. Caller Name: Chirag Damon Jr.                         Call Back Number: [phone] (home) [phone] (work)       How would the patient prefer to be contacted with a response: Phone call do NOT leave a detailed message    2. SPECIFIC Action To Be Taken: Orders pending, please sign.    3. Diagnosis/Clinical Reason for Request: Screening for skin cancer     4. Specialty & Provider Name/Lab/Imaging Location: Dermatology     5. Is appointment scheduled for requested order/referral: no    Patient was informed they will receive a return phone call from the office ONLY if there are any questions before processing their request. Advised to call back if they haven't received a call from the referral department in 5 days.

## 2023-06-30 ENCOUNTER — HOSPITAL ENCOUNTER (OUTPATIENT)
Dept: LAB | Facility: MEDICAL CENTER | Age: 78
End: 2023-06-30
Attending: PHYSICIAN ASSISTANT
Payer: MEDICARE

## 2023-06-30 DIAGNOSIS — R73.02 IGT (IMPAIRED GLUCOSE TOLERANCE): ICD-10-CM

## 2023-06-30 DIAGNOSIS — E78.00 PURE HYPERCHOLESTEROLEMIA: ICD-10-CM

## 2023-06-30 DIAGNOSIS — C88.0 WALDENSTROM MACROGLOBULINEMIA (HCC): ICD-10-CM

## 2023-06-30 DIAGNOSIS — E03.9 HYPOTHYROIDISM, UNSPECIFIED TYPE: ICD-10-CM

## 2023-06-30 LAB
ALBUMIN SERPL BCP-MCNC: 4.5 G/DL (ref 3.2–4.9)
ALBUMIN/GLOB SERPL: 1.6 G/DL
ALP SERPL-CCNC: 55 U/L (ref 30–99)
ALT SERPL-CCNC: 16 U/L (ref 2–50)
ANION GAP SERPL CALC-SCNC: 14 MMOL/L (ref 7–16)
AST SERPL-CCNC: 19 U/L (ref 12–45)
BASOPHILS # BLD AUTO: 0.5 % (ref 0–1.8)
BASOPHILS # BLD: 0.03 K/UL (ref 0–0.12)
BILIRUB SERPL-MCNC: 0.4 MG/DL (ref 0.1–1.5)
BUN SERPL-MCNC: 14 MG/DL (ref 8–22)
CALCIUM ALBUM COR SERPL-MCNC: 9.1 MG/DL (ref 8.5–10.5)
CALCIUM SERPL-MCNC: 9.5 MG/DL (ref 8.5–10.5)
CHLORIDE SERPL-SCNC: 100 MMOL/L (ref 96–112)
CHOLEST SERPL-MCNC: 128 MG/DL (ref 100–199)
CO2 SERPL-SCNC: 23 MMOL/L (ref 20–33)
CREAT SERPL-MCNC: 0.84 MG/DL (ref 0.5–1.4)
EOSINOPHIL # BLD AUTO: 0.12 K/UL (ref 0–0.51)
EOSINOPHIL NFR BLD: 2 % (ref 0–6.9)
ERYTHROCYTE [DISTWIDTH] IN BLOOD BY AUTOMATED COUNT: 43.9 FL (ref 35.9–50)
EST. AVERAGE GLUCOSE BLD GHB EST-MCNC: 134 MG/DL
FASTING STATUS PATIENT QL REPORTED: NORMAL
GFR SERPLBLD CREATININE-BSD FMLA CKD-EPI: 90 ML/MIN/1.73 M 2
GLOBULIN SER CALC-MCNC: 2.8 G/DL (ref 1.9–3.5)
GLUCOSE SERPL-MCNC: 120 MG/DL (ref 65–99)
HBA1C MFR BLD: 6.3 % (ref 4–5.6)
HCT VFR BLD AUTO: 39.9 % (ref 42–52)
HDLC SERPL-MCNC: 74 MG/DL
HGB BLD-MCNC: 13.6 G/DL (ref 14–18)
IMM GRANULOCYTES # BLD AUTO: 0.01 K/UL (ref 0–0.11)
IMM GRANULOCYTES NFR BLD AUTO: 0.2 % (ref 0–0.9)
LDLC SERPL CALC-MCNC: 46 MG/DL
LYMPHOCYTES # BLD AUTO: 2.33 K/UL (ref 1–4.8)
LYMPHOCYTES NFR BLD: 39 % (ref 22–41)
MCH RBC QN AUTO: 32.9 PG (ref 27–33)
MCHC RBC AUTO-ENTMCNC: 34.1 G/DL (ref 32.3–36.5)
MCV RBC AUTO: 96.4 FL (ref 81.4–97.8)
MONOCYTES # BLD AUTO: 0.66 K/UL (ref 0–0.85)
MONOCYTES NFR BLD AUTO: 11 % (ref 0–13.4)
NEUTROPHILS # BLD AUTO: 2.83 K/UL (ref 1.82–7.42)
NEUTROPHILS NFR BLD: 47.3 % (ref 44–72)
NRBC # BLD AUTO: 0 K/UL
NRBC BLD-RTO: 0 /100 WBC (ref 0–0.2)
PLATELET # BLD AUTO: 281 K/UL (ref 164–446)
PMV BLD AUTO: 8.1 FL (ref 9–12.9)
POTASSIUM SERPL-SCNC: 4.5 MMOL/L (ref 3.6–5.5)
PROT SERPL-MCNC: 7.3 G/DL (ref 6–8.2)
RBC # BLD AUTO: 4.14 M/UL (ref 4.7–6.1)
SODIUM SERPL-SCNC: 137 MMOL/L (ref 135–145)
TRIGL SERPL-MCNC: 42 MG/DL (ref 0–149)
TSH SERPL DL<=0.005 MIU/L-ACNC: 3.56 UIU/ML (ref 0.38–5.33)
WBC # BLD AUTO: 6 K/UL (ref 4.8–10.8)

## 2023-06-30 PROCEDURE — 36415 COLL VENOUS BLD VENIPUNCTURE: CPT

## 2023-06-30 PROCEDURE — 83036 HEMOGLOBIN GLYCOSYLATED A1C: CPT

## 2023-06-30 PROCEDURE — 80053 COMPREHEN METABOLIC PANEL: CPT

## 2023-06-30 PROCEDURE — 85025 COMPLETE CBC W/AUTO DIFF WBC: CPT

## 2023-06-30 PROCEDURE — 80061 LIPID PANEL: CPT

## 2023-06-30 PROCEDURE — 84443 ASSAY THYROID STIM HORMONE: CPT

## 2023-07-11 ENCOUNTER — OFFICE VISIT (OUTPATIENT)
Dept: MEDICAL GROUP | Facility: IMAGING CENTER | Age: 78
End: 2023-07-11
Payer: MEDICARE

## 2023-07-11 VITALS
SYSTOLIC BLOOD PRESSURE: 122 MMHG | OXYGEN SATURATION: 100 % | DIASTOLIC BLOOD PRESSURE: 76 MMHG | HEART RATE: 66 BPM | TEMPERATURE: 98.5 F | RESPIRATION RATE: 16 BRPM | BODY MASS INDEX: 19.76 KG/M2 | WEIGHT: 138 LBS | HEIGHT: 70 IN

## 2023-07-11 DIAGNOSIS — E03.9 HYPOTHYROIDISM, UNSPECIFIED TYPE: ICD-10-CM

## 2023-07-11 DIAGNOSIS — R73.02 IGT (IMPAIRED GLUCOSE TOLERANCE): ICD-10-CM

## 2023-07-11 DIAGNOSIS — I70.0 ATHEROSCLEROSIS OF AORTA (HCC): Chronic | ICD-10-CM

## 2023-07-11 PROCEDURE — 3078F DIAST BP <80 MM HG: CPT | Performed by: PHYSICIAN ASSISTANT

## 2023-07-11 PROCEDURE — 1126F AMNT PAIN NOTED NONE PRSNT: CPT | Performed by: PHYSICIAN ASSISTANT

## 2023-07-11 PROCEDURE — 99214 OFFICE O/P EST MOD 30 MIN: CPT | Performed by: PHYSICIAN ASSISTANT

## 2023-07-11 PROCEDURE — 3074F SYST BP LT 130 MM HG: CPT | Performed by: PHYSICIAN ASSISTANT

## 2023-07-11 RX ORDER — ROSUVASTATIN CALCIUM 10 MG/1
10 TABLET, COATED ORAL EVERY EVENING
Qty: 90 TABLET | Refills: 3
Start: 2023-07-11 | End: 2023-07-27 | Stop reason: SDUPTHER

## 2023-07-11 RX ORDER — LEVOTHYROXINE SODIUM 0.03 MG/1
TABLET ORAL
Qty: 12 TABLET | Refills: 3 | Status: SHIPPED | OUTPATIENT
Start: 2023-07-11

## 2023-07-11 ASSESSMENT — PAIN SCALES - GENERAL: PAINLEVEL: NO PAIN

## 2023-07-11 ASSESSMENT — FIBROSIS 4 INDEX: FIB4 SCORE: 1.28

## 2023-07-11 NOTE — PATIENT INSTRUCTIONS
Please continue working on lifestyle modifications. This includes accumulation of 150 minutes or more of moderate-intensity activity each week as tolerated and a healthy diet that is low in saturated fat, sodium, and cholesterol, such as the mediterranean diet that is typically high in fruits, vegetables, whole grains, beans, nuts, and seeds, includes olive oil as an important source of monounsaturated fat, DASH diet, and/or also consider a plant-based diet.  Please also increase your dietary fiber intake to 25 to 30 g a day and limit added sugar to <30 g/day.  In regards to alcohol intake, the 2020 to 2025 Dietary Guidelines for Americans advise no more than two drinks per day for males and one drink per day for nonpregnant females, although complete cessation would be ideal to reduce risks of long term health effects from alcohol. Please eat protein with every meal.

## 2023-07-11 NOTE — ASSESSMENT & PLAN NOTE
Chronic, significant improvement in cholesterol since being on Crestor 10 mg daily.  No side effects.

## 2023-07-11 NOTE — ASSESSMENT & PLAN NOTE
Chronic, on Synthroid 50 mcg daily.  TSH levels ranging between 3-4.5.  Admits to cold intolerance, fatigue.

## 2023-07-11 NOTE — ASSESSMENT & PLAN NOTE
Latest Reference Range & Units 01/31/23 08:09 06/30/23 07:18   Glycohemoglobin 4.0 - 5.6 % 6.2 (H) 6.3 (H)     Chronic, noted increase in A1c.  Patient states that he typically eats shredded wheat in the mornings, eats pasta 3 days/week.  Does not eat protein with every meal.  Does not drink fruit juices or soda pop.  Has up to 1 beer per day.

## 2023-07-11 NOTE — PROGRESS NOTES
Subjective:     CC:   Chief Complaint   Patient presents with    Follow-Up     On lab results        HPI:   Chirag presents today to discuss:    IGT (impaired glucose tolerance)   Latest Reference Range & Units 01/31/23 08:09 06/30/23 07:18   Glycohemoglobin 4.0 - 5.6 % 6.2 (H) 6.3 (H)     Chronic, noted increase in A1c.  Patient states that he typically eats shredded wheat in the mornings, eats pasta 3 days/week.  Does not eat protein with every meal.  Does not drink fruit juices or soda pop.  Has up to 1 beer per day.    Hypothyroidism  Chronic, on Synthroid 50 mcg daily.  TSH levels ranging between 3-4.5.  Admits to cold intolerance, fatigue.    Atherosclerosis of aorta (HCC)  Chronic, significant improvement in cholesterol since being on Crestor 10 mg daily.  No side effects.      Past Medical History:   Diagnosis Date    Cataract     alonso iol    DDD (degenerative disc disease), cervical     DDD (degenerative disc disease), cervical     DDD (degenerative disc disease), lumbar     Dyslipidemia     Heart murmur     Hiatus hernia syndrome     Hyponatremia 4/13/2021    Lumbar spinal stenosis     Vitamin D deficiency 2/2/2018     Family History   Problem Relation Age of Onset    Cancer Mother 50        breast    Cancer Paternal Grandmother 70        colon cancer     Past Surgical History:   Procedure Laterality Date    OR DX BONE MARROW ASPIRATIONS Left 1/21/2022    Procedure: ASPIRATION, BONE MARROW - DR SANCHEZ;  Surgeon: Tyrel Mcintyre M.D.;  Location: SURGERY SAME DAY [facility];  Service: Orthopedics    OR DX BONE MARROW BIOPSIES Left 1/21/2022    Procedure: BIOPSY, BONE MARROW, USING NEEDLE OR TROCAR;  Surgeon: Tyrel Mcintyre M.D.;  Location: SURGERY SAME DAY [facility];  Service: Orthopedics    INGUINAL HERNIA REPAIR ROBOTIC Left 4/8/2016    Procedure: INGUINAL HERNIA REPAIR ROBOTIC WITH MESH;  Surgeon: Dustin Maria M.D.;  Location: SURGERY SAME DAY [facility];  Service:     OTHER  2006    alonso iol    CATARACT  "EXTRACTION WITH IOL Bilateral     HERNIA REPAIR      bilat     Social History     Tobacco Use    Smoking status: Never    Smokeless tobacco: Never   Vaping Use    Vaping Use: Some days   Substance Use Topics    Alcohol use: Yes     Alcohol/week: 0.6 oz     Types: 1 Glasses of wine per week     Comment: 4-5/week; beer once in a while. 1/18/22: 1 drink per day.    Drug use: No     Social History     Social History Narrative    Not on file     Current Outpatient Medications Ordered in Epic   Medication Sig Dispense Refill    levothyroxine (SYNTHROID) 25 MCG Tab Take on Sundays with 50 mcg tablet. 12 Tablet 3    rosuvastatin (CRESTOR) 10 MG Tab Take 1 Tablet by mouth every evening. 90 Tablet 3    levothyroxine (SYNTHROID) 50 MCG Tab Take 1 Tablet by mouth every morning on an empty stomach. 100 Tablet 3    Coenzyme Q10 300 MG Cap Take 1 Capsule by mouth every day. 30 Capsule     B Complex Vitamins (B COMPLEX-B12 PO) Take  by mouth every day.      VITAMIN A PO Take  by mouth every day.      Cholecalciferol (VITAMIN D3 PO) Take  by mouth every day.       No current [facility]-ordered facility-administered medications on file.     Patient has no known allergies.    PMH/PSH/FH/Social history reviewed.  Vaccinations discussed.  Previous records and labs reviewed. Discussed age appropriate anticipatory guidance.    ROS: see hpi  Gen: no fevers/chills  Pulm: no sob, no cough  CV: no chest pain, no palpitations, no edema  GI: no nausea/vomiting, no diarrhea  Skin: no rash    Objective:   Exam:  /76 (BP Location: Left arm, Patient Position: Sitting, BP Cuff Size: Adult)   Pulse 66   Temp 36.9 °C (98.5 °F) (Temporal)   Resp 16   Ht 1.778 m (5' 10\")   Wt 62.6 kg (138 lb)   SpO2 100%   BMI 19.80 kg/m²    Body mass index is 19.8 kg/m².    Gen: Alert and oriented, No apparent distress.  HEENT: Head atraumatic, normocephalic. Pupils equal and round.  Neck: Neck is supple without lymphadenopathy.   Lungs: Normal effort, CTA " bilaterally, no wheezes, rhonchi, or rales  CV: Regular rate and rhythm. No murmurs, rubs, or gallops.  Ext: No clubbing, cyanosis, edema.    Assessment & Plan:     77 y.o. male with the following -     1. IGT (impaired glucose tolerance)  Chronic, increase in A1c noted. Please continue working on lifestyle modifications. This includes accumulation of 150 minutes or more of moderate-intensity activity each week as tolerated and a healthy diet that is low in saturated fat, sodium, and cholesterol, such as the mediterranean diet that is typically high in fruits, vegetables, whole grains, beans, nuts, and seeds, includes olive oil as an important source of monounsaturated fat, DASH diet, and/or also consider a plant-based diet.  Please also increase your dietary fiber intake to 25 to 30 g a day and limit added sugar to <30 g/day.  In regards to alcohol intake, the 2020 to 2025 Dietary Guidelines for Americans advise no more than two drinks per day for males and one drink per day for nonpregnant females, although complete cessation would be ideal to reduce risks of long term health effects from alcohol.  Protein with every meal.  Change pasta to higher protein brands and alternate cereal with oatmeal with an egg.    2. Hypothyroidism, unspecified type  Chronic, borderline TSH, will add additional 25 mcg tablet to take on Sundays.  We will recheck thyroid function in 6 weeks.  - levothyroxine (SYNTHROID) 25 MCG Tab; Take on Sundays with 50 mcg tablet.  Dispense: 12 Tablet; Refill: 3    3. Atherosclerosis of aorta (HCC)  Chronic, controlled and stable. Continue current regimen -   - rosuvastatin (CRESTOR) 10 MG Tab; Take 1 Tablet by mouth every evening.  Dispense: 90 Tablet; Refill: 3    Return in about 3 months (around 10/11/2023) for POC A1C, blood sugar check.    Etta Joe PA-C ([place])  Physician Assistant Certified  [facility]    Please note that this dictation was created using voice recognition  software. I have made every reasonable attempt to correct obvious errors, but I expect that there are errors of grammar and possibly content that I did not discover before finalizing the note.

## 2023-08-17 ENCOUNTER — HOSPITAL ENCOUNTER (OUTPATIENT)
Dept: LAB | Facility: MEDICAL CENTER | Age: 78
End: 2023-08-17
Attending: INTERNAL MEDICINE
Payer: MEDICARE

## 2023-08-17 DIAGNOSIS — C88.0 WALDENSTROM MACROGLOBULINEMIA (HCC): ICD-10-CM

## 2023-08-17 LAB
ALBUMIN SERPL BCP-MCNC: 4.6 G/DL (ref 3.2–4.9)
ALBUMIN/GLOB SERPL: 1.5 G/DL
ALP SERPL-CCNC: 57 U/L (ref 30–99)
ALT SERPL-CCNC: 11 U/L (ref 2–50)
ANION GAP SERPL CALC-SCNC: 12 MMOL/L (ref 7–16)
AST SERPL-CCNC: 20 U/L (ref 12–45)
BASOPHILS # BLD AUTO: 0.6 % (ref 0–1.8)
BASOPHILS # BLD: 0.04 K/UL (ref 0–0.12)
BILIRUB SERPL-MCNC: 0.4 MG/DL (ref 0.1–1.5)
BUN SERPL-MCNC: 16 MG/DL (ref 8–22)
CALCIUM ALBUM COR SERPL-MCNC: 8.8 MG/DL (ref 8.5–10.5)
CALCIUM SERPL-MCNC: 9.3 MG/DL (ref 8.5–10.5)
CHLORIDE SERPL-SCNC: 103 MMOL/L (ref 96–112)
CO2 SERPL-SCNC: 23 MMOL/L (ref 20–33)
CREAT SERPL-MCNC: 0.93 MG/DL (ref 0.5–1.4)
EOSINOPHIL # BLD AUTO: 0.1 K/UL (ref 0–0.51)
EOSINOPHIL NFR BLD: 1.5 % (ref 0–6.9)
ERYTHROCYTE [DISTWIDTH] IN BLOOD BY AUTOMATED COUNT: 46.2 FL (ref 35.9–50)
GFR SERPLBLD CREATININE-BSD FMLA CKD-EPI: 85 ML/MIN/1.73 M 2
GLOBULIN SER CALC-MCNC: 3 G/DL (ref 1.9–3.5)
GLUCOSE SERPL-MCNC: 114 MG/DL (ref 65–99)
HCT VFR BLD AUTO: 40.4 % (ref 42–52)
HGB BLD-MCNC: 13.8 G/DL (ref 14–18)
IMM GRANULOCYTES # BLD AUTO: 0.01 K/UL (ref 0–0.11)
IMM GRANULOCYTES NFR BLD AUTO: 0.2 % (ref 0–0.9)
LYMPHOCYTES # BLD AUTO: 2.1 K/UL (ref 1–4.8)
LYMPHOCYTES NFR BLD: 31.6 % (ref 22–41)
MCH RBC QN AUTO: 33 PG (ref 27–33)
MCHC RBC AUTO-ENTMCNC: 34.2 G/DL (ref 32.3–36.5)
MCV RBC AUTO: 96.7 FL (ref 81.4–97.8)
MONOCYTES # BLD AUTO: 0.63 K/UL (ref 0–0.85)
MONOCYTES NFR BLD AUTO: 9.5 % (ref 0–13.4)
NEUTROPHILS # BLD AUTO: 3.76 K/UL (ref 1.82–7.42)
NEUTROPHILS NFR BLD: 56.6 % (ref 44–72)
NRBC # BLD AUTO: 0 K/UL
NRBC BLD-RTO: 0 /100 WBC (ref 0–0.2)
PLATELET # BLD AUTO: 259 K/UL (ref 164–446)
PMV BLD AUTO: 8.6 FL (ref 9–12.9)
POTASSIUM SERPL-SCNC: 4.3 MMOL/L (ref 3.6–5.5)
PROT SERPL-MCNC: 7.6 G/DL (ref 6–8.2)
RBC # BLD AUTO: 4.18 M/UL (ref 4.7–6.1)
SODIUM SERPL-SCNC: 138 MMOL/L (ref 135–145)
WBC # BLD AUTO: 6.6 K/UL (ref 4.8–10.8)

## 2023-08-17 PROCEDURE — 82784 ASSAY IGA/IGD/IGG/IGM EACH: CPT

## 2023-08-17 PROCEDURE — 36415 COLL VENOUS BLD VENIPUNCTURE: CPT

## 2023-08-17 PROCEDURE — 80053 COMPREHEN METABOLIC PANEL: CPT

## 2023-08-17 PROCEDURE — 85025 COMPLETE CBC W/AUTO DIFF WBC: CPT

## 2023-08-17 PROCEDURE — 84165 PROTEIN E-PHORESIS SERUM: CPT

## 2023-08-17 PROCEDURE — 84155 ASSAY OF PROTEIN SERUM: CPT

## 2023-08-17 PROCEDURE — 83521 IG LIGHT CHAINS FREE EACH: CPT

## 2023-08-17 PROCEDURE — 85810 BLOOD VISCOSITY EXAMINATION: CPT

## 2023-08-17 PROCEDURE — 86334 IMMUNOFIX E-PHORESIS SERUM: CPT

## 2023-08-20 LAB
ALBUMIN SERPL ELPH-MCNC: 4.48 G/DL (ref 3.75–5.01)
ALPHA1 GLOB SERPL ELPH-MCNC: 0.28 G/DL (ref 0.19–0.46)
ALPHA2 GLOB SERPL ELPH-MCNC: 0.78 G/DL (ref 0.48–1.05)
B-GLOBULIN SERPL ELPH-MCNC: 0.65 G/DL (ref 0.48–1.1)
EER MONOCLONAL PROTEIN AND FLC, SERUM Q5224: ABNORMAL
GAMMA GLOB SERPL ELPH-MCNC: 1.41 G/DL (ref 0.62–1.51)
IGA SERPL-MCNC: 72 MG/DL (ref 68–408)
IGG SERPL-MCNC: 809 MG/DL (ref 768–1632)
IGM SERPL-MCNC: 850 MG/DL (ref 35–263)
INTERPRETATION SERPL IFE-IMP: ABNORMAL
INTERPRETATION SERPL IFE-IMP: ABNORMAL
KAPPA LC FREE SER-MCNC: 40.39 MG/L (ref 3.3–19.4)
KAPPA LC FREE/LAMBDA FREE SER NEPH: 5.53 {RATIO} (ref 0.26–1.65)
LAMBDA LC FREE SERPL-MCNC: 7.31 MG/L (ref 5.71–26.3)
MONOCLONAL PROTEIN NL11656: 0.91 G/DL
PROT SERPL-MCNC: 7.6 G/DL (ref 6.3–8.2)

## 2023-08-21 LAB — VISC SER: 1.23 CP (ref 1.1–1.8)

## 2023-08-22 NOTE — PROGRESS NOTES
08/29/23    Subjective    Chief Complaint:  Follow up Waldenstrom's macroglobulinemia    HPI:  77 male  with MYD88 positive lymphoplasmacytic lymphoma. He is mildly anemic. Ca++, BUN, creatinine, viscosity are normal. IgM actually down a tad, kappa/lambda ratio up a tad. Feels well. Riding an electric mountain bike.     ROS:    Constitutional: No weight loss  Skin: No rash or jaundice  HENT: No change in eyesight or hearing  Cardiovascular:No chest pain or arrythmia  Respiratory:No cough or SOB  GI:No nausea, vomiting, diarrhea, constipation  :No dysuria or frequency  Musculoskeletal:No bone or joint pain  Neuro:No sx's of neuropathy  Psych: No complaints    PMH:      No Known Allergies    Past Medical History:   Diagnosis Date    Cataract     alonso iol    DDD (degenerative disc disease), cervical     DDD (degenerative disc disease), cervical     DDD (degenerative disc disease), lumbar     Dyslipidemia     Heart murmur     Hiatus hernia syndrome     Hyponatremia 4/13/2021    Lumbar spinal stenosis     Vitamin D deficiency 2/2/2018        Past Surgical History:   Procedure Laterality Date    CA DX BONE MARROW ASPIRATIONS Left 1/21/2022    Procedure: ASPIRATION, BONE MARROW - DR SANCHEZ;  Surgeon: Tyrel Mcintyre M.D.;  Location: SURGERY SAME DAY [facility];  Service: Orthopedics    CA DX BONE MARROW BIOPSIES Left 1/21/2022    Procedure: BIOPSY, BONE MARROW, USING NEEDLE OR TROCAR;  Surgeon: Tyrel Mcintyre M.D.;  Location: SURGERY SAME DAY [facility];  Service: Orthopedics    INGUINAL HERNIA REPAIR ROBOTIC Left 4/8/2016    Procedure: INGUINAL HERNIA REPAIR ROBOTIC WITH MESH;  Surgeon: Dustin Maria M.D.;  Location: SURGERY SAME DAY [facility];  Service:     OTHER  2006    alonso iol    CATARACT EXTRACTION WITH IOL Bilateral     HERNIA REPAIR      bilat        Medications:    Current Outpatient Medications on File Prior to Encounter   Medication Sig Dispense Refill    levothyroxine (SYNTHROID) 50 MCG Tab  "Take 1 Tablet by mouth every morning on an empty stomach. 100 Tablet 3    rosuvastatin (CRESTOR) 10 MG Tab Take 1 Tablet by mouth every evening. 100 Tablet 3    levothyroxine (SYNTHROID) 25 MCG Tab Take on Sundays with 50 mcg tablet. 12 Tablet 3    Coenzyme Q10 300 MG Cap Take 1 Capsule by mouth every day. 30 Capsule     B Complex Vitamins (B COMPLEX-B12 PO) Take  by mouth every day.      VITAMIN A PO Take  by mouth every day.      Cholecalciferol (VITAMIN D3 PO) Take  by mouth every day.       No current facility-administered medications on file prior to encounter.       Social History     Tobacco Use    Smoking status: Never    Smokeless tobacco: Never   Substance Use Topics    Alcohol use: Yes     Alcohol/week: 0.6 oz     Types: 1 Glasses of wine per week     Comment: 4-5/week; beer once in a while. 1/18/22: 1 drink per day.        Family History   Problem Relation Age of Onset    Cancer Mother 50        breast    Cancer Paternal Grandmother 70        colon cancer        Objective    Vitals:    /60 (BP Location: Left arm, Patient Position: Sitting, BP Cuff Size: Adult)   Pulse 64   Temp 37 °C (98.6 °F) (Temporal)   Ht 1.778 m (5' 10\")   Wt 63.7 kg (140 lb 8.7 oz)   SpO2 99%   BMI 20.17 kg/m²     Physical Exam:    Appears well-developed and well-nourished. No distress.    Head -  Normocephalic .   Eyes - Pupils are equal. Conjunctivae normal. No scleral icterus.   Ears - normal hearing  Mouth - Throat: Oropharynx is clear and moist. No oropharyngeal exudate  Neck - Neck supple. No thyromegaly  Cardiovascular - Normal rate, regular rhythm, normal heart sounds and intact distal pulses. No  gallop, murmur or rub  Pulmonary - Normal breath sounds.  No wheeze, rales or rhonchi  Abdominal -Soft. No distension, tenderness, organomegaly or mass  Extremities-  No edema or tenderness.    Nodes - No submental, submandibular, preauricular, cervical, axillary or inguinal adenopathy.    Neurological -   Alert and " oriented.  Skin - Skin is warm and dry. No rash noted. Not diaphoretic. No erythema. No pallor. No jaundice   Psychiatric -  Normal mood and affect.    Labs:     Latest Reference Range & Units 06/30/23 07:18 08/17/23 10:42   WBC 4.8 - 10.8 K/uL 6.0 6.6   RBC 4.70 - 6.10 M/uL 4.14 (L) 4.18 (L)   Hemoglobin 14.0 - 18.0 g/dL 13.6 (L) 13.8 (L)   Hematocrit 42.0 - 52.0 % 39.9 (L) 40.4 (L)   MCV 81.4 - 97.8 fL 96.4 96.7   MCH 27.0 - 33.0 pg 32.9 33.0   MCHC 32.3 - 36.5 g/dL 34.1 34.2   RDW 35.9 - 50.0 fL 43.9 46.2   Platelet Count 164 - 446 K/uL 281 259      Latest Reference Range & Units 08/17/23 10:42   Sodium 135 - 145 mmol/L 138   Potassium 3.6 - 5.5 mmol/L 4.3   Chloride 96 - 112 mmol/L 103   Co2 20 - 33 mmol/L 23   Anion Gap 7.0 - 16.0  12.0   Glucose 65 - 99 mg/dL 114 (H)   Bun 8 - 22 mg/dL 16   Creatinine 0.50 - 1.40 mg/dL 0.93   GFR (CKD-EPI) >60 mL/min/1.73 m 2 85   Calcium 8.5 - 10.5 mg/dL 9.3   Correct Calcium 8.5 - 10.5 mg/dL 8.8   AST(SGOT) 12 - 45 U/L 20   ALT(SGPT) 2 - 50 U/L 11   Alkaline Phosphatase 30 - 99 U/L 57   Total Bilirubin 0.1 - 1.5 mg/dL 0.4   Albumin 3.2 - 4.9 g/dL 4.6   Total Protein 6.0 - 8.2 g/dL 7.6   Globulin 1.9 - 3.5 g/dL 3.0   A-G Ratio g/dL 1.5      Latest Reference Range & Units 01/31/23 08:09 08/17/23 10:42   Immunoglobulin A 68 - 408 mg/dL 74 72   Immunoglobulin G 768 - 1632 mg/dL 883 809   Immunoglobulin M 35 - 263 mg/dL 1160 (H) 850 (H)      Latest Reference Range & Units 01/31/23 08:09 08/17/23 10:42   Free Kappa Light Chains 3.30 - 19.40 mg/L 36.99 (H) 40.39 (H)   Free Lambda Light Chains 5.71 - 26.30 mg/L 8.06 7.31   Kappa-Lambda Ratio 0.26 - 1.65  4.59 (H) 5.53 (H)      Latest Reference Range & Units 08/17/23 10:42   Viscosity 1.10 - 1.80 cP 1.23     Assessment  Stable  Imp:    Visit Diagnosis:    1. Waldenstrom macroglobulinemia (HCC)            Plan:  6 months with same lab    Carson Pineda M.D.

## 2023-08-24 DIAGNOSIS — E03.9 HYPOTHYROIDISM, UNSPECIFIED TYPE: ICD-10-CM

## 2023-08-24 RX ORDER — LEVOTHYROXINE SODIUM 0.05 MG/1
50 TABLET ORAL
Qty: 100 TABLET | Refills: 3 | Status: SHIPPED | OUTPATIENT
Start: 2023-08-24 | End: 2023-12-02 | Stop reason: SDUPTHER

## 2023-08-29 ENCOUNTER — HOSPITAL ENCOUNTER (OUTPATIENT)
Dept: HEMATOLOGY ONCOLOGY | Facility: MEDICAL CENTER | Age: 78
End: 2023-08-29
Attending: INTERNAL MEDICINE
Payer: MEDICARE

## 2023-08-29 VITALS
HEIGHT: 70 IN | TEMPERATURE: 98.6 F | DIASTOLIC BLOOD PRESSURE: 60 MMHG | WEIGHT: 140.54 LBS | HEART RATE: 64 BPM | BODY MASS INDEX: 20.12 KG/M2 | SYSTOLIC BLOOD PRESSURE: 122 MMHG | OXYGEN SATURATION: 99 %

## 2023-08-29 DIAGNOSIS — C88.0 WALDENSTROM MACROGLOBULINEMIA (HCC): ICD-10-CM

## 2023-08-29 PROCEDURE — 99214 OFFICE O/P EST MOD 30 MIN: CPT | Performed by: INTERNAL MEDICINE

## 2023-08-29 PROCEDURE — 99212 OFFICE O/P EST SF 10 MIN: CPT | Performed by: INTERNAL MEDICINE

## 2023-08-29 ASSESSMENT — FIBROSIS 4 INDEX: FIB4 SCORE: 1.77

## 2023-08-29 NOTE — ADDENDUM NOTE
Encounter addended by: Isaak Godoy on: 8/29/2023 1:49 PM   Actions taken: Charge Capture section accepted

## 2023-10-09 DIAGNOSIS — Z12.11 SCREENING FOR COLORECTAL CANCER: ICD-10-CM

## 2023-10-09 DIAGNOSIS — Z12.12 SCREENING FOR COLORECTAL CANCER: ICD-10-CM

## 2023-10-31 ENCOUNTER — OFFICE VISIT (OUTPATIENT)
Dept: MEDICAL GROUP | Facility: IMAGING CENTER | Age: 78
End: 2023-10-31
Payer: MEDICARE

## 2023-10-31 VITALS
RESPIRATION RATE: 16 BRPM | HEIGHT: 70 IN | WEIGHT: 138 LBS | OXYGEN SATURATION: 97 % | TEMPERATURE: 97.9 F | DIASTOLIC BLOOD PRESSURE: 70 MMHG | SYSTOLIC BLOOD PRESSURE: 120 MMHG | HEART RATE: 68 BPM | BODY MASS INDEX: 19.76 KG/M2

## 2023-10-31 DIAGNOSIS — E03.9 HYPOTHYROIDISM, UNSPECIFIED TYPE: ICD-10-CM

## 2023-10-31 DIAGNOSIS — H43.393 VITREOUS FLOATERS OF BOTH EYES: ICD-10-CM

## 2023-10-31 DIAGNOSIS — R73.02 IGT (IMPAIRED GLUCOSE TOLERANCE): ICD-10-CM

## 2023-10-31 DIAGNOSIS — Z12.5 PROSTATE CANCER SCREENING: ICD-10-CM

## 2023-10-31 DIAGNOSIS — Z23 NEED FOR VACCINATION: ICD-10-CM

## 2023-10-31 LAB
HBA1C MFR BLD: 6.3 % (ref ?–5.8)
POCT INT CON NEG: NEGATIVE
POCT INT CON POS: POSITIVE

## 2023-10-31 PROCEDURE — 83036 HEMOGLOBIN GLYCOSYLATED A1C: CPT | Performed by: PHYSICIAN ASSISTANT

## 2023-10-31 PROCEDURE — 3078F DIAST BP <80 MM HG: CPT | Performed by: PHYSICIAN ASSISTANT

## 2023-10-31 PROCEDURE — 3074F SYST BP LT 130 MM HG: CPT | Performed by: PHYSICIAN ASSISTANT

## 2023-10-31 PROCEDURE — 1126F AMNT PAIN NOTED NONE PRSNT: CPT | Performed by: PHYSICIAN ASSISTANT

## 2023-10-31 PROCEDURE — G0008 ADMIN INFLUENZA VIRUS VAC: HCPCS | Performed by: PHYSICIAN ASSISTANT

## 2023-10-31 PROCEDURE — 90662 IIV NO PRSV INCREASED AG IM: CPT | Performed by: PHYSICIAN ASSISTANT

## 2023-10-31 PROCEDURE — 99214 OFFICE O/P EST MOD 30 MIN: CPT | Mod: 25 | Performed by: PHYSICIAN ASSISTANT

## 2023-10-31 RX ORDER — POLYETHYLENE GLYCOL-3350 AND ELECTROLYTES 236; 6.74; 5.86; 2.97; 22.74 G/274.31G; G/274.31G; G/274.31G; G/274.31G; G/274.31G
POWDER, FOR SOLUTION ORAL
COMMUNITY
Start: 2023-10-18 | End: 2024-03-22

## 2023-10-31 ASSESSMENT — PAIN SCALES - GENERAL: PAINLEVEL: NO PAIN

## 2023-10-31 ASSESSMENT — FIBROSIS 4 INDEX: FIB4 SCORE: 1.77

## 2023-10-31 NOTE — ASSESSMENT & PLAN NOTE
Patient states that he is due for follow-up with ophthalmology regarding his chronic floaters.  Needs new referral today.

## 2023-10-31 NOTE — ASSESSMENT & PLAN NOTE
Chronic, controlled and stable.  He stays very active.  Rides his bike daily and does weight training/resistance training.  States he did he does like eating sugar, eats cereal very often.

## 2023-10-31 NOTE — PROGRESS NOTES
Subjective:     CC:   Chief Complaint   Patient presents with    Follow-Up     Requesting labs        HPI:   Chirag presents today to discuss:    Hypothyroidism  Chronic, states that he keeps forgetting to take an additional 25 mcg of Synthroid on Sundays but plans to start doing so.    IGT (impaired glucose tolerance)  Chronic, controlled and stable.  He stays very active.  Rides his bike daily and does weight training/resistance training.  States he did he does like eating sugar, eats cereal very often.    Vitreous floaters of both eyes  Patient states that he is due for follow-up with ophthalmology regarding his chronic floaters.  Needs new referral today.      Past Medical History:   Diagnosis Date    Cataract     alonso iol    DDD (degenerative disc disease), cervical     DDD (degenerative disc disease), cervical     DDD (degenerative disc disease), lumbar     Dyslipidemia     Heart murmur     Hiatus hernia syndrome     Hyponatremia 4/13/2021    Lumbar spinal stenosis     Vitamin D deficiency 2/2/2018     Family History   Problem Relation Age of Onset    Cancer Mother 50        breast    Cancer Paternal Grandmother 70        colon cancer     Past Surgical History:   Procedure Laterality Date    MI DX BONE MARROW ASPIRATIONS Left 1/21/2022    Procedure: ASPIRATION, BONE MARROW - DR SANCHEZ;  Surgeon: Tyrel Mcintyre M.D.;  Location: SURGERY SAME DAY [facility];  Service: Orthopedics    MI DX BONE MARROW BIOPSIES Left 1/21/2022    Procedure: BIOPSY, BONE MARROW, USING NEEDLE OR TROCAR;  Surgeon: Tyrel Mcintyre M.D.;  Location: SURGERY SAME DAY [facility];  Service: Orthopedics    INGUINAL HERNIA REPAIR ROBOTIC Left 4/8/2016    Procedure: INGUINAL HERNIA REPAIR ROBOTIC WITH MESH;  Surgeon: Dustin Maria M.D.;  Location: SURGERY SAME DAY [facility];  Service:     OTHER  2006    alonso iol    CATARACT EXTRACTION WITH IOL Bilateral     HERNIA REPAIR      bilat     Social History     Tobacco Use    Smoking status: Never     "Smokeless tobacco: Never   Vaping Use    Vaping Use: Some days   Substance Use Topics    Alcohol use: Yes     Alcohol/week: 0.6 oz     Types: 1 Glasses of wine per week     Comment: 4-5/week; beer once in a while. 1/18/22: 1 drink per day.    Drug use: No     Social History     Social History Narrative    Not on file     Current Outpatient Medications Ordered in Epic   Medication Sig Dispense Refill    levothyroxine (SYNTHROID) 50 MCG Tab Take 1 Tablet by mouth every morning on an empty stomach. 100 Tablet 3    rosuvastatin (CRESTOR) 10 MG Tab Take 1 Tablet by mouth every evening. 100 Tablet 3    levothyroxine (SYNTHROID) 25 MCG Tab Take on Sundays with 50 mcg tablet. 12 Tablet 3    Coenzyme Q10 300 MG Cap Take 1 Capsule by mouth every day. 30 Capsule     B Complex Vitamins (B COMPLEX-B12 PO) Take  by mouth every day.      VITAMIN A PO Take  by mouth every day.      Cholecalciferol (VITAMIN D3 PO) Take  by mouth every day.      GAVILYTE-G 236 g Recon Soln        No current Epic-ordered facility-administered medications on file.     Patient has no known allergies.    PMH/PSH/FH/Social history reviewed.  Vaccinations discussed.  Previous records and labs reviewed. Discussed age appropriate anticipatory guidance.    ROS: see hpi  Gen: no fevers/chills  Pulm: no sob, no cough  CV: no chest pain, no palpitations, no edema  GI: no nausea/vomiting, no diarrhea  Skin: no rash    Objective:   Exam:  /70 (BP Location: Right arm, Patient Position: Sitting, BP Cuff Size: Adult)   Pulse 68   Temp 36.6 °C (97.9 °F) (Temporal)   Resp 16   Ht 1.778 m (5' 10\")   Wt 62.6 kg (138 lb)   SpO2 97%   BMI 19.80 kg/m²    Body mass index is 19.8 kg/m².    Gen: Alert and oriented, No apparent distress.  HEENT: Head atraumatic, normocephalic. Pupils equal and round.  Neck: Neck is supple without lymphadenopathy.   Lungs: Normal effort, CTA bilaterally, no wheezes, rhonchi, or rales  CV: Regular rate and rhythm. No murmurs, rubs, " or gallops.  Ext: No clubbing, cyanosis, edema.    Assessment & Plan:     77 y.o. male with the following -     1. IGT (impaired glucose tolerance)  Chronic, controlled and stable.  Your A1c, which is a diabetes marker, is within the prediabetes/impaired glucose tolerance range.  Please follow a low-carb Mediterranean diet and I will continue to monitor that value.  Recheck in 3 to 6 months.  - POCT  A1C  - HEMOGLOBIN A1C; Future    2. Hypothyroidism, unspecified type  Chronic, controlled and stable. Continue current regimen -Synthroid 50 mcg daily and 75 mcg on Sundays.  Repeat TSH in 3 months with labs from hematology.  - TSH WITH REFLEX TO FT4; Future    3. Vitreous floaters of both eyes  - Referral to Ophthalmology    4. Need for vaccination  - Influenza Vaccine, High Dose (65+ Only)    5. Prostate cancer screening  - PROSTATE SPECIFIC AG SCREENING; Future    Return in about 4 months (around 2/29/2024) for Annual wellness exam.    Etta Joe PA-C ([place])  Physician Assistant Certified  [facility]    Please note that this dictation was created using voice recognition software. I have made every reasonable attempt to correct obvious errors, but I expect that there are errors of grammar and possibly content that I did not discover before finalizing the note.

## 2023-10-31 NOTE — ASSESSMENT & PLAN NOTE
Chronic, states that he keeps forgetting to take an additional 25 mcg of Synthroid on Sundays but plans to start doing so.

## 2023-10-31 NOTE — PATIENT INSTRUCTIONS
[place] [id number] [facility]. [place] [address]  Phone: [phone]    [facility]  VALERIANO WALKER  [address] [place], [place] [address] [address]  Phone: [phone]

## 2023-12-02 DIAGNOSIS — E03.9 HYPOTHYROIDISM, UNSPECIFIED TYPE: ICD-10-CM

## 2023-12-04 RX ORDER — LEVOTHYROXINE SODIUM 0.05 MG/1
50 TABLET ORAL
Qty: 100 TABLET | Refills: 3 | Status: SHIPPED | OUTPATIENT
Start: 2023-12-04 | End: 2023-12-06 | Stop reason: SDUPTHER

## 2023-12-06 DIAGNOSIS — E03.9 HYPOTHYROIDISM, UNSPECIFIED TYPE: ICD-10-CM

## 2023-12-07 RX ORDER — LEVOTHYROXINE SODIUM 0.05 MG/1
50 TABLET ORAL
Qty: 100 TABLET | Refills: 3 | Status: SHIPPED | OUTPATIENT
Start: 2023-12-07 | End: 2024-03-17 | Stop reason: SDUPTHER

## 2023-12-19 DIAGNOSIS — I70.0 ATHEROSCLEROSIS OF AORTA (HCC): Chronic | ICD-10-CM

## 2023-12-19 RX ORDER — ROSUVASTATIN CALCIUM 10 MG/1
10 TABLET, COATED ORAL EVERY EVENING
Qty: 100 TABLET | Refills: 3 | Status: SHIPPED | OUTPATIENT
Start: 2023-12-19

## 2023-12-19 NOTE — TELEPHONE ENCOUNTER
Received request via: Patient    Was the patient seen in the last year in this department? Yes    Does the patient have an active prescription (recently filled or refills available) for medication(s) requested? No    Does the patient have [facility] Plus and need 100 day supply (blood pressure, diabetes and cholesterol meds only)? Yes, quantity updated to 100 days

## 2024-01-22 SDOH — ECONOMIC STABILITY: HOUSING INSECURITY: IN THE LAST 12 MONTHS, HOW MANY PLACES HAVE YOU LIVED?: 1

## 2024-01-22 SDOH — HEALTH STABILITY: PHYSICAL HEALTH: ON AVERAGE, HOW MANY MINUTES DO YOU ENGAGE IN EXERCISE AT THIS LEVEL?: 30 MIN

## 2024-01-22 SDOH — ECONOMIC STABILITY: TRANSPORTATION INSECURITY
IN THE PAST 12 MONTHS, HAS THE LACK OF TRANSPORTATION KEPT YOU FROM MEDICAL APPOINTMENTS OR FROM GETTING MEDICATIONS?: NO

## 2024-01-22 SDOH — ECONOMIC STABILITY: INCOME INSECURITY: IN THE LAST 12 MONTHS, WAS THERE A TIME WHEN YOU WERE NOT ABLE TO PAY THE MORTGAGE OR RENT ON TIME?: NO

## 2024-01-22 SDOH — ECONOMIC STABILITY: TRANSPORTATION INSECURITY
IN THE PAST 12 MONTHS, HAS LACK OF RELIABLE TRANSPORTATION KEPT YOU FROM MEDICAL APPOINTMENTS, MEETINGS, WORK OR FROM GETTING THINGS NEEDED FOR DAILY LIVING?: NO

## 2024-01-22 SDOH — ECONOMIC STABILITY: FOOD INSECURITY: WITHIN THE PAST 12 MONTHS, YOU WORRIED THAT YOUR FOOD WOULD RUN OUT BEFORE YOU GOT MONEY TO BUY MORE.: NEVER TRUE

## 2024-01-22 SDOH — ECONOMIC STABILITY: INCOME INSECURITY: HOW HARD IS IT FOR YOU TO PAY FOR THE VERY BASICS LIKE FOOD, HOUSING, MEDICAL CARE, AND HEATING?: NOT HARD AT ALL

## 2024-01-22 SDOH — HEALTH STABILITY: PHYSICAL HEALTH: ON AVERAGE, HOW MANY DAYS PER WEEK DO YOU ENGAGE IN MODERATE TO STRENUOUS EXERCISE (LIKE A BRISK WALK)?: 5 DAYS

## 2024-01-22 SDOH — ECONOMIC STABILITY: TRANSPORTATION INSECURITY
IN THE PAST 12 MONTHS, HAS LACK OF TRANSPORTATION KEPT YOU FROM MEETINGS, WORK, OR FROM GETTING THINGS NEEDED FOR DAILY LIVING?: NO

## 2024-01-22 SDOH — ECONOMIC STABILITY: HOUSING INSECURITY

## 2024-01-22 SDOH — ECONOMIC STABILITY: HOUSING INSECURITY
IN THE LAST 12 MONTHS, WAS THERE A TIME WHEN YOU DID NOT HAVE A STEADY PLACE TO SLEEP OR SLEPT IN A SHELTER (INCLUDING NOW)?: NO

## 2024-01-22 SDOH — HEALTH STABILITY: MENTAL HEALTH
STRESS IS WHEN SOMEONE FEELS TENSE, NERVOUS, ANXIOUS, OR CAN'T SLEEP AT NIGHT BECAUSE THEIR MIND IS TROUBLED. HOW STRESSED ARE YOU?: NOT AT ALL

## 2024-01-22 ASSESSMENT — SOCIAL DETERMINANTS OF HEALTH (SDOH)
HOW OFTEN DO YOU HAVE SIX OR MORE DRINKS ON ONE OCCASION: NEVER
WITHIN THE PAST 12 MONTHS, YOU WORRIED THAT YOUR FOOD WOULD RUN OUT BEFORE YOU GOT THE MONEY TO BUY MORE: NEVER TRUE
HOW MANY DRINKS CONTAINING ALCOHOL DO YOU HAVE ON A TYPICAL DAY WHEN YOU ARE DRINKING: 1 OR 2
DO YOU BELONG TO ANY CLUBS OR ORGANIZATIONS SUCH AS CHURCH GROUPS UNIONS, FRATERNAL OR ATHLETIC GROUPS, OR SCHOOL GROUPS?: NO
HOW OFTEN DO YOU ATTEND CHURCH OR RELIGIOUS SERVICES?: NEVER
IN A TYPICAL WEEK, HOW MANY TIMES DO YOU TALK ON THE PHONE WITH FAMILY, FRIENDS, OR NEIGHBORS?: MORE THAN THREE TIMES A WEEK
HOW OFTEN DO YOU HAVE A DRINK CONTAINING ALCOHOL: 4 OR MORE TIMES A WEEK
IN A TYPICAL WEEK, HOW MANY TIMES DO YOU TALK ON THE PHONE WITH FAMILY, FRIENDS, OR NEIGHBORS?: MORE THAN THREE TIMES A WEEK
HOW OFTEN DO YOU ATTENT MEETINGS OF THE CLUB OR ORGANIZATION YOU BELONG TO?: NEVER
HOW HARD IS IT FOR YOU TO PAY FOR THE VERY BASICS LIKE FOOD, HOUSING, MEDICAL CARE, AND HEATING?: NOT HARD AT ALL
HOW OFTEN DO YOU GET TOGETHER WITH FRIENDS OR RELATIVES?: ONCE A WEEK
HOW OFTEN DO YOU GET TOGETHER WITH FRIENDS OR RELATIVES?: ONCE A WEEK
HOW OFTEN DO YOU ATTENT MEETINGS OF THE CLUB OR ORGANIZATION YOU BELONG TO?: NEVER
HOW OFTEN DO YOU ATTEND CHURCH OR RELIGIOUS SERVICES?: NEVER
DO YOU BELONG TO ANY CLUBS OR ORGANIZATIONS SUCH AS CHURCH GROUPS UNIONS, FRATERNAL OR ATHLETIC GROUPS, OR SCHOOL GROUPS?: NO

## 2024-01-22 ASSESSMENT — LIFESTYLE VARIABLES
AUDIT-C TOTAL SCORE: 4
HOW MANY STANDARD DRINKS CONTAINING ALCOHOL DO YOU HAVE ON A TYPICAL DAY: 1 OR 2
HOW OFTEN DO YOU HAVE SIX OR MORE DRINKS ON ONE OCCASION: NEVER
SKIP TO QUESTIONS 9-10: 1
HOW OFTEN DO YOU HAVE A DRINK CONTAINING ALCOHOL: 4 OR MORE TIMES A WEEK

## 2024-01-23 ENCOUNTER — OFFICE VISIT (OUTPATIENT)
Dept: MEDICAL GROUP | Facility: IMAGING CENTER | Age: 79
End: 2024-01-23
Payer: MEDICARE

## 2024-01-23 VITALS
SYSTOLIC BLOOD PRESSURE: 110 MMHG | HEART RATE: 69 BPM | HEIGHT: 70 IN | RESPIRATION RATE: 16 BRPM | OXYGEN SATURATION: 98 % | DIASTOLIC BLOOD PRESSURE: 60 MMHG | BODY MASS INDEX: 20.04 KG/M2 | TEMPERATURE: 98 F | WEIGHT: 140 LBS

## 2024-01-23 DIAGNOSIS — R14.0 ABDOMINAL BLOATING: ICD-10-CM

## 2024-01-23 DIAGNOSIS — Z78.9 PHYSICIAN ORDERS FOR LIFE-SUSTAINING TREATMENT (POLST) FORM INDICATES PATIENT WISH FOR FULL CODE RESUSCITATION STATUS: ICD-10-CM

## 2024-01-23 DIAGNOSIS — E78.5 DYSLIPIDEMIA: Chronic | ICD-10-CM

## 2024-01-23 DIAGNOSIS — Z12.5 PROSTATE CANCER SCREENING: ICD-10-CM

## 2024-01-23 DIAGNOSIS — C88.0 WALDENSTROM MACROGLOBULINEMIA (HCC): ICD-10-CM

## 2024-01-23 DIAGNOSIS — G31.9 CEREBRAL ATROPHY (HCC): ICD-10-CM

## 2024-01-23 DIAGNOSIS — R42 POSITIONAL LIGHTHEADEDNESS: ICD-10-CM

## 2024-01-23 DIAGNOSIS — E03.9 HYPOTHYROIDISM, UNSPECIFIED TYPE: ICD-10-CM

## 2024-01-23 DIAGNOSIS — R73.02 IGT (IMPAIRED GLUCOSE TOLERANCE): ICD-10-CM

## 2024-01-23 DIAGNOSIS — Z00.00 ENCOUNTER FOR ANNUAL WELLNESS EXAM IN MEDICARE PATIENT: ICD-10-CM

## 2024-01-23 DIAGNOSIS — I70.0 ATHEROSCLEROSIS OF AORTA (HCC): Chronic | ICD-10-CM

## 2024-01-23 DIAGNOSIS — D64.9 ANEMIA, UNSPECIFIED TYPE: ICD-10-CM

## 2024-01-23 DIAGNOSIS — H91.13 PRESBYCUSIS OF BOTH EARS: ICD-10-CM

## 2024-01-23 DIAGNOSIS — H93.13 TINNITUS OF BOTH EARS: ICD-10-CM

## 2024-01-23 PROCEDURE — 3078F DIAST BP <80 MM HG: CPT | Performed by: PHYSICIAN ASSISTANT

## 2024-01-23 PROCEDURE — 3074F SYST BP LT 130 MM HG: CPT | Performed by: PHYSICIAN ASSISTANT

## 2024-01-23 PROCEDURE — G0439 PPPS, SUBSEQ VISIT: HCPCS | Performed by: PHYSICIAN ASSISTANT

## 2024-01-23 PROCEDURE — 1126F AMNT PAIN NOTED NONE PRSNT: CPT | Performed by: PHYSICIAN ASSISTANT

## 2024-01-23 ASSESSMENT — PATIENT HEALTH QUESTIONNAIRE - PHQ9: CLINICAL INTERPRETATION OF PHQ2 SCORE: 0

## 2024-01-23 ASSESSMENT — ENCOUNTER SYMPTOMS: GENERAL WELL-BEING: EXCELLENT

## 2024-01-23 ASSESSMENT — ACTIVITIES OF DAILY LIVING (ADL): BATHING_REQUIRES_ASSISTANCE: 0

## 2024-01-23 ASSESSMENT — PAIN SCALES - GENERAL: PAINLEVEL: NO PAIN

## 2024-01-23 ASSESSMENT — FIBROSIS 4 INDEX: FIB4 SCORE: 1.79

## 2024-01-23 NOTE — PROGRESS NOTES
Chief Complaint   Patient presents with    Paperwork    Requesting Labs    Annual Wellness Visit       HPI:  Chirag Damon Jr. is a 78 y.o. here for Medicare Annual Wellness Visit     Patient admits to intermittent abdominal bloating, as well as positional lightheadedness.  No nausea, vomiting, headaches, bowel habit changes, vision changes.  He has had these symptoms for over a year.  Has had cardiology workup.  He also has chronic tinnitus and mild hearing loss.  Due for audiology exam. Has noticed some decrease in his short-term memory over the past year, but nothing that is affecting his daily life or causing any concern for safety.    Patient Active Problem List    Diagnosis Date Noted    Anemia 01/23/2024    Positional lightheadedness 01/23/2024    Tinnitus of both ears 01/23/2024    Physician orders for life-sustaining treatment (POLST) form indicates patient wish for full code resuscitation status 01/23/2024    Dyslipidemia     Atherosclerosis of aorta (HCC)     Agatston CAC score 200-399. 2/2023 364 02/24/2023    Vitreous floaters of both eyes 12/16/2022    Cerebral atrophy (HCC) 12/16/2022    Dry eye 08/04/2022    Numerous moles 04/04/2022    10 year risk of MI or stroke 7.5% or greater 04/04/2022    Plantar wart 03/07/2022    Waldenstrom macroglobulinemia (HCC) 11/29/2021    Seborrheic keratoses 04/13/2021    DDD (degenerative disc disease), lumbar 05/09/2017    Lumbar spinal stenosis 05/09/2017    Hypothyroidism 05/09/2017    IGT (impaired glucose tolerance) 05/09/2017    DDD (degenerative disc disease), cervical 10/16/2016    Hernia, inguinal, left 04/08/2016       Current Outpatient Medications   Medication Sig Dispense Refill    rosuvastatin (CRESTOR) 10 MG Tab Take 1 Tablet by mouth every evening. 100 Tablet 3    levothyroxine (SYNTHROID) 50 MCG Tab Take 1 Tablet by mouth every morning on an empty stomach. 100 Tablet 3    GAVILYTE-G 236 g Recon Soln       levothyroxine (SYNTHROID) 25 MCG Tab Take  on Sundays with 50 mcg tablet. 12 Tablet 3    Coenzyme Q10 300 MG Cap Take 1 Capsule by mouth every day. 30 Capsule     B Complex Vitamins (B COMPLEX-B12 PO) Take  by mouth every day.      VITAMIN A PO Take  by mouth every day.      Cholecalciferol (VITAMIN D3 PO) Take  by mouth every day.       No current facility-administered medications for this visit.          Current supplements as per medication list.     Allergies: Patient has no known allergies.    Current social contact/activities: still working, travels often    He  reports that he has never smoked. He has never used smokeless tobacco. He reports current alcohol use of about 0.6 oz of alcohol per week. He reports that he does not use drugs.  Counseling given: Not Answered      ROS:    Gait: Uses no assistive device  Ostomy: No  Other tubes: No  Amputations: No  Chronic oxygen use: No  Last eye exam: unsure  Wears hearing aids: No   : Denies any urinary leakage during the last 6 months    Screening: UTD  Depression Screening  Little interest or pleasure in doing things?  0 - not at all  Feeling down, depressed , or hopeless? 0 - not at all  Patient Health Questionnaire Score: 0     If depressive symptoms identified deferred to follow up visit unless specifically addressed in assessment and plan.    Interpretation of PHQ-9 Total Score   Score Severity   1-4 No Depression   5-9 Mild Depression   10-14 Moderate Depression   15-19 Moderately Severe Depression   20-27 Severe Depression    Screening for Cognitive Impairment  Do you or any of your friends or family members have any concern about your memory? No  Three Minute Recall (Banana, Sunrise, Chair) 3/3    Aguilar clock face with all 12 numbers and set the hands to show 20 past 8.  Yes    Cognitive concerns identified deferred for follow up unless specifically addressed in assessment and plan.    Fall Risk Assessment  Has the patient had two or more falls in the last year or any fall with injury in the last  year?  No    Safety Assessment  Do you always wear your seatbelt?  Yes  Any changes to home needed to function safely? No  Difficulty hearing.  Yes  Patient counseled about all safety risks that were identified.    Functional Assessment ADLs  Are there any barriers preventing you from cooking for yourself or meeting nutritional needs?  No.    Are there any barriers preventing you from driving safely or obtaining transportation?  No.    Are there any barriers preventing you from using a telephone or calling for help?  No    Are there any barriers preventing you from shopping?  No.    Are there any barriers preventing you from taking care of your own finances?  No    Are there any barriers preventing you from managing your medications?  No    Are there any barriers preventing you from showering, bathing or dressing yourself? No    Are there any barriers preventing you from doing housework or laundry? No  Are there any barriers preventing you from using the toilet?No  Are you currently engaging in any exercise or physical activity?  Yes.      Self-Assessment of Health  What is your perception of your health? Excellent  Do you sleep more than six hours a night? Yes  In the past 7 days, how much did pain keep you from doing your normal work? None  Do you spend quality time with family or friends (virtually or in person)? Yes  Do you usually eat a heart healthy diet that constists of a variety of fruits, vegetables, whole grains and fiber? Yes  Do you eat foods high in fat and/or Fast Food more than three times per week? No    Advance Care Planning  Do you have an Advance Directive, Living Will, Durable Power of , or POLST? No                 Health Maintenance Summary            Ordered - Colorectal Cancer Screening (Colonoscopy - Every 5 Years) Ordered on 10/9/2023      02/14/2018  OCCULT BLOOD FECES IMMUNOASSAY    10/27/2014  REFERRAL TO GI FOR COLONOSCOPY              Overdue - COVID-19 Vaccine (7 - 2023-24  season) Overdue since 2023  Imm Admin: PFIZER BIVALENT SARS-COV-2 VACCINE (12+)    10/11/2022  Imm Admin: MODERNA BIVALENT BOOSTER SARS-COV-2 VACCINE (6+)    2022  Imm Admin: MODERNA SARS-COV-2 VACCINE (12+)    2021  Imm Admin: MODERNA SARS-COV-2 VACCINE (12+)    2021  Imm Admin: MODERNA SARS-COV-2 VACCINE (12+)    Only the first 5 history entries have been loaded, but more history exists.              Annual Wellness Visit (Yearly) Next due on 2024  Done    2022  Done    10/14/2021  Subsequent Annual Wellness Visit - Includes PPPS ()    10/14/2021  Visit Dx: Medicare annual wellness visit, subsequent    2016  Level of Service: PB PBEVENTIVE VISIT,NEW,65 & OVER              IMM DTaP/Tdap/Td Vaccine (2 - Td or Tdap) Next due on 2021  Imm Admin: Tdap Vaccine              Hepatitis A Vaccine (Hep A) (Series Information) Aged Out      2020  Imm Admin: Hep A/HEP B Combined Vaccine (TwinRix)    2019  Imm Admin: Hep A/HEP B Combined Vaccine (TwinRix)    10/30/2019  Imm Admin: Hep A/HEP B Combined Vaccine (TwinRix)              Hepatitis B Vaccine (Hep B) (Series Information) Completed      2020  Imm Admin: Hep A/HEP B Combined Vaccine (TwinRix)    2019  Imm Admin: Hep A/HEP B Combined Vaccine (TwinRix)    10/30/2019  Imm Admin: Hep A/HEP B Combined Vaccine (TwinRix)              Zoster (Shingles) Vaccines (Series Information) Completed      2021  Imm Admin: Zoster Vaccine Recombinant (RZV) (SHINGRIX)    2020  Imm Admin: Zoster Vaccine Recombinant (RZV) (SHINGRIX)    2016  Imm Admin: Zoster Vaccine Live (ZVL) (Zostavax) - HISTORICAL DATA              Hepatitis C Screening  Tentatively Complete      2021  Hepatitis C Antibody component of HCV Scrn ( [id number] 1xLife)              Pneumococcal Vaccine: 65+ Years (Series Information) Completed      2022  Imm Admin:  Pneumococcal polysaccharide vaccine (PPSV-23)    08/16/2016  Imm Admin: Pneumococcal Conjugate Vaccine (Prevnar/PCV-13)              Influenza Vaccine (Series Information) Completed      10/31/2023  Imm Admin: Influenza Vaccine Adult HD    12/16/2022  Imm Admin: Influenza Vaccine Adult HD    10/14/2021  Imm Admin: Influenza Vaccine Adult HD    09/29/2020  Imm Admin: Influenza Vaccine Adult HD    10/30/2019  Imm Admin: Influenza Vaccine Adult HD              HPV Vaccines (Series Information) Aged Out      No completion history exists for this topic.              Polio Vaccine (Inactivated Polio) (Series Information) Aged Out      No completion history exists for this topic.              Meningococcal Immunization (Series Information) Aged Out      No completion history exists for this topic.                    Patient Care Team:  Etta Joe P.A.-C. as PCP - General (Physician Assistant)  Carson Pineda M.D. (Medical Oncology)  Sandra Merida as Patient Advocate (Pharmacy)    Social History     Tobacco Use    Smoking status: Never    Smokeless tobacco: Never   Vaping Use    Vaping Use: Some days   Substance Use Topics    Alcohol use: Yes     Alcohol/week: 0.6 oz     Types: 1 Glasses of wine per week     Comment: 4-5/week; beer once in a while. 1/18/22: 1 drink per day.    Drug use: No     Family History   Problem Relation Age of Onset    Cancer Mother 50        breast    Cancer Paternal Grandmother 70        colon cancer     He  has a past medical history of Cataract, DDD (degenerative disc disease), cervical, DDD (degenerative disc disease), cervical, DDD (degenerative disc disease), lumbar, Dyslipidemia, Heart murmur, Hiatus hernia syndrome, Hyponatremia (4/13/2021), Lumbar spinal stenosis, and Vitamin D deficiency (2/2/2018).   Past Surgical History:   Procedure Laterality Date    TX DX BONE MARROW ASPIRATIONS Left 1/21/2022    Procedure: ASPIRATION, BONE MARROW - DR PINEDA;  Surgeon: Tyrel Mcintyre M.D.;   "Location: SURGERY SAME DAY [facility];  Service: Orthopedics    SC DX BONE MARROW BIOPSIES Left 1/21/2022    Procedure: BIOPSY, BONE MARROW, USING NEEDLE OR TROCAR;  Surgeon: Tyrel Mcintyre M.D.;  Location: SURGERY SAME DAY [facility];  Service: Orthopedics    INGUINAL HERNIA REPAIR ROBOTIC Left 4/8/2016    Procedure: INGUINAL HERNIA REPAIR ROBOTIC WITH MESH;  Surgeon: Dustin Maria M.D.;  Location: SURGERY SAME DAY [facility];  Service:     OTHER  2006    alonso iol    CATARACT EXTRACTION WITH IOL Bilateral     HERNIA REPAIR      bilat       Exam:   /60 (BP Location: Left arm, Patient Position: Sitting, BP Cuff Size: Adult)   Pulse 69   Temp 36.7 °C (98 °F) (Temporal)   Resp 16   Ht 1.778 m (5' 10\")   Wt 63.5 kg (140 lb)   SpO2 98%  Body mass index is 20.09 kg/m².    Hearing fair.    Dentition good  Alert, oriented in no acute distress.  Eye contact is good, speech goal directed, affect calm    Assessment and Plan. The following treatment and monitoring plan is recommended:      1. Encounter for annual wellness exam in Medicare patient  PMH/PSH/FH/Social history reviewed.  Vaccinations discussed.  Previous records and labs reviewed. Discussed age appropriate anticipatory guidance.    2. Waldenstrom macroglobulinemia (HCC)  Chronic, controlled and stable. Continue current regimen -monitoring by hematology.  - CBC WITH DIFFERENTIAL; Future    3. Atherosclerosis of aorta (HCC)  Chronic, controlled and stable. Continue current regimen -blood pressure and cholesterol control.  Continue statin.  - US-ABDOMEN COMPLETE SURVEY; Future  - MR-BRAIN-WITH & W/O; Future    4. Cerebral atrophy (HCC)  Chronic, controlled and stable. Continue current regimen -blood pressure, blood sugar, and cholesterol control.  Will check updated MRI brain.  - MR-BRAIN-WITH & W/O; Future    5. IGT (impaired glucose tolerance)  Your A1c, which is a diabetes marker, is within the prediabetes/impaired glucose tolerance range.  Please follow " a low-carb Mediterranean diet and I will continue to monitor that value.   - Comp Metabolic Panel; Future  - HEMOGLOBIN A1C; Future    6. Dyslipidemia  Please continue working on lifestyle modifications.  There are 4 types of exercise that are recommended.  Endurance training includes 150 minutes or more of moderate-intensity activity each week as tolerated.  It is also recommended to incorporate exercises to help with flexibility and balance twice per week, and weight/resistance training twice per week.  The benefits of weight training include increased strength of the bones, muscles, and connective tissues which ultimately helps to stabilize your joints, lower risk of injury, decrease fall risk, improve your metabolism, and improve quality of life long-term.  If you are weightlifting twice a week, one day can be focus more on your lower body and the other on your upper body.  If you need assistance with a weight training program, please consult with a certified  or schedule a consultation in my office for guidance.    Additionally, please follow a healthy diet that is low in saturated fat, sodium, and cholesterol, such as the mediterranean diet.  Please focus on a diet that is high in fruits, vegetables, whole grains, beans, nuts, and seeds, includes olive oil as an important source of monounsaturated fat. You may also consider a plant-based diet.  Please also increase your dietary fiber intake to 25 to 30 g a day and limit added sugar to <30 g/day.      In regards to alcohol intake, the 2020 to 2025 Dietary Guidelines for Americans advise no more than two drinks per day for males and one drink per day for nonpregnant females, although complete cessation would be ideal to reduce risks of long term health effects from alcohol.    Please consume at least 5-6 servings of fruits and vegetables per day. Studies show that consuming 5 servings of fruits and vegetables per day can increase life expectancy by 3  years.  Even eating 2.5 servings of fruits and vegetables per day can be associated with a 16% reduced risk of heart disease, 18% reduced risk of stroke, 4% reduced risk of cancer, and 15% reduced risk of premature death.    I would also recommend protein consumption with every meal.  Protein sources include beans, lentils, chickpeas, nuts and seeds, eggs, high protein yogurt (>15 g), cottage cheese, lean meats such as salmon and tuna, soy products i.e. tofu, as well as supplementary protein powders that are low in added sugar.    7. Presbycusis of both ears  - Referral to Audiology    8. Tinnitus of both ears  Chronic, unchanged.  - MR-BRAIN-WITH & W/O; Future    9. Hypothyroidism, unspecified type  Chronic, controlled and stable. Continue current regimen -levothyroxine.  - TSH WITH REFLEX TO FT4; Future    10. Abdominal bloating  Follow-up after completion of tests for complete exam.  - US-ABDOMEN COMPLETE SURVEY; Future  - LIPASE; Future    11. Anemia, unspecified type  - CBC WITH DIFFERENTIAL; Future  - IRON/TOTAL IRON BIND; Future  - FERRITIN; Future    12. Positional lightheadedness  Chronic, increase fluids.  Check MRI brain for completeness. Previous testing  - MR-BRAIN-WITH & W/O; Future    13. Prostate cancer screening  - PROSTATE SPECIFIC AG SCREENING; Future    14. Physician orders for life-sustaining treatment (POLST) form indicates patient wish for full code resuscitation status    Services suggested: No services needed at this time  Health Care Screening: Age-appropriate preventive services recommended by USPTF and ACIP covered by Medicare were discussed today. Services ordered if indicated and agreed upon by the patient.  Referrals offered: Community-based lifestyle interventions to reduce health risks and promote self-management and wellness, fall prevention, nutrition, physical activity, tobacco-use cessation, weight loss, and mental health services as per orders if indicated.  Updated handicap  placard.    Discussion today about general wellness and lifestyle habits:    Prevent falls and reduce trip hazards; Cautioned about securing or removing rugs.  Have a working fire alarm and carbon monoxide detector;   Engage in regular physical activity and social activities     Follow-up: Return for As scheduled.

## 2024-01-23 NOTE — PATIENT INSTRUCTIONS
[facility]  [address] [facility]  [place] [address]  Phone: [phone]    [facility]  [address] [place] [address]  Phone: [phone]

## 2024-02-16 ENCOUNTER — HOSPITAL ENCOUNTER (OUTPATIENT)
Dept: LAB | Facility: MEDICAL CENTER | Age: 79
End: 2024-02-16
Attending: INTERNAL MEDICINE
Payer: MEDICARE

## 2024-02-16 ENCOUNTER — HOSPITAL ENCOUNTER (OUTPATIENT)
Dept: LAB | Facility: MEDICAL CENTER | Age: 79
End: 2024-02-16
Attending: PHYSICIAN ASSISTANT
Payer: MEDICARE

## 2024-02-16 DIAGNOSIS — Z12.5 PROSTATE CANCER SCREENING: ICD-10-CM

## 2024-02-16 DIAGNOSIS — D64.9 ANEMIA, UNSPECIFIED TYPE: ICD-10-CM

## 2024-02-16 DIAGNOSIS — R73.02 IGT (IMPAIRED GLUCOSE TOLERANCE): ICD-10-CM

## 2024-02-16 DIAGNOSIS — E03.9 HYPOTHYROIDISM, UNSPECIFIED TYPE: ICD-10-CM

## 2024-02-16 DIAGNOSIS — C88.0 WALDENSTROM MACROGLOBULINEMIA (HCC): ICD-10-CM

## 2024-02-16 DIAGNOSIS — R14.0 ABDOMINAL BLOATING: ICD-10-CM

## 2024-02-16 LAB
ALBUMIN SERPL BCP-MCNC: 4.4 G/DL (ref 3.2–4.9)
ALBUMIN/GLOB SERPL: 1.3 G/DL
ALP SERPL-CCNC: 51 U/L (ref 30–99)
ALT SERPL-CCNC: 15 U/L (ref 2–50)
ANION GAP SERPL CALC-SCNC: 15 MMOL/L (ref 7–16)
AST SERPL-CCNC: 20 U/L (ref 12–45)
BASOPHILS # BLD AUTO: 0.8 % (ref 0–1.8)
BASOPHILS # BLD: 0.05 K/UL (ref 0–0.12)
BILIRUB SERPL-MCNC: 0.4 MG/DL (ref 0.1–1.5)
BUN SERPL-MCNC: 17 MG/DL (ref 8–22)
CALCIUM ALBUM COR SERPL-MCNC: 9.2 MG/DL (ref 8.5–10.5)
CALCIUM SERPL-MCNC: 9.5 MG/DL (ref 8.5–10.5)
CHLORIDE SERPL-SCNC: 100 MMOL/L (ref 96–112)
CO2 SERPL-SCNC: 23 MMOL/L (ref 20–33)
CREAT SERPL-MCNC: 0.88 MG/DL (ref 0.5–1.4)
EOSINOPHIL # BLD AUTO: 0.13 K/UL (ref 0–0.51)
EOSINOPHIL NFR BLD: 2.2 % (ref 0–6.9)
ERYTHROCYTE [DISTWIDTH] IN BLOOD BY AUTOMATED COUNT: 46.1 FL (ref 35.9–50)
EST. AVERAGE GLUCOSE BLD GHB EST-MCNC: 131 MG/DL
FERRITIN SERPL-MCNC: 104 NG/ML (ref 22–322)
GFR SERPLBLD CREATININE-BSD FMLA CKD-EPI: 88 ML/MIN/1.73 M 2
GLOBULIN SER CALC-MCNC: 3.3 G/DL (ref 1.9–3.5)
GLUCOSE SERPL-MCNC: 104 MG/DL (ref 65–99)
HBA1C MFR BLD: 6.2 % (ref 4–5.6)
HCT VFR BLD AUTO: 38.8 % (ref 42–52)
HGB BLD-MCNC: 13.5 G/DL (ref 14–18)
IMM GRANULOCYTES # BLD AUTO: 0.02 K/UL (ref 0–0.11)
IMM GRANULOCYTES NFR BLD AUTO: 0.3 % (ref 0–0.9)
IRON SATN MFR SERPL: 38 % (ref 15–55)
IRON SERPL-MCNC: 102 UG/DL (ref 50–180)
LIPASE SERPL-CCNC: 15 U/L (ref 11–82)
LYMPHOCYTES # BLD AUTO: 2.39 K/UL (ref 1–4.8)
LYMPHOCYTES NFR BLD: 40.1 % (ref 22–41)
MCH RBC QN AUTO: 33.8 PG (ref 27–33)
MCHC RBC AUTO-ENTMCNC: 34.8 G/DL (ref 32.3–36.5)
MCV RBC AUTO: 97 FL (ref 81.4–97.8)
MONOCYTES # BLD AUTO: 0.71 K/UL (ref 0–0.85)
MONOCYTES NFR BLD AUTO: 11.9 % (ref 0–13.4)
NEUTROPHILS # BLD AUTO: 2.66 K/UL (ref 1.82–7.42)
NEUTROPHILS NFR BLD: 44.7 % (ref 44–72)
NRBC # BLD AUTO: 0 K/UL
NRBC BLD-RTO: 0 /100 WBC (ref 0–0.2)
PLATELET # BLD AUTO: 284 K/UL (ref 164–446)
PMV BLD AUTO: 8.8 FL (ref 9–12.9)
POTASSIUM SERPL-SCNC: 4.1 MMOL/L (ref 3.6–5.5)
PROT SERPL-MCNC: 7.7 G/DL (ref 6–8.2)
PSA SERPL-MCNC: 0.28 NG/ML (ref 0–4)
RBC # BLD AUTO: 4 M/UL (ref 4.7–6.1)
SODIUM SERPL-SCNC: 138 MMOL/L (ref 135–145)
TIBC SERPL-MCNC: 267 UG/DL (ref 250–450)
TSH SERPL DL<=0.005 MIU/L-ACNC: 3.76 UIU/ML (ref 0.38–5.33)
UIBC SERPL-MCNC: 165 UG/DL (ref 110–370)
WBC # BLD AUTO: 6 K/UL (ref 4.8–10.8)

## 2024-02-16 PROCEDURE — 36415 COLL VENOUS BLD VENIPUNCTURE: CPT

## 2024-02-16 PROCEDURE — 80053 COMPREHEN METABOLIC PANEL: CPT

## 2024-02-16 PROCEDURE — 84165 PROTEIN E-PHORESIS SERUM: CPT

## 2024-02-16 PROCEDURE — 85025 COMPLETE CBC W/AUTO DIFF WBC: CPT

## 2024-02-16 PROCEDURE — 84153 ASSAY OF PSA TOTAL: CPT

## 2024-02-16 PROCEDURE — 84443 ASSAY THYROID STIM HORMONE: CPT

## 2024-02-16 PROCEDURE — 82784 ASSAY IGA/IGD/IGG/IGM EACH: CPT

## 2024-02-16 PROCEDURE — 82728 ASSAY OF FERRITIN: CPT

## 2024-02-16 PROCEDURE — 86334 IMMUNOFIX E-PHORESIS SERUM: CPT

## 2024-02-16 PROCEDURE — 84155 ASSAY OF PROTEIN SERUM: CPT | Mod: XU

## 2024-02-16 PROCEDURE — 83540 ASSAY OF IRON: CPT

## 2024-02-16 PROCEDURE — 83550 IRON BINDING TEST: CPT

## 2024-02-16 PROCEDURE — 83690 ASSAY OF LIPASE: CPT

## 2024-02-16 PROCEDURE — 83521 IG LIGHT CHAINS FREE EACH: CPT

## 2024-02-16 PROCEDURE — 83036 HEMOGLOBIN GLYCOSYLATED A1C: CPT

## 2024-02-18 NOTE — PROGRESS NOTES
02/128/24    Subjective    Chief Complaint:  Follow up Waldenstrom's macroglobulinemia    HPI:  78 male  with Waldenstrom's on no therapy as yet. Lab is pretty stable, minimal increase in kappa light chains. IgM is no higher than almost 2 years ago.     ROS:    Constitutional: No weight loss  Skin: No rash or jaundice  HENT: No change in eyesight or hearing  Cardiovascular:No chest pain or arrythmia  Respiratory:No cough or SOB  GI:No nausea, vomiting, diarrhea, constipation  :No dysuria or frequency  Musculoskeletal:No bone or joint pain  Neuro:No sx's of neuropathy  Psych: No complaints    PMH:      No Known Allergies    Past Medical History:   Diagnosis Date    Cataract     alonso iol    DDD (degenerative disc disease), cervical     DDD (degenerative disc disease), cervical     DDD (degenerative disc disease), lumbar     Dyslipidemia     Heart murmur     Hiatus hernia syndrome     Hyponatremia 4/13/2021    Lumbar spinal stenosis     Vitamin D deficiency 2/2/2018        Past Surgical History:   Procedure Laterality Date    [place] DX BONE MARROW ASPIRATIONS Left 1/21/2022    Procedure: ASPIRATION, BONE MARROW - DR SANCHEZ;  Surgeon: Tyrel Mcintyre M.D.;  Location: SURGERY SAME DAY [facility];  Service: Orthopedics    CA DX BONE MARROW BIOPSIES Left 1/21/2022    Procedure: BIOPSY, BONE MARROW, USING NEEDLE OR TROCAR;  Surgeon: Tyrel Mcintyre M.D.;  Location: SURGERY SAME DAY [facility];  Service: Orthopedics    INGUINAL HERNIA REPAIR ROBOTIC Left 4/8/2016    Procedure: INGUINAL HERNIA REPAIR ROBOTIC WITH MESH;  Surgeon: Dustin Maria M.D.;  Location: SURGERY SAME DAY [facility];  Service:     OTHER  2006    alonso iol    CATARACT EXTRACTION WITH IOL Bilateral     HERNIA REPAIR      bilat        Medications:    Current Outpatient Medications on File Prior to Encounter   Medication Sig Dispense Refill    rosuvastatin (CRESTOR) 10 MG Tab Take 1 Tablet by mouth every evening. 100 Tablet 3    levothyroxine  "(SYNTHROID) 50 MCG Tab Take 1 Tablet by mouth every morning on an empty stomach. 100 Tablet 3    levothyroxine (SYNTHROID) 25 MCG Tab Take on Sundays with 50 mcg tablet. 12 Tablet 3    B Complex Vitamins (B COMPLEX-B12 PO) Take  by mouth every day.      VITAMIN A PO Take  by mouth every day.      Cholecalciferol (VITAMIN D3 PO) Take  by mouth every day.      GAVILYTE-G 236 g Recon Soln  (Patient not taking: Reported on 2/28/2024)      Coenzyme Q10 300 MG Cap Take 1 Capsule by mouth every day. (Patient not taking: Reported on 2/28/2024) 30 Capsule      No current facility-administered medications on file prior to encounter.       Social History     Tobacco Use    Smoking status: Never    Smokeless tobacco: Never   Substance Use Topics    Alcohol use: Yes     Alcohol/week: 0.6 oz     Types: 1 Glasses of wine per week     Comment: 4-5/week; beer once in a while. 1/18/22: 1 drink per day.        Family History   Problem Relation Age of Onset    Cancer Mother 50        breast    Cancer Paternal Grandmother 70        colon cancer        Objective    Vitals:    /62 (BP Location: Left arm, Patient Position: Sitting, BP Cuff Size: Adult)   Pulse 63   Temp 36.6 °C (97.9 °F) (Temporal)   Resp 12   Ht 1.778 m (5' 10\")   Wt 64.8 kg (142 lb 12 oz)   SpO2 97%   BMI 20.48 kg/m²     Physical Exam:    Appears well-developed and well-nourished. No distress.    Head -  Normocephalic .   Eyes - Pupils are equal. Conjunctivae normal. No scleral icterus.   Ears - normal hearing  Abdominal -Soft. No distension, tenderness, organomegaly or mass  Extremities-  No edema or tenderness.     Neurological -   Alert and oriented.  Skin - Skin is warm and dry. No rash noted. Not diaphoretic. No erythema. No pallor. No jaundice   Psychiatric -  Normal mood and affect.    Labs:    Latest Reference Range & Units 01/31/23 08:09 06/30/23 07:18 08/17/23 10:42 02/16/24 07:46   WBC 4.8 - 10.8 K/uL 6.5 6.0 6.6 6.0   RBC 4.70 - 6.10 M/uL 4.09 " (L) 4.14 (L) 4.18 (L) 4.00 (L)   Hemoglobin 14.0 - 18.0 g/dL 13.7 (L) 13.6 (L) 13.8 (L) 13.5 (L)   Hematocrit 42.0 - 52.0 % 40.2 (L) 39.9 (L) 40.4 (L) 38.8 (L)   MCV 81.4 - 97.8 fL 98.3 (H) 96.4 96.7 97.0   MCH 27.0 - 33.0 pg 33.5 (H) 32.9 33.0 33.8 (H)   MCHC 32.3 - 36.5 g/dL 34.1 34.1 34.2 34.8   RDW 35.9 - 50.0 fL 46.4 43.9 46.2 46.1      Latest Reference Range & Units 02/16/24 07:46   Sodium 135 - 145 mmol/L 138   Potassium 3.6 - 5.5 mmol/L 4.1   Chloride 96 - 112 mmol/L 100   Co2 20 - 33 mmol/L 23   Anion Gap 7.0 - 16.0  15.0   Glucose 65 - 99 mg/dL 104 (H)   Bun 8 - 22 mg/dL 17   Creatinine 0.50 - 1.40 mg/dL 0.88   GFR (CKD-EPI) >60 mL/min/1.73 m 2 88   Calcium 8.5 - 10.5 mg/dL 9.5   Correct Calcium 8.5 - 10.5 mg/dL 9.2   AST(SGOT) 12 - 45 U/L 20   ALT(SGPT) 2 - 50 U/L 15   Alkaline Phosphatase 30 - 99 U/L 51   Total Bilirubin 0.1 - 1.5 mg/dL 0.4   Albumin 3.2 - 4.9 g/dL 4.4   Total Protein 6.0 - 8.2 g/dL 7.7   Globulin 1.9 - 3.5 g/dL 3.3   A-G Ratio g/dL 1.3      Latest Reference Range & Units 02/16/24 07:46   Iron 50 - 180 ug/dL 102   Total Iron Binding 250 - 450 ug/dL 267   % Saturation 15 - 55 % 38      Latest Reference Range & Units 05/06/22 13:51 08/19/22 07:33 01/31/23 08:09 08/17/23 10:42 02/16/24 07:44   Immunoglobulin A 68 - 408 mg/dL 75 71 74 72 81   Immunoglobulin G 768 - 1632 mg/dL 771 793 883 809 786   Immunoglobulin M 35 - 263 mg/dL 1040 (H) 1108 (H) 1160 (H) 850 (H) 1082 (H)      Latest Reference Range & Units 05/06/22 13:51 08/19/22 07:33 01/31/23 08:09 08/17/23 10:42 02/16/24 07:44   Free Kappa Light Chains 3.30 - 19.40 mg/L 31.93 (H) 37.69 (H) 36.99 (H) 40.39 (H) 45.72 (H)   Free Lambda Light Chains 5.71 - 26.30 mg/L 10.71 8.58 8.06 7.31 7.90   Kappa-Lambda Ratio 0.26 - 1.65  2.98 (H) 4.39 (H) 4.59 (H) 5.53 (H) 5.79 (H)      Latest Reference Range & Units 08/17/23 10:42   Viscosity 1.10 - 1.80 cP 1.23     Assessment    Imp:    Visit Diagnosis:    1. Waldenstrom macroglobulinemia (HCC)  CBC  WITH DIFFERENTIAL    Comp Metabolic Panel    Monoclonal Protein and FLC, Serum          Plan:  Recheck lab in 6 months    Carson Pineda M.D.

## 2024-02-19 LAB
ALBUMIN SERPL ELPH-MCNC: 4.23 G/DL (ref 3.75–5.01)
ALPHA1 GLOB SERPL ELPH-MCNC: 0.26 G/DL (ref 0.19–0.46)
ALPHA2 GLOB SERPL ELPH-MCNC: 0.75 G/DL (ref 0.48–1.05)
B-GLOBULIN SERPL ELPH-MCNC: 0.61 G/DL (ref 0.48–1.1)
EER MONOCLONAL PROTEIN AND FLC, SERUM Q5224: ABNORMAL
GAMMA GLOB SERPL ELPH-MCNC: 1.45 G/DL (ref 0.62–1.51)
IGA SERPL-MCNC: 81 MG/DL (ref 68–408)
IGG SERPL-MCNC: 786 MG/DL (ref 768–1632)
IGM SERPL-MCNC: 1082 MG/DL (ref 35–263)
INTERPRETATION SERPL IFE-IMP: ABNORMAL
INTERPRETATION SERPL IFE-IMP: ABNORMAL
KAPPA LC FREE SER-MCNC: 45.72 MG/L (ref 3.3–19.4)
KAPPA LC FREE/LAMBDA FREE SER NEPH: 5.79 {RATIO} (ref 0.26–1.65)
LAMBDA LC FREE SERPL-MCNC: 7.9 MG/L (ref 5.71–26.3)
MONOCLONAL PROTEIN NL11656: 0.94 G/DL
PROT SERPL-MCNC: 7.3 G/DL (ref 6.3–8.2)

## 2024-02-22 ENCOUNTER — HOSPITAL ENCOUNTER (OUTPATIENT)
Dept: RADIOLOGY | Facility: MEDICAL CENTER | Age: 79
End: 2024-02-22
Attending: PHYSICIAN ASSISTANT
Payer: MEDICARE

## 2024-02-22 DIAGNOSIS — I70.0 ATHEROSCLEROSIS OF AORTA (HCC): Chronic | ICD-10-CM

## 2024-02-22 DIAGNOSIS — R14.0 ABDOMINAL BLOATING: ICD-10-CM

## 2024-02-22 PROCEDURE — 76700 US EXAM ABDOM COMPLETE: CPT

## 2024-02-28 ENCOUNTER — HOSPITAL ENCOUNTER (OUTPATIENT)
Dept: HEMATOLOGY ONCOLOGY | Facility: MEDICAL CENTER | Age: 79
End: 2024-02-28
Attending: INTERNAL MEDICINE
Payer: MEDICARE

## 2024-02-28 VITALS
OXYGEN SATURATION: 97 % | HEART RATE: 63 BPM | DIASTOLIC BLOOD PRESSURE: 62 MMHG | RESPIRATION RATE: 12 BRPM | WEIGHT: 142.75 LBS | SYSTOLIC BLOOD PRESSURE: 118 MMHG | HEIGHT: 70 IN | TEMPERATURE: 97.9 F | BODY MASS INDEX: 20.44 KG/M2

## 2024-02-28 DIAGNOSIS — C88.0 WALDENSTROM MACROGLOBULINEMIA (HCC): ICD-10-CM

## 2024-02-28 PROCEDURE — 99212 OFFICE O/P EST SF 10 MIN: CPT | Performed by: INTERNAL MEDICINE

## 2024-02-28 PROCEDURE — 99213 OFFICE O/P EST LOW 20 MIN: CPT | Performed by: INTERNAL MEDICINE

## 2024-02-28 ASSESSMENT — FIBROSIS 4 INDEX: FIB4 SCORE: 1.4

## 2024-03-01 ENCOUNTER — HOSPITAL ENCOUNTER (OUTPATIENT)
Dept: RADIOLOGY | Facility: MEDICAL CENTER | Age: 79
End: 2024-03-01
Attending: PHYSICIAN ASSISTANT
Payer: MEDICARE

## 2024-03-01 DIAGNOSIS — R42 POSITIONAL LIGHTHEADEDNESS: ICD-10-CM

## 2024-03-01 DIAGNOSIS — H93.13 TINNITUS OF BOTH EARS: ICD-10-CM

## 2024-03-01 DIAGNOSIS — G31.9 CEREBRAL ATROPHY (HCC): ICD-10-CM

## 2024-03-01 DIAGNOSIS — I70.0 ATHEROSCLEROSIS OF AORTA (HCC): Chronic | ICD-10-CM

## 2024-03-01 PROCEDURE — A9579 GAD-BASE MR CONTRAST NOS,1ML: HCPCS | Performed by: PHYSICIAN ASSISTANT

## 2024-03-01 PROCEDURE — 70553 MRI BRAIN STEM W/O & W/DYE: CPT

## 2024-03-01 PROCEDURE — 700117 HCHG RX CONTRAST REV CODE 255: Performed by: PHYSICIAN ASSISTANT

## 2024-03-01 RX ADMIN — GADOTERIDOL 14 ML: 279.3 INJECTION, SOLUTION INTRAVENOUS at 10:34

## 2024-03-08 ENCOUNTER — TELEPHONE (OUTPATIENT)
Dept: HEALTH INFORMATION MANAGEMENT | Facility: OTHER | Age: 79
End: 2024-03-08

## 2024-03-12 ENCOUNTER — APPOINTMENT (OUTPATIENT)
Dept: MEDICAL GROUP | Facility: IMAGING CENTER | Age: 79
End: 2024-03-12
Payer: MEDICARE

## 2024-03-17 DIAGNOSIS — E03.9 HYPOTHYROIDISM, UNSPECIFIED TYPE: ICD-10-CM

## 2024-03-18 RX ORDER — LEVOTHYROXINE SODIUM 0.05 MG/1
50 TABLET ORAL
Qty: 100 TABLET | Refills: 3 | Status: SHIPPED | OUTPATIENT
Start: 2024-03-18

## 2024-03-22 ENCOUNTER — TELEMEDICINE (OUTPATIENT)
Dept: MEDICAL GROUP | Facility: IMAGING CENTER | Age: 79
End: 2024-03-22
Payer: MEDICARE

## 2024-03-22 VITALS — BODY MASS INDEX: 19.33 KG/M2 | TEMPERATURE: 97.5 F | HEIGHT: 70 IN | WEIGHT: 135 LBS

## 2024-03-22 DIAGNOSIS — D64.9 ANEMIA, UNSPECIFIED TYPE: ICD-10-CM

## 2024-03-22 DIAGNOSIS — R42 EPISODIC LIGHTHEADEDNESS: ICD-10-CM

## 2024-03-22 DIAGNOSIS — R73.02 IGT (IMPAIRED GLUCOSE TOLERANCE): ICD-10-CM

## 2024-03-22 PROCEDURE — 99214 OFFICE O/P EST MOD 30 MIN: CPT | Mod: 95 | Performed by: PHYSICIAN ASSISTANT

## 2024-03-22 ASSESSMENT — FIBROSIS 4 INDEX: FIB4 SCORE: 1.4

## 2024-03-22 NOTE — ASSESSMENT & PLAN NOTE
Patient with chronic mild anemia and history of Waldenström's macroglobulinemia.  Due for colon cancer screening.  States that he still needs to call [facility] back.  Had an abdominal ultrasound due to to some mild bloating, came back within normal limits.  Bloating has resolved.  No iron deficiency.  States he takes daily B12.

## 2024-03-22 NOTE — PATIENT INSTRUCTIONS
[facility]  [address] [address] [place] [address]  Phone: [phone]

## 2024-03-22 NOTE — ASSESSMENT & PLAN NOTE
Chronic, uncontrolled.  States that he knows he needs to work on his diet.  Has been recently traveling and not following his typical diet.  Very active.

## 2024-03-22 NOTE — PROGRESS NOTES
Virtual Visit: Established Patient   This visit was conducted via Zoom using secure and encrypted videoconferencing technology. The patient was in a private location in the state of [place].    The patient's identity was confirmed and verbal consent was obtained for this virtual visit.    Subjective:   CC:   Chief Complaint   Patient presents with    Lab Results       Chirag Damon Jr. is a 78 y.o. male presenting for evaluation and management of:    Anemia  Patient with chronic mild anemia and history of Waldenström's macroglobulinemia.  Due for colon cancer screening.  States that he still needs to call [facility] back.  Had an abdominal ultrasound due to to some mild bloating, came back within normal limits.  Bloating has resolved.  No iron deficiency.  States he takes daily B12.    IGT (impaired glucose tolerance)  Chronic, uncontrolled.  States that he knows he needs to work on his diet.  Has been recently traveling and not following his typical diet.  Very active.    Episodic lightheadedness  Patient states that since he was a child he has had occasional dizziness/lightheadedness with positional changes.  He states that he has paid more attention to this symptom as he is gotten older.  He states that he has noticed a slight increase in lightheadedness, but has focused on trying to move more slowly.  He does not check his blood pressure when he feels lightheaded.  Denies any chest pain or palpitations.    ROS   Denies any recent fevers or chills. No nausea or vomiting. No chest pains or shortness of breath.     No Known Allergies    Current medicines (including changes today)  Current Outpatient Medications   Medication Sig Dispense Refill    levothyroxine (SYNTHROID) 50 MCG Tab Take 1 Tablet by mouth every morning on an empty stomach. 100 Tablet 3    rosuvastatin (CRESTOR) 10 MG Tab Take 1 Tablet by mouth every evening. 100 Tablet 3    levothyroxine (SYNTHROID) 25 MCG Tab Take on Sundays with 50 mcg tablet. 12  Tablet 3    B Complex Vitamins (B COMPLEX-B12 PO) Take  by mouth every day.      VITAMIN A PO Take  by mouth every day.      Cholecalciferol (VITAMIN D3 PO) Take  by mouth every day.       No current facility-administered medications for this visit.       Patient Active Problem List    Diagnosis Date Noted    Anemia 01/23/2024    Episodic lightheadedness 01/23/2024    Tinnitus of both ears 01/23/2024    Physician orders for life-sustaining treatment (POLST) form indicates patient wish for full code resuscitation status 01/23/2024    Dyslipidemia     Atherosclerosis of aorta (HCC)     Agatston CAC score 200-399. 2/2023 364 02/24/2023    Vitreous floaters of both eyes 12/16/2022    Cerebral atrophy (HCC) 12/16/2022    Dry eye 08/04/2022    Numerous moles 04/04/2022    10 year risk of MI or stroke 7.5% or greater 04/04/2022    Plantar wart 03/07/2022    Waldenstrom macroglobulinemia (HCC) 11/29/2021    Seborrheic keratoses 04/13/2021    DDD (degenerative disc disease), lumbar 05/09/2017    Lumbar spinal stenosis 05/09/2017    Hypothyroidism 05/09/2017    IGT (impaired glucose tolerance) 05/09/2017    DDD (degenerative disc disease), cervical 10/16/2016    Hernia, inguinal, left 04/08/2016       Family History   Problem Relation Age of Onset    Cancer Mother 50        breast    Cancer Paternal Grandmother 70        colon cancer       He  has a past medical history of Cataract, DDD (degenerative disc disease), cervical, DDD (degenerative disc disease), cervical, DDD (degenerative disc disease), lumbar, Dyslipidemia, Heart murmur, Hiatus hernia syndrome, Hyponatremia (4/13/2021), Lumbar spinal stenosis, and Vitamin D deficiency (2/2/2018).  He  has a past surgical history that includes other (2006); inguinal hernia repair robotic (Left, 4/8/2016); hernia repair; cataract extraction with iol (Bilateral); pr dx bone marrow aspirations (Left, 1/21/2022); and pr dx bone marrow biopsies (Left, 1/21/2022).         Objective:  "  Temp 36.4 °C (97.5 °F)   Ht 1.778 m (5' 10\")   Wt 61.2 kg (135 lb)   BMI 19.37 kg/m²   RR 12    Physical Exam:  Constitutional: Alert, no distress, well-groomed.  Skin: No rashes in visible areas.  Eye: Round. Conjunctiva clear, lids normal. No icterus.   ENMT: Lips pink without lesions, good dentition, moist mucous membranes. Phonation normal.  Neck: No masses, no thyromegaly. Moves freely without pain.  Respiratory: Unlabored respiratory effort, no cough or audible wheeze  Psych: Alert and oriented x3, normal affect and mood.     Assessment and Plan:   The following treatment plan was discussed:     1. Episodic lightheadedness  With positional changes.  Let legs dangle before getting out of bed. I would recommend cardiac evaluation for completeness, orthostatics, echo, EKG, consider Holter?  Will leave per discretion of cardiology, has seen Dr. Bocanegra in the past.  Encouraged proper hydration and ~2300 g daily salt intake. MRI brain reviewed and within normal limits.  - REFERRAL TO CARDIOLOGY    2. Anemia, unspecified type  Chronic, suspect related to Waldenström's macroglobulinemia.  Stable.  Continue to monitor every 3 to 6 months.    3. IGT (impaired glucose tolerance)  Your A1c, which is a diabetes marker, is within the prediabetes/impaired glucose tolerance range.  Please follow a low-carb Mediterranean diet and I will continue to monitor that value.       Follow-up: Return in about 3 months (around 6/22/2024) for Follow-up.         Etta Joe PA-C ([facility])  Physician Assistant Certified  [facility]    Please note that this dictation was created using voice recognition software. I have made every reasonable attempt to correct obvious errors, but I expect that there are errors of grammar and possibly content that I did not discover before finalizing the note.  "

## 2024-03-22 NOTE — ASSESSMENT & PLAN NOTE
Patient states that since he was a child he has had occasional dizziness/lightheadedness with positional changes.  He states that he has paid more attention to this symptom as he is gotten older.  He states that he has noticed a slight increase in lightheadedness, but has focused on trying to move more slowly.  He does not check his blood pressure when he feels lightheaded.  Denies any chest pain or palpitations.

## 2024-04-08 DIAGNOSIS — I70.0 ATHEROSCLEROSIS OF AORTA (HCC): Chronic | ICD-10-CM

## 2024-04-08 RX ORDER — ROSUVASTATIN CALCIUM 10 MG/1
10 TABLET, COATED ORAL EVERY EVENING
Qty: 100 TABLET | Refills: 3 | Status: SHIPPED | OUTPATIENT
Start: 2024-04-08

## 2024-04-18 ENCOUNTER — TELEPHONE (OUTPATIENT)
Dept: HEALTH INFORMATION MANAGEMENT | Facility: OTHER | Age: 79
End: 2024-04-18
Payer: MEDICARE

## 2024-04-22 ENCOUNTER — OFFICE VISIT (OUTPATIENT)
Dept: DERMATOLOGY | Facility: IMAGING CENTER | Age: 79
End: 2024-04-22
Payer: MEDICARE

## 2024-04-22 DIAGNOSIS — L81.4 LENTIGINES: ICD-10-CM

## 2024-04-22 DIAGNOSIS — Z12.83 SKIN CANCER SCREENING: ICD-10-CM

## 2024-04-22 DIAGNOSIS — D23.9 DERMATOFIBROMA: ICD-10-CM

## 2024-04-22 DIAGNOSIS — L82.1 STUCCO KERATOSES: ICD-10-CM

## 2024-04-22 DIAGNOSIS — D18.01 CHERRY ANGIOMA: ICD-10-CM

## 2024-04-22 DIAGNOSIS — L82.1 SK (SEBORRHEIC KERATOSIS): ICD-10-CM

## 2024-04-22 DIAGNOSIS — D22.9 MULTIPLE NEVI: ICD-10-CM

## 2024-04-22 DIAGNOSIS — L57.0 ACTINIC KERATOSIS: ICD-10-CM

## 2024-04-22 PROCEDURE — 99213 OFFICE O/P EST LOW 20 MIN: CPT | Mod: 25 | Performed by: NURSE PRACTITIONER

## 2024-04-22 PROCEDURE — 17003 DESTRUCT PREMALG LES 2-14: CPT | Performed by: NURSE PRACTITIONER

## 2024-04-22 PROCEDURE — 17000 DESTRUCT PREMALG LESION: CPT | Performed by: NURSE PRACTITIONER

## 2024-04-22 NOTE — PROGRESS NOTES
DERMATOLOGY NOTE  NEW VISIT       Chief complaint: Establish Care     FRANCESCO     History of skin cancer: No  History of precancers/actinic keratoses: Yes, Details: chest, back,face   History of biopsies:No  History of blistering/severe sunburns:Yes, Details: child   Family history of skin cancer:No  Family history of atypical moles:No      No Known Allergies     MEDICATIONS:  Medications relevant to specialty reviewed.     REVIEW OF SYSTEMS:   Positive for skin (see HPI)  Negative for fevers and chills       EXAM:  There were no vitals taken for this visit.  Constitutional: Well-developed, well-nourished, and in no distress.     A total body skin exam was performed excluding the genitals per patient preference and including the following areas: head (including face), neck, chest, abdomen, groin/buttocks, back, bilateral upper extremities, and bilateral lower extremities with the following pertinent findings listed below and/or in assessment/plan.     -sun exposed skin of trunk and b/l upper, lower extremities and face with scattered clinically benign light brown reticulated macules all of which were morphologically similar and none of which were suspicious for skin cancer today on exam  Multiple tan medium and dark brown macules papules scattered over the trunk >> extremities--All with benign-appearing pigment network patterns on dermoscopy  Several tan medium brown stuck-on waxy papules scattered on the trunk, scalp face and extremities  Several scattered 1-3mm bright red macules and thin papules on the trunk, scalp and extremities  Ill-defined erythematous gritty/scaly papule over the  vertex scalp , bilateral temples , forehead   Stucco  keratosis bilateral extremities   DF left medial ankle     IMPRESSION / PLAN:    1. Actinic keratosis  CRYOTHERAPY:  Risks (including, but not limited to: skin discoloration, redness, blister, blood blister, recurrence, need for further treatment, infection, scar) and benefits of  cryotherapy discussed. Patient verbally agreed to proceed with treatment. 1 cryotherapy freeze thaw cycles of 10 seconds were applied to 9 lesions on face and  scalp as noted on exam with cryac. Patient tolerated procedure well. Aftercare instructions given--no specific care needed unless irritated during healing process, can apply Vaseline with small band-aid if needed.      2. Lentigines  - Benign-appearing nature of lesions discussed during exam.   - Advised to continue to monitor for any return to clinic for new or concerning changes.      3. Multiple nevi  - Benign-appearing nature of lesions discussed during exam.   - Advised to continue to monitor for any return to clinic for new or concerning changes.  - ABCDE's of melanoma discussed/handout given      4. SK (seborrheic keratosis)  - Benign-appearing nature of lesions discussed during exam.   - Advised to continue to monitor for any return to clinic for new or concerning changes.      5. Stucco keratoses  - Benign-appearing nature of lesions discussed during exam.   - Advised to continue to monitor for any return to clinic for new or concerning changes.      6. Cherry angioma  - Benign-appearing nature of lesions discussed during exam.   - Advised to continue to monitor for any return to clinic for new or concerning changes.      7. Skin cancer screening  Skin cancer education  discussed importance of sun protective clothing, eyewear in addition to the use of broad spectrum sunscreen with SPF 30 or greater, as well as need for reapplication ~every 2 hours when exposed to UVR/handout given  discussed importance following up for any new or changing lesions as noted in handout given, but every 12 months exams in clinic in the setting of dermatologic history  ABCDE's of melanoma discussed/handout given      8. Dermatofibroma  - Benign-appearing nature of lesions discussed during exam.   - Advised to continue to monitor for any return to clinic for new or  concerning changes.        Discussed risks associated with LN2, Patient verbalized understanding and agrees with plan regarding the above          Please note that this dictation was created using voice recognition software. I have made every reasonable attempt to correct obvious errors, but I expect that there are errors of grammar and possibly content that I did not discover before finalizing the note.      Return to clinic in: Return in about 1 year (around 4/22/2025) for FRANCESCO. and as needed for any new or changing skin lesions.

## 2024-06-26 DIAGNOSIS — E03.9 HYPOTHYROIDISM, UNSPECIFIED TYPE: ICD-10-CM

## 2024-06-26 NOTE — TELEPHONE ENCOUNTER
Received request via: Patient    Was the patient seen in the last year in this department? Yes    Does the patient have an active prescription (recently filled or refills available) for medication(s) requested? No    Pharmacy Name: [id number]    Does the patient have senior living Plus and need 100 day supply (blood pressure, diabetes and cholesterol meds only)? Medication is not for cholesterol, blood pressure or diabetes

## 2024-06-27 RX ORDER — LEVOTHYROXINE SODIUM 0.05 MG/1
50 TABLET ORAL
Qty: 100 TABLET | Refills: 3 | Status: SHIPPED | OUTPATIENT
Start: 2024-06-27

## 2024-07-08 ENCOUNTER — OFFICE VISIT (OUTPATIENT)
Dept: MEDICAL GROUP | Facility: IMAGING CENTER | Age: 79
End: 2024-07-08
Payer: MEDICARE

## 2024-07-08 VITALS
WEIGHT: 138 LBS | SYSTOLIC BLOOD PRESSURE: 114 MMHG | BODY MASS INDEX: 19.76 KG/M2 | DIASTOLIC BLOOD PRESSURE: 66 MMHG | TEMPERATURE: 97.4 F | HEART RATE: 70 BPM | HEIGHT: 70 IN | RESPIRATION RATE: 16 BRPM | OXYGEN SATURATION: 98 %

## 2024-07-08 DIAGNOSIS — R73.02 IGT (IMPAIRED GLUCOSE TOLERANCE): ICD-10-CM

## 2024-07-08 DIAGNOSIS — G31.9 CEREBRAL ATROPHY (HCC): ICD-10-CM

## 2024-07-08 DIAGNOSIS — R42 EPISODIC LIGHTHEADEDNESS: ICD-10-CM

## 2024-07-08 DIAGNOSIS — D64.9 ANEMIA, UNSPECIFIED TYPE: ICD-10-CM

## 2024-07-08 DIAGNOSIS — L82.1 SEBORRHEIC KERATOSES: ICD-10-CM

## 2024-07-08 DIAGNOSIS — I70.0 ATHEROSCLEROSIS OF AORTA (HCC): Chronic | ICD-10-CM

## 2024-07-08 DIAGNOSIS — E78.5 DYSLIPIDEMIA: ICD-10-CM

## 2024-07-08 PROBLEM — K05.30 PERIODONTITIS: Chronic | Status: RESOLVED | Noted: 2024-04-10 | Resolved: 2024-07-08

## 2024-07-08 PROCEDURE — 1126F AMNT PAIN NOTED NONE PRSNT: CPT | Performed by: PHYSICIAN ASSISTANT

## 2024-07-08 PROCEDURE — 99214 OFFICE O/P EST MOD 30 MIN: CPT | Mod: 25 | Performed by: PHYSICIAN ASSISTANT

## 2024-07-08 PROCEDURE — 17110 DESTRUCTION B9 LES UP TO 14: CPT | Performed by: PHYSICIAN ASSISTANT

## 2024-07-08 RX ORDER — POLYETHYLENE GLYCOL 3350, SODIUM SULFATE ANHYDROUS, SODIUM BICARBONATE, SODIUM CHLORIDE, POTASSIUM CHLORIDE 236; 22.74; 6.74; 5.86; 2.97 G/4L; G/4L; G/4L; G/4L; G/4L
POWDER, FOR SOLUTION ORAL
COMMUNITY
Start: 2024-06-06

## 2024-07-08 ASSESSMENT — PAIN SCALES - GENERAL: PAINLEVEL: NO PAIN

## 2024-07-08 ASSESSMENT — FIBROSIS 4 INDEX: FIB4 SCORE: 1.4

## 2024-07-16 ENCOUNTER — TELEPHONE (OUTPATIENT)
Dept: HEALTH INFORMATION MANAGEMENT | Facility: OTHER | Age: 79
End: 2024-07-16
Payer: MEDICARE

## 2024-07-18 ENCOUNTER — OFFICE VISIT (OUTPATIENT)
Dept: DERMATOLOGY | Facility: IMAGING CENTER | Age: 79
End: 2024-07-18
Payer: MEDICARE

## 2024-07-18 DIAGNOSIS — L82.1 SK (SEBORRHEIC KERATOSIS): ICD-10-CM

## 2024-07-18 PROCEDURE — 99212 OFFICE O/P EST SF 10 MIN: CPT | Performed by: NURSE PRACTITIONER

## 2024-07-23 ENCOUNTER — HOSPITAL ENCOUNTER (OUTPATIENT)
Dept: LAB | Facility: MEDICAL CENTER | Age: 79
End: 2024-07-23
Attending: PHYSICIAN ASSISTANT
Payer: MEDICARE

## 2024-07-23 ENCOUNTER — HOSPITAL ENCOUNTER (OUTPATIENT)
Dept: LAB | Facility: MEDICAL CENTER | Age: 79
End: 2024-07-23
Attending: INTERNAL MEDICINE
Payer: MEDICARE

## 2024-07-23 DIAGNOSIS — R73.02 IGT (IMPAIRED GLUCOSE TOLERANCE): ICD-10-CM

## 2024-07-23 DIAGNOSIS — I70.0 ATHEROSCLEROSIS OF AORTA (HCC): Chronic | ICD-10-CM

## 2024-07-23 DIAGNOSIS — D64.9 ANEMIA, UNSPECIFIED TYPE: ICD-10-CM

## 2024-07-23 DIAGNOSIS — E78.5 DYSLIPIDEMIA: ICD-10-CM

## 2024-07-23 DIAGNOSIS — C88.0 WALDENSTROM MACROGLOBULINEMIA (HCC): ICD-10-CM

## 2024-07-23 LAB
ALBUMIN SERPL BCP-MCNC: 4.5 G/DL (ref 3.2–4.9)
ALBUMIN/GLOB SERPL: 1.5 G/DL
ALP SERPL-CCNC: 54 U/L (ref 30–99)
ALT SERPL-CCNC: 17 U/L (ref 2–50)
ANION GAP SERPL CALC-SCNC: 16 MMOL/L (ref 7–16)
AST SERPL-CCNC: 22 U/L (ref 12–45)
BASOPHILS # BLD AUTO: 0.5 % (ref 0–1.8)
BASOPHILS # BLD: 0.04 K/UL (ref 0–0.12)
BILIRUB SERPL-MCNC: 0.7 MG/DL (ref 0.1–1.5)
BUN SERPL-MCNC: 15 MG/DL (ref 8–22)
CALCIUM ALBUM COR SERPL-MCNC: 9.5 MG/DL (ref 8.5–10.5)
CALCIUM SERPL-MCNC: 9.9 MG/DL (ref 8.5–10.5)
CHLORIDE SERPL-SCNC: 101 MMOL/L (ref 96–112)
CHOLEST SERPL-MCNC: 133 MG/DL (ref 100–199)
CO2 SERPL-SCNC: 21 MMOL/L (ref 20–33)
CREAT SERPL-MCNC: 0.92 MG/DL (ref 0.5–1.4)
EOSINOPHIL # BLD AUTO: 0.07 K/UL (ref 0–0.51)
EOSINOPHIL NFR BLD: 0.9 % (ref 0–6.9)
ERYTHROCYTE [DISTWIDTH] IN BLOOD BY AUTOMATED COUNT: 46.2 FL (ref 35.9–50)
EST. AVERAGE GLUCOSE BLD GHB EST-MCNC: 140 MG/DL
FASTING STATUS PATIENT QL REPORTED: NORMAL
FOLATE SERPL-MCNC: 10.6 NG/ML
GFR SERPLBLD CREATININE-BSD FMLA CKD-EPI: 85 ML/MIN/1.73 M 2
GLOBULIN SER CALC-MCNC: 3.1 G/DL (ref 1.9–3.5)
GLUCOSE SERPL-MCNC: 132 MG/DL (ref 65–99)
HBA1C MFR BLD: 6.5 % (ref 4–5.6)
HCT VFR BLD AUTO: 40.7 % (ref 42–52)
HDLC SERPL-MCNC: 68 MG/DL
HGB BLD-MCNC: 14.1 G/DL (ref 14–18)
IMM GRANULOCYTES # BLD AUTO: 0.02 K/UL (ref 0–0.11)
IMM GRANULOCYTES NFR BLD AUTO: 0.3 % (ref 0–0.9)
LDLC SERPL CALC-MCNC: 56 MG/DL
LYMPHOCYTES # BLD AUTO: 2.31 K/UL (ref 1–4.8)
LYMPHOCYTES NFR BLD: 29.1 % (ref 22–41)
MCH RBC QN AUTO: 33.9 PG (ref 27–33)
MCHC RBC AUTO-ENTMCNC: 34.6 G/DL (ref 32.3–36.5)
MCV RBC AUTO: 97.8 FL (ref 81.4–97.8)
MONOCYTES # BLD AUTO: 0.74 K/UL (ref 0–0.85)
MONOCYTES NFR BLD AUTO: 9.3 % (ref 0–13.4)
NEUTROPHILS # BLD AUTO: 4.76 K/UL (ref 1.82–7.42)
NEUTROPHILS NFR BLD: 59.9 % (ref 44–72)
NRBC # BLD AUTO: 0 K/UL
NRBC BLD-RTO: 0 /100 WBC (ref 0–0.2)
PLATELET # BLD AUTO: 288 K/UL (ref 164–446)
PMV BLD AUTO: 8.8 FL (ref 9–12.9)
POTASSIUM SERPL-SCNC: 4.3 MMOL/L (ref 3.6–5.5)
PROT SERPL-MCNC: 7.6 G/DL (ref 6–8.2)
RBC # BLD AUTO: 4.16 M/UL (ref 4.7–6.1)
SODIUM SERPL-SCNC: 138 MMOL/L (ref 135–145)
TRIGL SERPL-MCNC: 43 MG/DL (ref 0–149)
VIT B12 SERPL-MCNC: 1763 PG/ML (ref 211–911)
WBC # BLD AUTO: 7.9 K/UL (ref 4.8–10.8)

## 2024-07-23 PROCEDURE — 80061 LIPID PANEL: CPT

## 2024-07-23 PROCEDURE — 82746 ASSAY OF FOLIC ACID SERUM: CPT

## 2024-07-23 PROCEDURE — 80053 COMPREHEN METABOLIC PANEL: CPT

## 2024-07-23 PROCEDURE — 83036 HEMOGLOBIN GLYCOSYLATED A1C: CPT

## 2024-07-23 PROCEDURE — 36415 COLL VENOUS BLD VENIPUNCTURE: CPT

## 2024-07-23 PROCEDURE — 85025 COMPLETE CBC W/AUTO DIFF WBC: CPT

## 2024-07-23 PROCEDURE — 82607 VITAMIN B-12: CPT

## 2024-07-23 RX ORDER — ROSUVASTATIN CALCIUM 10 MG/1
10 TABLET, COATED ORAL EVERY EVENING
Qty: 100 TABLET | Refills: 3 | Status: SHIPPED | OUTPATIENT
Start: 2024-07-23

## 2024-08-06 ENCOUNTER — APPOINTMENT (OUTPATIENT)
Dept: MEDICAL GROUP | Facility: IMAGING CENTER | Age: 79
End: 2024-08-06
Payer: MEDICARE

## 2024-08-06 VITALS — RESPIRATION RATE: 14 BRPM | WEIGHT: 135 LBS | HEIGHT: 70 IN | BODY MASS INDEX: 19.33 KG/M2

## 2024-08-06 DIAGNOSIS — E11.65 TYPE 2 DIABETES MELLITUS WITH HYPERGLYCEMIA, WITHOUT LONG-TERM CURRENT USE OF INSULIN (HCC): ICD-10-CM

## 2024-08-06 RX ORDER — ATORVASTATIN CALCIUM 20 MG/1
20 TABLET, FILM COATED ORAL NIGHTLY
Qty: 90 TABLET | Refills: 3 | Status: SHIPPED | OUTPATIENT
Start: 2024-08-06

## 2024-08-06 RX ORDER — FLASH GLUCOSE SENSOR
KIT MISCELLANEOUS
Qty: 2 EACH | Refills: 5 | Status: SHIPPED | OUTPATIENT
Start: 2024-08-06 | End: 2024-08-26 | Stop reason: SDUPTHER

## 2024-08-06 ASSESSMENT — FIBROSIS 4 INDEX: FIB4 SCORE: 1.43

## 2024-08-06 NOTE — PATIENT INSTRUCTIONS
Diabetes recommendations:  -Follow an 1800 calorie ADA diet and aim to get about half of your calories from carbohydrates. For an 1800 calorie diet, that would be around 150-200 grams of carbs a day. Exercise as tolerated; ideally 150 minutes of cardiovascular exercise a week, or 20 minutes a day.   -Monitor blood sugars at home and keep a log book. Check your glucose fasting every morning, before dinner, and 2 hours after dinner (or as otherwise recommended at your appointment). Call if glucose <70 or >300. Please send weekly readings of your blood glucose through [employer].  Goal readings:  Before meals (preprandial):  mg/dL  After meals (postprandial): below 180 mg/dL  -Please check your feet frequently for any open wounds/ulcers, signs of infection, or changes in sensation/neuropathy.  -Ensure that you see your ophthalmologist for your annual eye exam to assess for diabetic retinopathy.

## 2024-08-06 NOTE — PROGRESS NOTES
Virtual Visit: Established Patient   This visit was conducted via  Teams  using secure and encrypted videoconferencing technology. The patient was in a private location in the state of [place].    The patient's identity was confirmed and verbal consent was obtained for this virtual visit.    Subjective:   CC:   Chief Complaint   Patient presents with    Referral Needed       Chirag Tone Damon Jr. is a 78 y.o. male presenting for evaluation and management of:    Type 2 diabetes mellitus with hyperglycemia, without long-term current use of insulin ([facility])  Newly diagnosed, A1c 6.5.  Cholesterol controlled on rosuvastatin.  Overdue for eye exam.  Denies any neuropathy in his feet, vision changes, increased thirst/hunger/urination.    ROS   Denies any recent fevers or chills. No nausea or vomiting. No chest pains or shortness of breath.     No Known Allergies    Current medicines (including changes today)  Current Outpatient Medications   Medication Sig Dispense Refill    metFORMIN (GLUCOPHAGE) 500 MG Tab Take 1 Tablet by mouth every day. 90 Tablet 0    Continuous Glucose Sensor (FREESTYLE ALESSANDRO 14 DAY SENSOR) Misc Apply to arm every 14 days 2 Each 5    atorvastatin (LIPITOR) 20 MG Tab Take 1 Tablet by mouth every evening. 90 Tablet 3    PEG 3350-KCl-NaBcb-NaCl-NaSulf (PEG-3350/ELECTROLYTES) 236 g Recon Soln       levothyroxine (SYNTHROID) 50 MCG Tab Take 1 Tablet by mouth every morning on an empty stomach. 100 Tablet 3    levothyroxine (SYNTHROID) 25 MCG Tab Take on Sundays with 50 mcg tablet. 12 Tablet 3    B Complex Vitamins (B COMPLEX-B12 PO) Take  by mouth every day.      VITAMIN A PO Take  by mouth every day.      Cholecalciferol (VITAMIN D3 PO) Take  by mouth every day.       No current facility-administered medications for this visit.       Patient Active Problem List    Diagnosis Date Noted    Atherosclerosis of aorta (HCC) 05/09/2024    Anemia 01/23/2024    Episodic lightheadedness 01/23/2024    Tinnitus of both  "ears 01/23/2024    Physician orders for life-sustaining treatment (POLST) form indicates patient wish for full code resuscitation status 01/23/2024    Dyslipidemia     Agatston CAC score 200-399. 2/2023 364 02/24/2023    Vitreous floaters of both eyes 12/16/2022    Cerebral atrophy (HCC) 12/16/2022    Dry eye 08/04/2022    Numerous moles 04/04/2022    10 year risk of MI or stroke 7.5% or greater 04/04/2022    Plantar wart 03/07/2022    Waldenstrom macroglobulinemia (HCC) 11/29/2021    Seborrheic keratoses 04/13/2021    DDD (degenerative disc disease), lumbar 05/09/2017    Lumbar spinal stenosis 05/09/2017    Hypothyroidism 05/09/2017    Type 2 diabetes mellitus with hyperglycemia, without long-term current use of insulin (HCC) 05/09/2017    DDD (degenerative disc disease), cervical 10/16/2016    Hernia, inguinal, left 04/08/2016       Family History   Problem Relation Age of Onset    Cancer Mother 50        breast    Cancer Paternal Grandmother 70        colon cancer       He  has a past medical history of Cataract, DDD (degenerative disc disease), cervical, DDD (degenerative disc disease), cervical, DDD (degenerative disc disease), lumbar, Dyslipidemia, Heart murmur, Hiatus hernia syndrome, Hyponatremia (04/13/2021), Lumbar spinal stenosis, Periodontitis (04/10/2024), and Vitamin D deficiency (02/02/2018).  He  has a past surgical history that includes other (2006); inguinal hernia repair robotic (Left, 4/8/2016); hernia repair; cataract extraction with iol (Bilateral); pr dx bone marrow aspirations (Left, 1/21/2022); and pr dx bone marrow biopsies (Left, 1/21/2022).         Objective:   Resp 14   Ht 1.778 m (5' 10\")   Wt 61.2 kg (135 lb)   BMI 19.37 kg/m²   RR 12    Physical Exam:  Constitutional: Alert, no distress, well-groomed.  Skin: No rashes in visible areas.  Eye: Round. Conjunctiva clear, lids normal. No icterus.   ENMT: Lips pink without lesions, good dentition, moist mucous membranes. Phonation " normal.  Neck: No masses, no thyromegaly. Moves freely without pain.  Respiratory: Unlabored respiratory effort, no cough or audible wheeze  Psych: Alert and oriented x3, normal affect and mood.     Assessment and Plan:   The following treatment plan was discussed:     1. Type 2 diabetes mellitus with hyperglycemia, without long-term current use of insulin (HCC)  Chronic, uncontrolled, newly diagnosed.  Start metformin 500 mg daily.  Side effects may include nausea and diarrhea.  Will switch rosuvastatin to atorvastatin.  Repeat lipids after next visit. Follow-up in 3 months for point-of-care A1c, urine micro, monofilament.  Diabetes recommendations:  -Follow an 1800 calorie ADA diet and aim to get about half of your calories from carbohydrates. For an 1800 calorie diet, that would be around 150-200 grams of carbs a day. Exercise as tolerated; ideally 150 minutes of cardiovascular exercise a week, or 20 minutes a day.   -Monitor blood sugars at home and keep a log book. Check your glucose fasting every morning, before dinner, and 2 hours after dinner (or as otherwise recommended at your appointment). Call if glucose <70 or >300. Please send weekly readings of your blood glucose through [employer].  -Please check your feet frequently for any open wounds/ulcers, signs of infection, or changes in sensation/neuropathy.  -Ensure that you see your ophthalmologist for your annual eye exam to assess for diabetic retinopathy.  - Referral for Retinal Screening Exam  - metFORMIN (GLUCOPHAGE) 500 MG Tab; Take 1 Tablet by mouth every day.  Dispense: 90 Tablet; Refill: 0  - Continuous Glucose Sensor (FREESTYLE ALESSANDRO 14 DAY SENSOR) Misc; Apply to arm every 14 days  Dispense: 2 Each; Refill: 5  - atorvastatin (LIPITOR) 20 MG Tab; Take 1 Tablet by mouth every evening.  Dispense: 90 Tablet; Refill: 3  - Referral to Diabetic Education    Follow-up: Return in about 12 weeks (around 10/29/2024) for Diabetes recheck. Point-of-care A1c,  urine micro, monofilament.         Etta Joe PA-C ([place])  Physician Assistant Certified  [facility]    Billing : secondary to the complexity of this patient's illnesses and their interactions.  All problems listed were discussed during the office visit, medications were evaluated and complexities were discussed as well as plan for the future.     Please note that this dictation was created using voice recognition software. I have made every reasonable attempt to correct obvious errors, but I expect that there are errors of grammar and possibly content that I did not discover before finalizing the note.

## 2024-08-07 NOTE — ASSESSMENT & PLAN NOTE
Newly diagnosed, A1c 6.5.  Cholesterol controlled on rosuvastatin.  Overdue for eye exam.  Denies any neuropathy in his feet, vision changes, increased thirst/hunger/urination.

## 2024-08-08 ENCOUNTER — HOSPITAL ENCOUNTER (OUTPATIENT)
Dept: HEMATOLOGY ONCOLOGY | Facility: MEDICAL CENTER | Age: 79
End: 2024-08-08
Attending: NURSE PRACTITIONER
Payer: MEDICARE

## 2024-08-08 VITALS
OXYGEN SATURATION: 98 % | HEIGHT: 70 IN | WEIGHT: 140.2 LBS | BODY MASS INDEX: 20.07 KG/M2 | TEMPERATURE: 98.3 F | SYSTOLIC BLOOD PRESSURE: 116 MMHG | DIASTOLIC BLOOD PRESSURE: 60 MMHG | HEART RATE: 63 BPM

## 2024-08-08 DIAGNOSIS — C88.0 WALDENSTROM MACROGLOBULINEMIA (HCC): ICD-10-CM

## 2024-08-08 PROCEDURE — 99212 OFFICE O/P EST SF 10 MIN: CPT | Performed by: NURSE PRACTITIONER

## 2024-08-08 PROCEDURE — 99214 OFFICE O/P EST MOD 30 MIN: CPT | Performed by: NURSE PRACTITIONER

## 2024-08-08 ASSESSMENT — ENCOUNTER SYMPTOMS
VOMITING: 0
FEVER: 0
BLOOD IN STOOL: 0
CHILLS: 0
DIZZINESS: 1
SHORTNESS OF BREATH: 0
DIAPHORESIS: 0
PALPITATIONS: 0
COUGH: 0
HEADACHES: 0
MYALGIAS: 0
NAUSEA: 0
WHEEZING: 0
WEIGHT LOSS: 0
DIARRHEA: 0
CONSTIPATION: 0
TINGLING: 1
INSOMNIA: 0

## 2024-08-08 ASSESSMENT — FIBROSIS 4 INDEX: FIB4 SCORE: 1.43

## 2024-08-08 NOTE — PROGRESS NOTES
Subjective     Chirag Damon Jr. is a 78 y.o. male who presents with Follow-Up (Pineda/ SCP/ Waldenstrom macroglobulinemia / 6 mo FV)            HPI  Mr. 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 He is unaccompanied for today's visit.     Patient referred to [facility] in 12/2021 per PCP for further evaluation of abnormal labs, macrocytosis with anemia. UPEP and SPEP were completed with serum immunoglobulins showing elevated IgM, low IgG, normal IgA, elevated kappa light chains and kappa/lambda ratio. Patient underwent bone marrow biopsy 1/21/2022 with findings consistent with lymphoplasmacytic lymphoma (Waldenström's macroglobulinemia): MYD88 positive, normal viscosity, CRAB criteria within acceptable limits. He proceeded with surveillance.     Patient continues engaging in his usual activity. Patient continues without B symptoms and CRAB remains within acceptable limits.  Patient is essentially asymptomatic and at his baseline.       Review of Systems   Constitutional:  Negative for chills, diaphoresis, fever, malaise/fatigue and weight loss (stable).   Respiratory:  Negative for cough, shortness of breath and wheezing.    Cardiovascular:  Negative for chest pain, palpitations and leg swelling.   Gastrointestinal:  Negative for blood in stool, constipation (Last BM this am), diarrhea, melena, nausea and vomiting.   Genitourinary:  Negative for dysuria and hematuria.   Musculoskeletal:  Positive for joint pain (somewhat worse with positioning - riding a motorcycle or mountain bike). Negative for myalgias.   Neurological:  Positive for dizziness (when up too quick) and tingling (hand and feet; new DM dx). Negative for headaches.   Psychiatric/Behavioral:  The patient does not have insomnia.      No Known Allergies      Current Outpatient Medications on File Prior to Encounter   Medication Sig Dispense Refill    Continuous Glucose Sensor (FREESTYLE ALESSANDRO  "14 DAY SENSOR) Misc Apply to arm every 14 days 2 Each 5    atorvastatin (LIPITOR) 20 MG Tab Take 1 Tablet by mouth every evening. 90 Tablet 3    levothyroxine (SYNTHROID) 50 MCG Tab Take 1 Tablet by mouth every morning on an empty stomach. 100 Tablet 3    levothyroxine (SYNTHROID) 25 MCG Tab Take on Sundays with 50 mcg tablet. 12 Tablet 3    B Complex Vitamins (B COMPLEX-B12 PO) Take  by mouth every day.      VITAMIN A PO Take  by mouth every day.      Cholecalciferol (VITAMIN D3 PO) Take  by mouth every day.      metFORMIN (GLUCOPHAGE) 500 MG Tab Take 1 Tablet by mouth every day. (Patient not taking: Reported on 8/8/2024) 90 Tablet 0    PEG 3350-KCl-NaBcb-NaCl-NaSulf (PEG-3350/ELECTROLYTES) 236 g Recon Soln  (Patient not taking: Reported on 8/8/2024)       No current facility-administered medications on file prior to encounter.              Objective     /60 (BP Location: Right arm, Patient Position: Sitting, BP Cuff Size: Adult)   Pulse 63   Temp 36.8 °C (98.3 °F) (Temporal)   Ht 1.778 m (5' 10\")   Wt 63.6 kg (140 lb 3.2 oz)   SpO2 98%   BMI 20.12 kg/m²      Physical Exam  Vitals reviewed.   Constitutional:       General: He is not in acute distress.     Appearance: He is well-developed. He is not diaphoretic.   HENT:      Head: Normocephalic and atraumatic.   Eyes:      General: No scleral icterus.        Right eye: No discharge.         Left eye: No discharge.   Cardiovascular:      Rate and Rhythm: Normal rate and regular rhythm.      Heart sounds: Murmur heard.      No friction rub. No gallop.   Pulmonary:      Effort: Pulmonary effort is normal. No respiratory distress.      Breath sounds: Normal breath sounds. No wheezing.   Abdominal:      General: There is no distension.      Palpations: Abdomen is soft.      Tenderness: There is no abdominal tenderness.   Musculoskeletal:         General: Normal range of motion.      Cervical back: Normal range of motion.   Skin:     General: Skin is warm and " dry.      Coloration: Skin is not pale.      Findings: No erythema or rash.   Neurological:      Mental Status: He is alert and oriented to person, place, and time.   Psychiatric:         Behavior: Behavior normal.        Latest Reference Range & Units 07/23/24 07:44   WBC 4.8 - 10.8 K/uL 7.9   RBC 4.70 - 6.10 M/uL 4.16 (L)   Hemoglobin 14.0 - 18.0 g/dL 14.1   Hematocrit 42.0 - 52.0 % 40.7 (L)   MCV 81.4 - 97.8 fL 97.8   MCH 27.0 - 33.0 pg 33.9 (H)   MCHC 32.3 - 36.5 g/dL 34.6   RDW 35.9 - 50.0 fL 46.2   Platelet Count 164 - 446 K/uL 288   MPV 9.0 - 12.9 fL 8.8 (L)   Neutrophils-Polys 44.00 - 72.00 % 59.90   Neutrophils (Absolute) 1.82 - 7.42 K/uL 4.76   Lymphocytes 22.00 - 41.00 % 29.10   Lymphs (Absolute) 1.00 - 4.80 K/uL 2.31   Monocytes 0.00 - 13.40 % 9.30   Monos (Absolute) 0.00 - 0.85 K/uL 0.74   Eosinophils 0.00 - 6.90 % 0.90   Eos (Absolute) 0.00 - 0.51 K/uL 0.07   Basophils 0.00 - 1.80 % 0.50   Baso (Absolute) 0.00 - 0.12 K/uL 0.04   Immature Granulocytes 0.00 - 0.90 % 0.30   Immature Granulocytes (abs) 0.00 - 0.11 K/uL 0.02   Nucleated RBC 0.00 - 0.20 /100 WBC 0.00   NRBC (Absolute) K/uL 0.00   Sodium 135 - 145 mmol/L 138   Potassium 3.6 - 5.5 mmol/L 4.3   Chloride 96 - 112 mmol/L 101   Co2 20 - 33 mmol/L 21   Anion Gap 7.0 - 16.0  16.0   Glucose 65 - 99 mg/dL 132 (H)   Bun 8 - 22 mg/dL 15   Creatinine 0.50 - 1.40 mg/dL 0.92   GFR (CKD-EPI) >60 mL/min/1.73 m 2 85   Calcium 8.5 - 10.5 mg/dL 9.9   Correct Calcium 8.5 - 10.5 mg/dL 9.5   AST(SGOT) 12 - 45 U/L 22   ALT(SGPT) 2 - 50 U/L 17   Alkaline Phosphatase 30 - 99 U/L 54   Total Bilirubin 0.1 - 1.5 mg/dL 0.7   Albumin 3.2 - 4.9 g/dL 4.5   Total Protein 6.0 - 8.2 g/dL 7.6   Globulin 1.9 - 3.5 g/dL 3.1   A-G Ratio g/dL 1.5   (L): Data is abnormally low  (H): Data is abnormally high         Assessment & Plan     1. Waldenstrom macroglobulinemia (HCC)  CBC WITH DIFFERENTIAL    Monoclonal Protein and FLC, Serum    Comp Metabolic Panel    Monoclonal Protein and  FLC, Serum    CANCELED: Monoclonal Protein and FLC, Serum          1.  Waldenström macroglobulinemia: Diagnosed 1/21/22; no B symptoms, stable CRAB criteria - no indication for treatment to date.     CBC, CMP have been evaluated and found to be within acceptable limits; myeloma labs were not completed for this visit. Standing orders are updated and he will repeat labs and return for reevaluation in 6 months, sooner as needed.      The patient verbalized agreement and understanding of current plan. All questions and concerns were addressed at time of visit.    Please note that this dictation was created using voice recognition software. I have made every reasonable attempt to correct obvious errors, but I expect that there are errors of grammar and possibly content that I did not discover before finalizing the note.

## 2024-08-26 ENCOUNTER — OFFICE VISIT (OUTPATIENT)
Dept: MEDICAL GROUP | Facility: IMAGING CENTER | Age: 79
End: 2024-08-26
Payer: MEDICARE

## 2024-08-26 VITALS
TEMPERATURE: 97.8 F | DIASTOLIC BLOOD PRESSURE: 68 MMHG | RESPIRATION RATE: 16 BRPM | OXYGEN SATURATION: 97 % | WEIGHT: 136 LBS | HEIGHT: 70 IN | HEART RATE: 67 BPM | SYSTOLIC BLOOD PRESSURE: 114 MMHG | BODY MASS INDEX: 19.47 KG/M2

## 2024-08-26 DIAGNOSIS — M25.551 PAIN OF RIGHT HIP: ICD-10-CM

## 2024-08-26 DIAGNOSIS — Z02.89 ENCOUNTER FOR COMPLETION OF FORM WITH PATIENT: ICD-10-CM

## 2024-08-26 DIAGNOSIS — M48.061 SPINAL STENOSIS OF LUMBAR REGION, UNSPECIFIED WHETHER NEUROGENIC CLAUDICATION PRESENT: Chronic | ICD-10-CM

## 2024-08-26 DIAGNOSIS — E11.65 TYPE 2 DIABETES MELLITUS WITH HYPERGLYCEMIA, WITHOUT LONG-TERM CURRENT USE OF INSULIN (HCC): ICD-10-CM

## 2024-08-26 PROCEDURE — 7101 PR PHYSICAL: Performed by: PHYSICIAN ASSISTANT

## 2024-08-26 PROCEDURE — 99214 OFFICE O/P EST MOD 30 MIN: CPT | Performed by: PHYSICIAN ASSISTANT

## 2024-08-26 PROCEDURE — G2211 COMPLEX E/M VISIT ADD ON: HCPCS | Performed by: PHYSICIAN ASSISTANT

## 2024-08-26 PROCEDURE — 1125F AMNT PAIN NOTED PAIN PRSNT: CPT | Performed by: PHYSICIAN ASSISTANT

## 2024-08-26 RX ORDER — FLASH GLUCOSE SENSOR
KIT MISCELLANEOUS
Qty: 2 EACH | Refills: 5 | Status: SHIPPED | OUTPATIENT
Start: 2024-08-26

## 2024-08-26 ASSESSMENT — PAIN SCALES - GENERAL: PAINLEVEL: 4=SLIGHT-MODERATE PAIN

## 2024-08-26 ASSESSMENT — FIBROSIS 4 INDEX: FIB4 SCORE: 1.43
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
## 2024-08-26 NOTE — ASSESSMENT & PLAN NOTE
Tolerating metformin.  Needs refill of freestyle viviana monitors.  States that the other 2 monitors broke.  Has not scheduled an appointment yet with the diabetic educator.

## 2024-08-26 NOTE — PATIENT INSTRUCTIONS
It was a pleasure meeting with you today at [facility]!    Your medical history/records and medications were reviewed today.     UPDATE on MyChart Results: If you have blood work, and/or imaging studies, or any other test or procedure completed, you will have access to results as soon as they become available in MyChart. Recently, these results will be available for review at the same time that your provider is able to see results!    This will likely mean you will see a result before your provider has had a chance to review and discuss with you.  Some results or care notes may be hard to understand and may be serious in nature.    We look at every result and your provider will contact you to explain what they mean and discuss appropriate next steps. Please allow for at least 72 business hours for chart and result review.     We prefer that you wait for your care team to contact you with your results.  Often, your provider will discuss your results with you at your next appointment. We look forward to continuing to partner with you in your care.    Please review my practice information below:    If you have any prescription refill requests, please send them via [facility] or discuss with your provider at the start of your office visits. Please allow 3-5 business days for lab and testing review and you will be contacted via [facility] [facility] with those results, or if advised to make a follow up appointment regarding those results, then please do so.     Once resulted, your lab/test/imaging results will show up automatically in your MyChart. Please wait for my interpretation and recommendations prior to viewing your results to avoid any unnecessary confusion or misinterpretation. I will address all of the lab values that I interpret as abnormal and message you accordingly on your MyChart. I will always send you a message about your results even if they are normal. If you do not hear back from me within 5-7 business  days after completing your tests, then please send me a message on [employer] so I can obtain your results (especially if you went to an outside lab or imaging center - [facility], [facility], etc).     If you have any additional questions or concerns beyond my interpretation of your results, please make an appointment with me to discuss in further detail.    Please only use the [employer] messaging system for questions regarding your most recent appointment or if advised to use otherwise (glucose or blood pressure reporting).     If you have any new problems or concerns, you must make an appointment to discuss. This includes any referral requests, lab requests (unless advised to notify me for pre-appt labs), medication side effects, or request for medication adjustments.     Please arrive 15 minutes prior to your appointment time to complete your check-in and intake with the medical assistant.      Thank you,    Etta Joe PA-C ([place])  Physician Assistant Certified  [facility]    -----------------------------------------------------------------    Attn: Patients of [facility]:    In an effort to continue to provide excellent and efficient care to our patients, it is vital that we continue to use our resources appropriately. With that, this is a reminder that [employer] is used for prescription refill requests, test results, virtual visits, and chart review only.     Any new questions, concerns/conditions, lab/imaging requests, medication adjustments, new prescriptions, or referral requests do require an appointment (virtually or in person), unless discussed otherwise at your most recent appointment.     Thank you for your understanding,    [facility]    [facility] - Diabetes consultation  [address] [address]  Phone: [phone]

## 2024-08-26 NOTE — ASSESSMENT & PLAN NOTE
Patient with chronic lower back pain.  He has to stop to rest before 200 feet. Patient would like renewal of [facility] handicap placard.  Never submitted the previous order from January.
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
## 2024-08-27 ENCOUNTER — HOSPITAL ENCOUNTER (OUTPATIENT)
Dept: RADIOLOGY | Facility: MEDICAL CENTER | Age: 79
End: 2024-08-27
Attending: PHYSICIAN ASSISTANT
Payer: MEDICARE

## 2024-08-27 ENCOUNTER — TELEPHONE (OUTPATIENT)
Dept: HEALTH INFORMATION MANAGEMENT | Facility: OTHER | Age: 79
End: 2024-08-27
Payer: MEDICARE

## 2024-08-27 DIAGNOSIS — M25.551 PAIN OF RIGHT HIP: ICD-10-CM

## 2024-08-27 PROCEDURE — 73552 X-RAY EXAM OF FEMUR 2/>: CPT | Mod: RT

## 2024-08-27 PROCEDURE — 73502 X-RAY EXAM HIP UNI 2-3 VIEWS: CPT | Mod: RT
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
## 2024-08-28 DIAGNOSIS — M48.061 SPINAL STENOSIS OF LUMBAR REGION, UNSPECIFIED WHETHER NEUROGENIC CLAUDICATION PRESENT: ICD-10-CM

## 2024-09-06 ENCOUNTER — OFFICE VISIT (OUTPATIENT)
Dept: MEDICAL GROUP | Facility: IMAGING CENTER | Age: 79
End: 2024-09-06
Payer: MEDICARE

## 2024-09-06 ENCOUNTER — HOSPITAL ENCOUNTER (OUTPATIENT)
Facility: MEDICAL CENTER | Age: 79
End: 2024-09-06
Attending: PHYSICIAN ASSISTANT
Payer: MEDICARE

## 2024-09-06 VITALS
WEIGHT: 135.6 LBS | DIASTOLIC BLOOD PRESSURE: 60 MMHG | SYSTOLIC BLOOD PRESSURE: 102 MMHG | RESPIRATION RATE: 16 BRPM | BODY MASS INDEX: 19.41 KG/M2 | HEART RATE: 58 BPM | TEMPERATURE: 97.6 F | HEIGHT: 70 IN | OXYGEN SATURATION: 97 %

## 2024-09-06 DIAGNOSIS — E11.65 TYPE 2 DIABETES MELLITUS WITH HYPERGLYCEMIA, WITHOUT LONG-TERM CURRENT USE OF INSULIN (HCC): ICD-10-CM

## 2024-09-06 LAB
CREAT UR-MCNC: 104.25 MG/DL
MICROALBUMIN UR-MCNC: <1.2 MG/DL
MICROALBUMIN/CREAT UR: NORMAL MG/G (ref 0–30)

## 2024-09-06 PROCEDURE — G2211 COMPLEX E/M VISIT ADD ON: HCPCS | Performed by: PHYSICIAN ASSISTANT

## 2024-09-06 PROCEDURE — 99213 OFFICE O/P EST LOW 20 MIN: CPT | Performed by: PHYSICIAN ASSISTANT

## 2024-09-06 PROCEDURE — 82570 ASSAY OF URINE CREATININE: CPT

## 2024-09-06 PROCEDURE — 82043 UR ALBUMIN QUANTITATIVE: CPT

## 2024-09-06 ASSESSMENT — FIBROSIS 4 INDEX: FIB4 SCORE: 1.43

## 2024-09-06 NOTE — ASSESSMENT & PLAN NOTE
Chronic, tolerating metformin.  He is made significant changes to his diet.  No longer drinking alcohol.  Avoiding baked goods.  Has not been able to figure out the freestyle viviana.  He wants to hold off on using any glucose monitoring at this time.  He is up-to-date on his eye exam.  No neuropathy.

## 2024-09-06 NOTE — PROGRESS NOTES
CHIEF COMPLAINT ON ADMISSION  No chief complaint on file.      CODE STATUS  Full Code    HPI & HOSPITAL COURSE  63 y.o. male with past medical history of morbid obesity , sleep apnea , chronic atrial fibrillation , DVT , chronic anticoagulation , hypertension , mood disorder , anxiolytic dependence , narcotic dependent admitted 4/27/2018 with Abdominal Pain, NV. Patient underwent a lap nilson on 4/2/2018, ERCP 4/3 w/ stent. He felt well at discharge but returns with gradual worsening of the above symptoms. He presented to [facility] where he reportedly had a fever of 103.9. CT demonstrated fluid around the pancreatic head and gallbladder fossa-he was subsequently transferred here.    He underwent ERCP and stent placement on 5/2. His pain improved and he was able to tolerate diet. He was OK'd by GI to resume Xarelto. Patient did not have portable O2 for trip home so this was coordinated before his DC. CPAP was ordered for his stay but he did not tolerate the mask provided here.    He had no further fever since arrival and blood cultures were negatives, antibiotics were discontinued.    The patient met 2-midnight criteria for an inpatient stay at the time of discharge.    Therefore, he is discharged in good and stable condition with close outpatient follow-up.    SPECIFIC OUTPATIENT FOLLOW-UP  GI consultants in 8 weeks   Primary care in [facility]    DISCHARGE PROBLEM LIST  Principal Problem:    Right upper quadrant abdominal pain POA: Yes  Active Problems:    History of DVT (deep vein thrombosis) POA: Yes    Circulating anticoagulants (HCC) POA: Yes    Chronic atrial fibrillation (HCC) POA: Yes    COPD (chronic obstructive pulmonary disease) (HCC) POA: Yes    SANTOSH on CPAP POA: Yes    Sepsis (HCC) POA: Yes    Bile leak POA: Yes    BMI 45.0-49.9, adult (HCC) POA: Yes    Hepatitis C infection POA: Yes    Chronic respiratory failure with hypoxia (HCC) POA: Yes    Opiate dependence (HCC) POA: Yes    Anxiolytic dependence (HCC)  Subjective:     CC:   Chief Complaint   Patient presents with    Follow-Up     Urine test       HPI:   Chirag presents today to discuss:    Type 2 diabetes mellitus with hyperglycemia, without long-term current use of insulin (HCC)  Chronic, tolerating metformin.  He is made significant changes to his diet.  No longer drinking alcohol.  Avoiding baked goods.  Has not been able to figure out the freestyle viviana.  He wants to hold off on using any glucose monitoring at this time.  He is up-to-date on his eye exam.  No neuropathy.      Past Medical History:   Diagnosis Date    Cataract     alonso iol    DDD (degenerative disc disease), cervical     DDD (degenerative disc disease), cervical     DDD (degenerative disc disease), lumbar     Dyslipidemia     Heart murmur     Hiatus hernia syndrome     Hyponatremia 04/13/2021    Lumbar spinal stenosis     Periodontitis 04/10/2024    Vitamin D deficiency 02/02/2018     Family History   Problem Relation Age of Onset    Cancer Mother 50        breast    Cancer Paternal Grandmother 70        colon cancer     Past Surgical History:   Procedure Laterality Date    CT DX BONE MARROW ASPIRATIONS Left 1/21/2022    Procedure: ASPIRATION, BONE MARROW - DR SANCHEZ;  Surgeon: Tyrel Mcintyre M.D.;  Location: SURGERY SAME DAY [facility];  Service: Orthopedics    CT DX BONE MARROW BIOPSIES Left 1/21/2022    Procedure: BIOPSY, BONE MARROW, USING NEEDLE OR TROCAR;  Surgeon: Tyrel Mcintyre M.D.;  Location: SURGERY SAME DAY [facility];  Service: Orthopedics    INGUINAL HERNIA REPAIR ROBOTIC Left 4/8/2016    Procedure: INGUINAL HERNIA REPAIR ROBOTIC WITH MESH;  Surgeon: Dustin Maria M.D.;  Location: SURGERY SAME DAY [facility];  Service:     OTHER  2006    alonso iol    CATARACT EXTRACTION WITH IOL Bilateral     HERNIA REPAIR      bilat     Social History     Tobacco Use    Smoking status: Never    Smokeless tobacco: Never   Vaping Use    Vaping status: Some Days   Substance Use Topics    Alcohol use: Yes  "    Alcohol/week: 0.6 oz     Types: 1 Glasses of wine per week     Comment: 4-5/week; beer once in a while. 1/18/22: 1 drink per day.    Drug use: No     Social History     Social History Narrative    Not on file     Current Outpatient Medications Ordered in Epic   Medication Sig Dispense Refill    Continuous Glucose Sensor (FREESTYLE ALESSANDRO 14 DAY SENSOR) Misc Apply to arm every 14 days 2 Each 5    metFORMIN (GLUCOPHAGE) 500 MG Tab Take 1 Tablet by mouth every day. 90 Tablet 0    atorvastatin (LIPITOR) 20 MG Tab Take 1 Tablet by mouth every evening. 90 Tablet 3    PEG 3350-KCl-NaBcb-NaCl-NaSulf (PEG-3350/ELECTROLYTES) 236 g Recon Soln       levothyroxine (SYNTHROID) 50 MCG Tab Take 1 Tablet by mouth every morning on an empty stomach. 100 Tablet 3    levothyroxine (SYNTHROID) 25 MCG Tab Take on Sundays with 50 mcg tablet. 12 Tablet 3    B Complex Vitamins (B COMPLEX-B12 PO) Take  by mouth every day.      VITAMIN A PO Take  by mouth every day.      Cholecalciferol (VITAMIN D3 PO) Take  by mouth every day.       No current [facility]-ordered facility-administered medications on file.     Patient has no known allergies.    PMH/PSH/FH/Social history reviewed.  Vaccinations discussed.  Previous records and labs reviewed. Discussed age appropriate anticipatory guidance.    ROS: see hpi  Gen: no fevers/chills  Pulm: no sob, no cough  CV: no chest pain, no palpitations, no edema  GI: no nausea/vomiting, no diarrhea  Skin: no rash    Objective:   Exam:  /60 (BP Location: Right arm, Patient Position: Sitting, BP Cuff Size: Adult)   Pulse (!) 58   Temp 36.4 °C (97.6 °F) (Temporal)   Resp 16   Ht 1.778 m (5' 10\")   Wt 61.5 kg (135 lb 9.6 oz)   SpO2 97%   BMI 19.46 kg/m²    Body mass index is 19.46 kg/m².    Gen: Alert and oriented, No apparent distress.  HEENT: Head atraumatic, normocephalic. Pupils equal and round.  Neck: Neck is supple without lymphadenopathy.   Lungs: Normal effort, CTA bilaterally, no wheezes, " "POA: Yes    Mood disorder (HCC) POA: Yes  Resolved Problems:    * No resolved hospital problems. *      FOLLOW UP  Future Appointments  Date Time Provider Department Center   5/15/2018 9:30 AM Tam Ochoa M.D. [facility] JERO Evans M.D.  [address] [address]  [phone]    Schedule an appointment as soon as possible for a visit in 8 weeks  For follow up, may follow up with either MD Ariel Aguirre M.D.  [address] [facility] [address]  [phone]    Schedule an appointment as soon as possible for a visit  For follow up, may follow up with either MD      MEDICATIONS ON DISCHARGE   Quoc Henry   Home Medication Instructions GAVINO:[id number]    Printed on:05/04/18 3991   Medication Information                      albuterol 108 (90 Base) MCG/ACT Aero Soln inhalation aerosol  Inhale 2 Puffs by mouth every 6 hours as needed for Shortness of Breath.             atenolol (TENORMIN) 25 MG Tab  Take 25 mg by mouth 2 times a day.             clonazePAM (KLONOPIN) 1 MG Tab  Take 1 mg by mouth 3 times a day.             digoxin (LANOXIN) 125 MCG Tab  Take 125 mcg by mouth every day.             fluticasone-salmeterol (ADVAIR) 250-50 MCG/DOSE AEROSOL POWDER, BREATH ACTIVATED  Inhale 1 Puff by mouth every day.             furosemide (LASIX) 40 MG Tab  Take 40 mg by mouth as needed.             gemfibrozil (LOPID) 600 MG Tab  Take 600 mg by mouth 2 times a day.             HYDROcodone/acetaminophen (NORCO)  MG Tab  Take 1 Tab by mouth 4 times a day. Scheduled.             lisinopril (PRINIVIL, ZESTRIL) 40 MG tablet  Take 40 mg by mouth 1 time daily as needed. If BP is high.             QUEtiapine (SEROQUEL) 100 MG Tab  Take 100 mg by mouth every bedtime.             rivaroxaban (XARELTO) 20 MG Tab tablet  Take 20 mg by mouth every day.             simvastatin (ZOCOR) 20 MG Tab  Take 20 mg by mouth 1 time daily as needed. \"If I eat a lot of meat.\"             spironolactone (ALDACTONE) 25 MG " Tab  Take 25 mg by mouth every day.             sucralfate (CARAFATE) 1 GM Tab  Take 1 Tab by mouth every 6 hours.             theophylline SR (THEODUR) 300 MG TABLET SR 12 HR  Take 300 mg by mouth 2 times a day.             tiotropium (SPIRIVA) 18 MCG Cap  Inhale 18 mcg by mouth every day.                 DIET  Orders Placed This Encounter   Procedures   • DIET ORDER     Standing Status:   Standing     Number of Occurrences:   1     Order Specific Question:   Diet:     Answer:   Regular [1]   • DISCONTINUE DIET TRAY     Standing Status:   Standing     Number of Occurrences:   1       ACTIVITY  As before    CONSULTATIONS  [facility]    PROCEDURES  Endoscopic Retrograde Cholangiopancreatography     Date of Procedure:  5/2/2018  Attending Physician:  Ariel Aguirre MD   Indications: Bile leak, abnormal HIDA scan, nausea/vomiting        Instrument: Olympus Flexible Sideviewing Endoscope  Sedation:  Tabatha Ricardo M.D, General Anesthesia     Pre-Anesthesia Assessment:  Prior to the procedure, a History and Physical was performed, and patient medications and allergies were reviewed. The patient’s tolerance of previous anesthesia was also reviewed. The risks and benefits of the procedure and the sedation options and risks were discussed with the patient including but not limited to infection, bleeding, aspiration, perforation, adverse medication reaction, missed diagnosis, missed lesions, and pancreatitis. The patient verbalized understanding. All questions were answered, and informed consent was obtained        After I obtained informed consent from the patient, the patient was placed in the prone/swimmer position. Appropriate time-out protocol was followed: the correct patient, the correct procedure, and the correct equipment in the room were confirmed. Throughout the procedure, the patient’s blood pressure, pulse, and oxygen saturations were monitored continuously. The Olymus flexible forward viewing endoscope  rhonchi, or rales  CV: Regular rate and rhythm. No murmurs, rubs, or gallops.  Ext: No clubbing, cyanosis, edema.    Monofilament testing with a 10 gram force: sensation intact: intact bilaterally  Visual Inspection: Feet without maceration, ulcers, fissures.  Pedal pulses: intact bilaterally    Assessment & Plan:     78 y.o. male with the following -     1. Type 2 diabetes mellitus with hyperglycemia, without long-term current use of insulin ([facility])  Chronic, on metformin 500 mg daily.  Continue lifestyle changes.  Will check point-of-care A1c next visit.  - Microalbumin Creat Ratio Urine (Clinic Collect); Future  - Diabetic Monofilament LE Exam    Return for As scheduled.    Etta Joe PA-C ([facility]aker)  Physician Assistant Certified  [facility]    Billing : secondary to the complexity of this patient's illnesses and their interactions.  All problems listed were discussed during the office visit, medications were evaluated and complexities were discussed as well as plan for the future.     Please note that this dictation was created using voice recognition software. I have made every reasonable attempt to correct obvious errors, but I expect that there are errors of grammar and possibly content that I did not discover before finalizing the note.   and  sideviewing duodenoscope were inserted through the oropharynx, esophagus intubated, then advanced to the gastrointestinal tract to the major papilla. The duct(s) were cannulated and contrast was injected I personally interpreted the ductal images.  Findings and interventions were performed and documented below. Air was then withdrawn and the duodenoscope was removed. The patient tolerated the procedure well. There were no immediate postoperative complications     Findings:    EGD:  Esophagus normal GE junction at approximately 45 cm from incisor. Biopsy esophagus  Stomach bile noted in stomach mild gastric erythema. Biopsy taken  Duodenum 2nd portion and 1st portion normal. Stent appears visible.     ERCP:   film showed right upper quadrant stent consistent with known in situ biliary stent. Stent appears somewhat occluded with debris. Stent was removed intact through-the-scope snare. Cannulation utilizing 0.035 wire and a 9-12 mm balloon preferentially entered the bile duct. Throughout the entire procedure and carotid duct was never cannulated with injected. Balloon sweep demonstrated biliary debris and sludge. The biliary orifice accommodates 12mm balloon pullthrough.  Stepwise occlusion cholangiogram demonstrated no filling defect in the biliary system. The cystic duct appears to fill without glory extravasation would leak noted with contrast. Due to abnormal HIDA scan concerning for ongoing leak as well as nausea secondary to leak a fully covered metal biliary [employer] Wallflex 10mm x 8cm stent was placed below the bifurcation with bridging the area of cystic duct take off, and exits the biliary orifice with at least 4 interces from the orifice. Biliary film under the diaphragm without free air     Impressions:   1. No gross extravasation of contrast noted from cystic duct on occlusion cholangiogram. Due to abnormal HIDA placement of a fully covered 10 mm x 8 cm metal bile duct stent  2.  Removal a semi-occluded in-situ plastic biliary stent  3. Gastric erythema, with bile noted, biopsied.  4. Biopsy of normal appearing esophagus; and duodenum. No evidence of gastric outlet obstruction.     Recommendations:   1. Monitor for post procedure complications including pancreatitis  2. Advance diet as tolerated in 4 hours if no pain  3. Await pathology  4. Consideration to start Carafate for bile acid gastritis  5. Stent removal in 4-6 months with ERCP.       LABORATORY  Lab Results   Component Value Date/Time    SODIUM 140 05/04/2018 01:48 AM    POTASSIUM 3.6 05/04/2018 01:48 AM    CHLORIDE 108 05/04/2018 01:48 AM    CO2 23 05/04/2018 01:48 AM    GLUCOSE 112 (H) 05/04/2018 01:48 AM    BUN 15 05/04/2018 01:48 AM    CREATININE 0.70 05/04/2018 01:48 AM        Lab Results   Component Value Date/Time    WBC 5.6 05/04/2018 01:48 AM    HEMOGLOBIN 10.5 (L) 05/04/2018 01:48 AM    HEMATOCRIT 33.8 (L) 05/04/2018 01:48 AM    PLATELETCT 225 05/04/2018 01:48 AM        Total time of the discharge process exceeds 45 minutes

## 2024-09-06 NOTE — PATIENT INSTRUCTIONS
It was a pleasure meeting with you today at [facility]!    Your medical history/records and medications were reviewed today.     UPDATE on MyChart Results: If you have blood work, and/or imaging studies, or any other test or procedure completed, you will have access to results as soon as they become available in MyChart. Recently, these results will be available for review at the same time that your provider is able to see results!    This will likely mean you will see a result before your provider has had a chance to review and discuss with you.  Some results or care notes may be hard to understand and may be serious in nature.    We look at every result and your provider will contact you to explain what they mean and discuss appropriate next steps. Please allow for at least 72 business hours for chart and result review.     We prefer that you wait for your care team to contact you with your results.  Often, your provider will discuss your results with you at your next appointment. We look forward to continuing to partner with you in your care.    Please review my practice information below:    If you have any prescription refill requests, please send them via Hooja or discuss with your provider at the start of your office visits. Please allow 3-5 business days for lab and testing review and you will be contacted via Hooja with those results, or if advised to make a follow up appointment regarding those results, then please do so.     Once resulted, your lab/test/imaging results will show up automatically in your MyChart. Please wait for my interpretation and recommendations prior to viewing your results to avoid any unnecessary confusion or misinterpretation. I will address all of the lab values that I interpret as abnormal and message you accordingly on your MyChart. I will always send you a message about your results even if they are normal. If you do not hear back from me within 5-7 business  days after completing your tests, then please send me a message on Manifest so I can obtain your results (especially if you went to an outside lab or imaging center - [facility], [facility], etc).     If you have any additional questions or concerns beyond my interpretation of your results, please make an appointment with me to discuss in further detail.    Please only use the Manifest messaging system for questions regarding your most recent appointment or if advised to use otherwise (glucose or blood pressure reporting).     If you have any new problems or concerns, you must make an appointment to discuss. This includes any referral requests, lab requests (unless advised to notify me for pre-appt labs), medication side effects, or request for medication adjustments.     Please arrive 15 minutes prior to your appointment time to complete your check-in and intake with the medical assistant.      Thank you,    Etta Joe PA-C ([place])  Physician Assistant Certified  [facility]    -----------------------------------------------------------------    Attn: Patients of [facility]:    In an effort to continue to provide excellent and efficient care to our patients, it is vital that we continue to use our resources appropriately. With that, this is a reminder that Manifest is used for prescription refill requests, test results, virtual visits, and chart review only.     Any new questions, concerns/conditions, lab/imaging requests, medication adjustments, new prescriptions, or referral requests do require an appointment (virtually or in person), unless discussed otherwise at your most recent appointment.     Thank you for your understanding,    [facility]

## 2024-10-07 DIAGNOSIS — E11.65 TYPE 2 DIABETES MELLITUS WITH HYPERGLYCEMIA, WITHOUT LONG-TERM CURRENT USE OF INSULIN (HCC): ICD-10-CM

## 2024-10-07 RX ORDER — ATORVASTATIN CALCIUM 20 MG/1
20 TABLET, FILM COATED ORAL NIGHTLY
Qty: 100 TABLET | Refills: 0 | Status: SHIPPED | OUTPATIENT
Start: 2024-10-07

## 2024-10-15 ENCOUNTER — OFFICE VISIT (OUTPATIENT)
Dept: CARDIOLOGY | Facility: MEDICAL CENTER | Age: 79
End: 2024-10-15
Attending: PHYSICIAN ASSISTANT
Payer: MEDICARE

## 2024-10-15 VITALS
BODY MASS INDEX: 19.18 KG/M2 | OXYGEN SATURATION: 100 % | SYSTOLIC BLOOD PRESSURE: 106 MMHG | RESPIRATION RATE: 15 BRPM | DIASTOLIC BLOOD PRESSURE: 58 MMHG | WEIGHT: 134 LBS | HEART RATE: 61 BPM | HEIGHT: 70 IN

## 2024-10-15 DIAGNOSIS — E78.5 DYSLIPIDEMIA: Chronic | ICD-10-CM

## 2024-10-15 DIAGNOSIS — I70.0 ATHEROSCLEROSIS OF AORTA (HCC): Chronic | ICD-10-CM

## 2024-10-15 DIAGNOSIS — R93.1 AGATSTON CAC SCORE 200-399: Chronic | ICD-10-CM

## 2024-10-15 PROCEDURE — 3074F SYST BP LT 130 MM HG: CPT | Performed by: INTERNAL MEDICINE

## 2024-10-15 PROCEDURE — 99213 OFFICE O/P EST LOW 20 MIN: CPT | Performed by: INTERNAL MEDICINE

## 2024-10-15 PROCEDURE — 99212 OFFICE O/P EST SF 10 MIN: CPT | Performed by: INTERNAL MEDICINE

## 2024-10-15 PROCEDURE — 3078F DIAST BP <80 MM HG: CPT | Performed by: INTERNAL MEDICINE

## 2024-10-15 ASSESSMENT — FIBROSIS 4 INDEX: FIB4 SCORE: 1.43

## 2024-10-29 DIAGNOSIS — E11.65 TYPE 2 DIABETES MELLITUS WITH HYPERGLYCEMIA, WITHOUT LONG-TERM CURRENT USE OF INSULIN (HCC): ICD-10-CM

## 2024-10-29 RX ORDER — FLASH GLUCOSE SENSOR
KIT MISCELLANEOUS
Qty: 2 EACH | Refills: 0 | Status: SHIPPED | OUTPATIENT
Start: 2024-10-29

## 2024-10-31 ENCOUNTER — OFFICE VISIT (OUTPATIENT)
Dept: MEDICAL GROUP | Facility: IMAGING CENTER | Age: 79
End: 2024-10-31
Payer: MEDICARE

## 2024-10-31 VITALS
SYSTOLIC BLOOD PRESSURE: 118 MMHG | TEMPERATURE: 97.8 F | BODY MASS INDEX: 19.18 KG/M2 | RESPIRATION RATE: 16 BRPM | WEIGHT: 134 LBS | HEART RATE: 60 BPM | DIASTOLIC BLOOD PRESSURE: 76 MMHG | OXYGEN SATURATION: 99 % | HEIGHT: 70 IN

## 2024-10-31 DIAGNOSIS — E11.65 TYPE 2 DIABETES MELLITUS WITH HYPERGLYCEMIA, WITHOUT LONG-TERM CURRENT USE OF INSULIN (HCC): ICD-10-CM

## 2024-10-31 DIAGNOSIS — Z12.5 PROSTATE CANCER SCREENING: ICD-10-CM

## 2024-10-31 PROBLEM — M25.551 PAIN OF RIGHT HIP: Status: RESOLVED | Noted: 2024-08-26 | Resolved: 2024-10-31

## 2024-10-31 PROBLEM — H04.129 DRY EYE: Status: RESOLVED | Noted: 2022-08-04 | Resolved: 2024-10-31

## 2024-10-31 LAB
HBA1C MFR BLD: 6.1 % (ref ?–5.8)
POCT INT CON NEG: NEGATIVE
POCT INT CON POS: POSITIVE

## 2024-10-31 ASSESSMENT — FIBROSIS 4 INDEX: FIB4 SCORE: 1.43

## 2024-10-31 ASSESSMENT — PAIN SCALES - GENERAL: PAINLEVEL: NO PAIN

## 2024-11-22 DIAGNOSIS — E03.9 HYPOTHYROIDISM, UNSPECIFIED TYPE: ICD-10-CM

## 2024-11-22 NOTE — TELEPHONE ENCOUNTER
Received request via: Patient    Was the patient seen in the last year in this department? Yes    Does the patient have an active prescription (recently filled or refills available) for medication(s) requested? No    Pharmacy Name: [id number]    Does the patient have [facility] Plus and need 100-day supply? (This applies to ALL medications) Yes, quantity updated to 100 days

## 2024-11-25 RX ORDER — LEVOTHYROXINE SODIUM 50 UG/1
50 TABLET ORAL
Qty: 100 TABLET | Refills: 3 | Status: SHIPPED | OUTPATIENT
Start: 2024-11-25

## 2024-11-26 DIAGNOSIS — E11.65 TYPE 2 DIABETES MELLITUS WITH HYPERGLYCEMIA, WITHOUT LONG-TERM CURRENT USE OF INSULIN (HCC): ICD-10-CM

## 2024-11-26 RX ORDER — ATORVASTATIN CALCIUM 20 MG/1
20 TABLET, FILM COATED ORAL NIGHTLY
Qty: 100 TABLET | Refills: 3 | Status: SHIPPED | OUTPATIENT
Start: 2024-11-26

## 2024-11-26 NOTE — TELEPHONE ENCOUNTER
Received request via: Patient    Was the patient seen in the last year in this department? Yes    Does the patient have an active prescription (recently filled or refills available) for medication(s) requested? No    Pharmacy Name: [id number]    Does the patient have assisted Plus and need 100-day supply? (This applies to ALL medications) Patient does not have SCP

## 2025-01-06 DIAGNOSIS — E03.9 HYPOTHYROIDISM, UNSPECIFIED TYPE: ICD-10-CM

## 2025-01-06 DIAGNOSIS — E11.65 TYPE 2 DIABETES MELLITUS WITH HYPERGLYCEMIA, WITHOUT LONG-TERM CURRENT USE OF INSULIN (HCC): ICD-10-CM

## 2025-01-06 RX ORDER — LEVOTHYROXINE SODIUM 50 UG/1
50 TABLET ORAL
Qty: 100 TABLET | Refills: 3 | Status: SHIPPED | OUTPATIENT
Start: 2025-01-06

## 2025-01-06 RX ORDER — FLASH GLUCOSE SENSOR
KIT MISCELLANEOUS
Qty: 2 EACH | Refills: 0 | Status: SHIPPED | OUTPATIENT
Start: 2025-01-06

## 2025-01-06 NOTE — TELEPHONE ENCOUNTER
Received request via: Patient    Was the patient seen in the last year in this department? Yes    Does the patient have an active prescription (recently filled or refills available) for medication(s) requested? No    Pharmacy Name: [id number]    Does the patient have [facility] and need 100-day supply? (This applies to ALL medications) Patient has SCP but does not need 100-day supply

## 2025-01-06 NOTE — TELEPHONE ENCOUNTER
Received request via: Patient    Was the patient seen in the last year in this department? Yes    Does the patient have an active prescription (recently filled or refills available) for medication(s) requested? No    Pharmacy Name: [id number]    Does the patient have [facility] and need 100-day supply? (This applies to ALL medications) Yes, quantity updated to 100 days

## 2025-01-08 ENCOUNTER — OFFICE VISIT (OUTPATIENT)
Dept: DERMATOLOGY | Facility: IMAGING CENTER | Age: 80
End: 2025-01-08
Payer: MEDICARE

## 2025-01-08 DIAGNOSIS — L82.1 SK (SEBORRHEIC KERATOSIS): ICD-10-CM

## 2025-01-08 DIAGNOSIS — L81.4 LENTIGINES: ICD-10-CM

## 2025-01-08 DIAGNOSIS — L57.0 ACTINIC KERATOSIS: ICD-10-CM

## 2025-01-08 PROCEDURE — 17000 DESTRUCT PREMALG LESION: CPT | Performed by: NURSE PRACTITIONER

## 2025-01-08 PROCEDURE — 17003 DESTRUCT PREMALG LES 2-14: CPT | Performed by: NURSE PRACTITIONER

## 2025-01-08 PROCEDURE — 99212 OFFICE O/P EST SF 10 MIN: CPT | Mod: 25 | Performed by: NURSE PRACTITIONER

## 2025-01-09 NOTE — PROGRESS NOTES
DERMATOLOGY NOTE  FOLLOW UP VISIT       Chief complaint: SKs     Pt has a few of sks on scalp and under both arms.    History of skin cancer: No  History of precancers/actinic keratoses: Yes, Details: chest, back,face   History of biopsies:No  History of blistering/severe sunburns:Yes, Details: child   Family history of skin cancer:No  Family history of atypical moles:No      No Known Allergies     MEDICATIONS:  Medications relevant to specialty reviewed.     REVIEW OF SYSTEMS:   Positive for skin (see HPI)  Negative for fevers and chills       EXAM:  There were no vitals taken for this visit.  Constitutional: Well-developed, well-nourished, and in no distress.     A focused skin exam was performed including the affected areas of the face and scalp. Notable findings on exam today listed below and/or in assessment/plan.    Ill-defined erythematous gritty/scaly papules over the forehead scalp, vertex and crown  Several medium and dark brown stuck-on waxy papules scattered on the face and scalp  -sun exposed skin of the face and scalp with scattered clinically benign light brown reticulated macules all of which were morphologically similar and none of which were suspicious for skin cancer today on exam          IMPRESSION / PLAN:    1. Actinic keratosis  CRYOTHERAPY:  Risks (including, but not limited to: skin discoloration, redness, blister, blood blister, recurrence, need for further treatment, infection, scar) and benefits of cryotherapy discussed. Patient verbally agreed to proceed with treatment. 1 cryotherapy freeze thaw cycles of 10 seconds were applied to 4 lesions on scalp and forehead as noted on exam with cryac. Patient tolerated procedure well. Aftercare instructions given--no specific care needed unless irritated during healing process, can apply Vaseline with small band-aid if needed.      2. SK (seborrheic keratosis)  - Benign-appearing nature of lesions discussed during exam.   - Courtesy LNYahaira applied to  SK on R upper forehead for cosmesis  - Advised to continue to monitor for any return to clinic for new or concerning changes.      3. Lentigines  - Benign-appearing nature of lesions discussed during exam.   - Advised to continue to monitor for any return to clinic for new or concerning changes.          Pt understands risks associated with LN2, Patient verbalized understanding and agrees with plan regarding the above          Please note that this dictation was created using voice recognition software. I have made every reasonable attempt to correct obvious errors, but I expect that there are errors of grammar and possibly content that I did not discover before finalizing the note.      Return to clinic in: Return in about 3 months (around 4/8/2025) for FRANCESCO. and as needed for any new or changing skin lesions.

## 2025-02-02 DIAGNOSIS — E11.65 TYPE 2 DIABETES MELLITUS WITH HYPERGLYCEMIA, WITHOUT LONG-TERM CURRENT USE OF INSULIN (HCC): ICD-10-CM

## 2025-02-10 ENCOUNTER — APPOINTMENT (OUTPATIENT)
Dept: LAB | Facility: MEDICAL CENTER | Age: 80
End: 2025-02-10
Payer: MEDICARE

## 2025-02-11 ENCOUNTER — HOSPITAL ENCOUNTER (OUTPATIENT)
Dept: LAB | Facility: MEDICAL CENTER | Age: 80
End: 2025-02-11
Attending: NURSE PRACTITIONER
Payer: MEDICARE

## 2025-02-11 DIAGNOSIS — C88.00 WALDENSTROM MACROGLOBULINEMIA: ICD-10-CM

## 2025-02-11 LAB
ALBUMIN SERPL BCP-MCNC: 4.4 G/DL (ref 3.2–4.9)
ALBUMIN/GLOB SERPL: 1.3 G/DL
ALP SERPL-CCNC: 54 U/L (ref 30–99)
ALT SERPL-CCNC: 16 U/L (ref 2–50)
ANION GAP SERPL CALC-SCNC: 12 MMOL/L (ref 7–16)
AST SERPL-CCNC: 21 U/L (ref 12–45)
BASOPHILS # BLD AUTO: 0.9 % (ref 0–1.8)
BASOPHILS # BLD: 0.05 K/UL (ref 0–0.12)
BILIRUB SERPL-MCNC: 0.6 MG/DL (ref 0.1–1.5)
BUN SERPL-MCNC: 14 MG/DL (ref 8–22)
CALCIUM ALBUM COR SERPL-MCNC: 9.3 MG/DL (ref 8.5–10.5)
CALCIUM SERPL-MCNC: 9.6 MG/DL (ref 8.5–10.5)
CHLORIDE SERPL-SCNC: 101 MMOL/L (ref 96–112)
CO2 SERPL-SCNC: 24 MMOL/L (ref 20–33)
CREAT SERPL-MCNC: 0.95 MG/DL (ref 0.5–1.4)
EOSINOPHIL # BLD AUTO: 0.2 K/UL (ref 0–0.51)
EOSINOPHIL NFR BLD: 3.5 % (ref 0–6.9)
ERYTHROCYTE [DISTWIDTH] IN BLOOD BY AUTOMATED COUNT: 48.1 FL (ref 35.9–50)
FASTING STATUS PATIENT QL REPORTED: NORMAL
GFR SERPLBLD CREATININE-BSD FMLA CKD-EPI: 82 ML/MIN/1.73 M 2
GLOBULIN SER CALC-MCNC: 3.3 G/DL (ref 1.9–3.5)
GLUCOSE SERPL-MCNC: 106 MG/DL (ref 65–99)
HCT VFR BLD AUTO: 41 % (ref 42–52)
HGB BLD-MCNC: 14.3 G/DL (ref 14–18)
IMM GRANULOCYTES # BLD AUTO: 0.01 K/UL (ref 0–0.11)
IMM GRANULOCYTES NFR BLD AUTO: 0.2 % (ref 0–0.9)
LYMPHOCYTES # BLD AUTO: 2.49 K/UL (ref 1–4.8)
LYMPHOCYTES NFR BLD: 43 % (ref 22–41)
MCH RBC QN AUTO: 34 PG (ref 27–33)
MCHC RBC AUTO-ENTMCNC: 34.9 G/DL (ref 32.3–36.5)
MCV RBC AUTO: 97.6 FL (ref 81.4–97.8)
MONOCYTES # BLD AUTO: 0.68 K/UL (ref 0–0.85)
MONOCYTES NFR BLD AUTO: 11.7 % (ref 0–13.4)
NEUTROPHILS # BLD AUTO: 2.36 K/UL (ref 1.82–7.42)
NEUTROPHILS NFR BLD: 40.7 % (ref 44–72)
NRBC # BLD AUTO: 0 K/UL
NRBC BLD-RTO: 0 /100 WBC (ref 0–0.2)
PLATELET # BLD AUTO: 260 K/UL (ref 164–446)
PMV BLD AUTO: 8.5 FL (ref 9–12.9)
POTASSIUM SERPL-SCNC: 4.3 MMOL/L (ref 3.6–5.5)
PROT SERPL-MCNC: 7.7 G/DL (ref 6–8.2)
RBC # BLD AUTO: 4.2 M/UL (ref 4.7–6.1)
SODIUM SERPL-SCNC: 137 MMOL/L (ref 135–145)
WBC # BLD AUTO: 5.8 K/UL (ref 4.8–10.8)

## 2025-02-11 PROCEDURE — 36415 COLL VENOUS BLD VENIPUNCTURE: CPT

## 2025-02-11 PROCEDURE — 80053 COMPREHEN METABOLIC PANEL: CPT

## 2025-02-11 PROCEDURE — 85025 COMPLETE CBC W/AUTO DIFF WBC: CPT

## 2025-02-11 PROCEDURE — 82784 ASSAY IGA/IGD/IGG/IGM EACH: CPT

## 2025-02-11 PROCEDURE — 84165 PROTEIN E-PHORESIS SERUM: CPT

## 2025-02-11 PROCEDURE — 84155 ASSAY OF PROTEIN SERUM: CPT

## 2025-02-11 PROCEDURE — 86334 IMMUNOFIX E-PHORESIS SERUM: CPT

## 2025-02-11 PROCEDURE — 83521 IG LIGHT CHAINS FREE EACH: CPT

## 2025-02-12 NOTE — PROGRESS NOTES
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   Date of Diagnosis: Jan 2022    Chief Complaint: Patient seen today for evaluation and ongoing management of waldenstrom's macroglobulinemia.     Care Team:   Etta Joe P.A.-C.     Oncology History of Presenting Illness: per APRN Barnes:  Patient referred to [facility] in 12/2021 per PCP for further evaluation of abnormal labs, macrocytosis with anemia. UPEP and SPEP were completed with serum immunoglobulins showing elevated IgM, low IgG, normal IgA, elevated kappa light chains and kappa/lambda ratio. Patient underwent bone marrow biopsy 1/21/2022 with findings consistent with lymphoplasmacytic lymphoma (Waldenström's macroglobulinemia): MYD88 positive, normal viscosity, CRAB criteria within acceptable limits. He proceeded with surveillance with Dr Pineda.      Current Treatment: surveillance labs q6mo  Interval History Eliezer SADLER:  Chirag returns to clinic today for surveillance visit. He reports some body wide, generalized stiffness that is most severe in his upper legs especially with certain yoga poses. When he doesn't stay on top of his stretching & exercise regimen, he notes some activity limitation as a result, but otherwise it is manageable. He denies B symptoms, lymphadenopathy, recurrent infections.       Allergies as of 02/13/2025    (No Known Allergies)       Current Outpatient Medications:     metFORMIN (GLUCOPHAGE) 500 MG Tab, Take 1 Tablet by mouth every day., Disp: 100 Tablet, Rfl: 1    levothyroxine (SYNTHROID) 50 MCG Tab, Take 1 Tablet by mouth every morning on an empty stomach., Disp: 100 Tablet, Rfl: 3    Continuous Glucose Sensor (FREESTYLE ALESSANDRO 14 DAY SENSOR) [facility], Apply to arm every 14 days, Disp: 2 Each, Rfl: 0    atorvastatin (LIPITOR) 20 MG Tab, Take 1 Tablet by mouth every evening., Disp: 100 Tablet, Rfl: 3    PEG 3350-KCl-NaBcb-NaCl-NaSulf  "(PEG-3350/ELECTROLYTES) 236 g Recon Aleajndro, , Disp: , Rfl:     levothyroxine (SYNTHROID) 25 MCG Tab, Take on Sundays with 50 mcg tablet., Disp: 12 Tablet, Rfl: 3    B Complex Vitamins (B COMPLEX-B12 PO), Take  by mouth every day., Disp: , Rfl:     VITAMIN A PO, Take  by mouth every day., Disp: , Rfl:     Cholecalciferol (VITAMIN D3 PO), Take  by mouth every day., Disp: , Rfl:   Review of Systems:  Review of Systems   Constitutional:  Negative for chills, diaphoresis, fever, malaise/fatigue and weight loss.   HENT:  Negative for tinnitus.    Eyes:  Negative for blurred vision and double vision.   Respiratory:  Negative for cough and shortness of breath.    Cardiovascular:  Negative for chest pain.   Gastrointestinal:  Negative for blood in stool, constipation, diarrhea, nausea and vomiting.   Genitourinary:  Negative for dysuria and hematuria.   Musculoskeletal:  Positive for back pain (mild stiffness), joint pain (mild) and myalgias (more stiffness). Negative for falls.   Skin:  Negative for rash.   Neurological:  Negative for dizziness, tingling, sensory change and headaches.   Endo/Heme/Allergies:  Does not bruise/bleed easily.     Physical Exam:  Vitals:    02/13/25 1354   BP: 125/60   BP Location: Right arm   Patient Position: Sitting   BP Cuff Size: Adult   Pulse: 65   Temp: 36 °C (96.8 °F)   TempSrc: Temporal   Weight: 61.2 kg (135 lb)   Height: 1.778 m (5' 10\")     Physical Exam  Constitutional:       Appearance: Normal appearance.   HENT:      Head: Normocephalic and atraumatic.   Eyes:      Conjunctiva/sclera: Conjunctivae normal.   Cardiovascular:      Rate and Rhythm: Normal rate and regular rhythm.      Heart sounds: Murmur (very faint) heard.   Pulmonary:      Effort: Pulmonary effort is normal.      Breath sounds: Normal breath sounds.   Abdominal:      General: Abdomen is flat. Bowel sounds are normal.      Palpations: Abdomen is soft.   Lymphadenopathy:      Head:      Right side of head: No submental, " submandibular, tonsillar, preauricular, posterior auricular or occipital adenopathy.      Left side of head: No submental, submandibular, tonsillar, preauricular, posterior auricular or occipital adenopathy.      Cervical: No cervical adenopathy.      Right cervical: No superficial, deep or posterior cervical adenopathy.     Left cervical: No superficial, deep or posterior cervical adenopathy.      Upper Body:      Right upper body: No supraclavicular, axillary, pectoral or epitrochlear adenopathy.      Left upper body: No supraclavicular, axillary, pectoral or epitrochlear adenopathy.      Lower Body: No right inguinal adenopathy. No left inguinal adenopathy.   Neurological:      Mental Status: He is alert.   Psychiatric:         Mood and Affect: Mood normal.         Behavior: Behavior normal.       Labs: reviewed with pt today   Latest Reference Range & Units 02/11/25 06:57   WBC 4.8 - 10.8 K/uL 5.8   RBC 4.70 - 6.10 M/uL 4.20 (L)   Hemoglobin 14.0 - 18.0 g/dL 14.3   Hematocrit 42.0 - 52.0 % 41.0 (L)   MCV 81.4 - 97.8 fL 97.6   MCH 27.0 - 33.0 pg 34.0 (H)   MCHC 32.3 - 36.5 g/dL 34.9   RDW 35.9 - 50.0 fL 48.1   Platelet Count 164 - 446 K/uL 260   MPV 9.0 - 12.9 fL 8.5 (L)   Neutrophils-Polys 44.00 - 72.00 % 40.70 (L)   Neutrophils (Absolute) 1.82 - 7.42 K/uL 2.36   Lymphocytes 22.00 - 41.00 % 43.00 (H)   Lymphs (Absolute) 1.00 - 4.80 K/uL 2.49   Monocytes 0.00 - 13.40 % 11.70   Monos (Absolute) 0.00 - 0.85 K/uL 0.68   Eosinophils 0.00 - 6.90 % 3.50   Eos (Absolute) 0.00 - 0.51 K/uL 0.20   Basophils 0.00 - 1.80 % 0.90   Baso (Absolute) 0.00 - 0.12 K/uL 0.05   Immature Granulocytes 0.00 - 0.90 % 0.20   Immature Granulocytes (abs) 0.00 - 0.11 K/uL 0.01   Nucleated RBC 0.00 - 0.20 /100 WBC 0.00   NRBC (Absolute) K/uL 0.00   Sodium 135 - 145 mmol/L 137   Potassium 3.6 - 5.5 mmol/L 4.3   Chloride 96 - 112 mmol/L 101   Co2 20 - 33 mmol/L 24   Anion Gap 7.0 - 16.0  12.0   Glucose 65 - 99 mg/dL 106 (H)   Bun 8 - 22 mg/dL  14   Creatinine 0.50 - 1.40 mg/dL 0.95   GFR (CKD-EPI) >60 mL/min/1.73 m 2 82   Calcium 8.5 - 10.5 mg/dL 9.6   Correct Calcium 8.5 - 10.5 mg/dL 9.3   AST(SGOT) 12 - 45 U/L 21   ALT(SGPT) 2 - 50 U/L 16   Alkaline Phosphatase 30 - 99 U/L 54   Total Bilirubin 0.1 - 1.5 mg/dL 0.6   Albumin 3.2 - 4.9 g/dL 4.4   Total Protein 6.0 - 8.2 g/dL 7.7   Globulin 1.9 - 3.5 g/dL 3.3   A-G Ratio g/dL 1.3   Fasting Status  Fasting      Latest Reference Range & Units 02/16/24 07:44 02/11/25 06:57   Immunoglobulin A 68 - 408 mg/dL 81 72   Immunoglobulin G 768 - 1632 mg/dL 786 844   Immunoglobulin M 35 - 263 mg/dL 1082 (H) 1076 (H)      [facility] Reference Range & Units 02/16/24 07:44 02/11/25 06:57   Interpretation  See Note See Note   Albumin 3.75 - 5.01 g/dL 4.23 4.41   Gamma Globulin 0.62 - 1.51 g/dL 1.45 1.47   Alpha-1 Globulin 0.19 - 0.46 g/dL 0.26 0.25   Alpha-2 Globulin 0.48 - 1.05 g/dL 0.75 0.77   Beta Globulin 0.48 - 1.10 g/dL 0.61 0.61   Free Kappa Light Chains 3.30 - 19.40 mg/L 45.72 (H) 41.69 (H)   Free Lambda Light Chains 5.71 - 26.30 mg/L 7.90 9.01   Kappa-Lambda Ratio 0.26 - 1.65  5.79 (H) 4.63 (H)   Immunofixation  MARJAN Done MARJAN Done   Monoclonal Protein <=0.00 g/dL 0.94 0.96 (H)   EER Monoclonal Protein and FLC, Serum  See Note See Note         Assessment & Plan:  1. Waldenstrom macroglobulinemia  Monoclonal Protein and FLC, Serum    CBC WITH DIFFERENTIAL    Comp Metabolic Panel      2. Oncology follow-up encounter        3. Spinal stenosis of lumbar region with neurogenic claudication        4. 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 Dx Jan 2022, on surveillance since Dx.    stable  -CRAB criteria negative, no B symptoms, labs are stable  -Continue to trend CBC, CMP, monoclonal protein studies every 6 months    2. Generalized stiffness/muscle tension- mild to moderate, occasional interference with ADLs. Symptoms are not consistent with a classic neurogenic  claudication, osteoarthritis, muscle spasm. Advised continued exercise/stretching regimen, pt is not interested in trying OTC meds at this time.  I reviewed MRI LSP from may 2017 which does show some stenosis and spondylosis at L4-5 which partially explains his symptoms. If symptoms progress I recommend XR LSP (flexion, extension, AP, Lat) & updated MRI w/o, if he would be amenable to neurosurgical consultation. However, at this time he is doing all of his ADLs & lumbar stenosis would not explain the upper body stiffness    Return for Follow Up: 6mo with labs     Any questions and concerns raised by the patient were answered to the best of my ability. Thank you for allowing me to participate in the care for this patient. Please feel free to contact me for any questions or concerns.   Total time spent on chart review, clinic encounter, documentation, coordination of care: 30 minutes.   Please note that this dictation was created using voice recognition software. I have made every reasonable attempt to correct obvious errors, but I expect that there are errors of grammar and possibly content that I did not discover before finalizing the note.

## 2025-02-13 ENCOUNTER — HOSPITAL ENCOUNTER (OUTPATIENT)
Dept: HEMATOLOGY ONCOLOGY | Facility: MEDICAL CENTER | Age: 80
End: 2025-02-13
Attending: PHYSICIAN ASSISTANT
Payer: MEDICARE

## 2025-02-13 VITALS
TEMPERATURE: 96.8 F | HEIGHT: 70 IN | WEIGHT: 135 LBS | SYSTOLIC BLOOD PRESSURE: 125 MMHG | HEART RATE: 65 BPM | DIASTOLIC BLOOD PRESSURE: 60 MMHG | BODY MASS INDEX: 19.33 KG/M2

## 2025-02-13 DIAGNOSIS — M62.89 MUSCLE STIFFNESS: ICD-10-CM

## 2025-02-13 DIAGNOSIS — C88.00 WALDENSTROM MACROGLOBULINEMIA: ICD-10-CM

## 2025-02-13 DIAGNOSIS — M48.062 SPINAL STENOSIS OF LUMBAR REGION WITH NEUROGENIC CLAUDICATION: Chronic | ICD-10-CM

## 2025-02-13 DIAGNOSIS — Z09 ONCOLOGY FOLLOW-UP ENCOUNTER: ICD-10-CM

## 2025-02-13 PROCEDURE — 99212 OFFICE O/P EST SF 10 MIN: CPT | Performed by: PHYSICIAN ASSISTANT

## 2025-02-13 PROCEDURE — 99214 OFFICE O/P EST MOD 30 MIN: CPT | Performed by: PHYSICIAN ASSISTANT

## 2025-02-13 ASSESSMENT — ENCOUNTER SYMPTOMS
SHORTNESS OF BREATH: 0
BACK PAIN: 1
NAUSEA: 0
DIAPHORESIS: 0
CONSTIPATION: 0
DIZZINESS: 0
DOUBLE VISION: 0
BLURRED VISION: 0
CHILLS: 0
MYALGIAS: 1
WEIGHT LOSS: 0
DIARRHEA: 0
HEADACHES: 0
COUGH: 0
TINGLING: 0
FEVER: 0
VOMITING: 0
BLOOD IN STOOL: 0
FALLS: 0
BRUISES/BLEEDS EASILY: 0
SENSORY CHANGE: 0

## 2025-02-13 ASSESSMENT — FIBROSIS 4 INDEX: FIB4 SCORE: 1.575

## 2025-02-14 LAB
ALBUMIN SERPL ELPH-MCNC: 4.41 G/DL (ref 3.75–5.01)
ALPHA1 GLOB SERPL ELPH-MCNC: 0.25 G/DL (ref 0.19–0.46)
ALPHA2 GLOB SERPL ELPH-MCNC: 0.77 G/DL (ref 0.48–1.05)
B-GLOBULIN SERPL ELPH-MCNC: 0.61 G/DL (ref 0.48–1.1)
EER MONOCLONAL PROTEIN AND FLC, SERUM Q5224: ABNORMAL
GAMMA GLOB SERPL ELPH-MCNC: 1.47 G/DL (ref 0.62–1.51)
IGA SERPL-MCNC: 72 MG/DL (ref 68–408)
IGG SERPL-MCNC: 844 MG/DL (ref 768–1632)
IGM SERPL-MCNC: 1076 MG/DL (ref 35–263)
INTERPRETATION SERPL IFE-IMP: ABNORMAL
INTERPRETATION SERPL IFE-IMP: ABNORMAL
KAPPA LC FREE SER-MCNC: 41.69 MG/L (ref 3.3–19.4)
KAPPA LC FREE/LAMBDA FREE SER NEPH: 4.63 {RATIO} (ref 0.26–1.65)
LAMBDA LC FREE SERPL-MCNC: 9.01 MG/L (ref 5.71–26.3)
MONOCLONAL PROTEIN NL11656: 0.96 G/DL
PROT SERPL-MCNC: 7.5 G/DL (ref 6.3–8.2)

## 2025-02-26 DIAGNOSIS — B00.1 COLD SORE: ICD-10-CM

## 2025-02-26 RX ORDER — ACYCLOVIR 400 MG/1
400 TABLET ORAL 3 TIMES DAILY
Qty: 21 TABLET | Refills: 0 | Status: SHIPPED | OUTPATIENT
Start: 2025-02-26 | End: 2025-03-05

## 2025-02-26 NOTE — TELEPHONE ENCOUNTER
Pt is requesting cream as well.       Received request via: Patient    Was the patient seen in the last year in this department? Yes    Does the patient have an active prescription (recently filled or refills available) for medication(s) requested? No    [facility]    Does the patient have long-term Plus and need 100-day supply? (This applies to ALL medications) Yes, quantity updated to 100 days

## 2025-02-26 NOTE — TELEPHONE ENCOUNTER
Refill done for acyclovir, no topical cream see in his chart, not sure which he is requesting.   BLAIRE Brooks.

## 2025-03-05 DIAGNOSIS — B00.1 COLD SORE: ICD-10-CM

## 2025-03-06 DIAGNOSIS — B00.1 COLD SORE: ICD-10-CM

## 2025-03-06 RX ORDER — ACYCLOVIR 400 MG/1
400 TABLET ORAL 3 TIMES DAILY
Qty: 21 TABLET | Refills: 0 | Status: SHIPPED | OUTPATIENT
Start: 2025-03-06 | End: 2025-03-13

## 2025-03-06 RX ORDER — ACYCLOVIR 50 MG/G
1 OINTMENT TOPICAL EVERY 4 HOURS
Qty: 15 G | Refills: 0 | Status: SHIPPED | OUTPATIENT
Start: 2025-03-06

## 2025-03-06 NOTE — TELEPHONE ENCOUNTER
Received request via: Patient    Was the patient seen in the last year in this department? Yes    Does the patient have an active prescription (recently filled or refills available) for medication(s) requested? No    Pharmacy Name: [id number]    Does the patient have [facility] Plus and need 100-day supply? (This applies to ALL medications) Yes, quantity updated to 100 days    Pt stated that he would like his acyclovir as a cream and tablet.

## 2025-03-18 ENCOUNTER — APPOINTMENT (OUTPATIENT)
Dept: MEDICAL GROUP | Facility: IMAGING CENTER | Age: 80
End: 2025-03-18
Payer: MEDICARE

## 2025-04-01 ENCOUNTER — RESULTS FOLLOW-UP (OUTPATIENT)
Dept: MEDICAL GROUP | Facility: IMAGING CENTER | Age: 80
End: 2025-04-01

## 2025-04-01 ENCOUNTER — APPOINTMENT (OUTPATIENT)
Dept: MEDICAL GROUP | Facility: IMAGING CENTER | Age: 80
End: 2025-04-01
Payer: MEDICARE

## 2025-04-01 ENCOUNTER — HOSPITAL ENCOUNTER (OUTPATIENT)
Dept: LAB | Facility: MEDICAL CENTER | Age: 80
End: 2025-04-01
Attending: PHYSICIAN ASSISTANT
Payer: MEDICARE

## 2025-04-01 VITALS
SYSTOLIC BLOOD PRESSURE: 120 MMHG | WEIGHT: 141 LBS | TEMPERATURE: 97.7 F | OXYGEN SATURATION: 96 % | RESPIRATION RATE: 16 BRPM | DIASTOLIC BLOOD PRESSURE: 76 MMHG | HEART RATE: 67 BPM | HEIGHT: 70 IN | BODY MASS INDEX: 20.19 KG/M2

## 2025-04-01 DIAGNOSIS — Z12.5 PROSTATE CANCER SCREENING: ICD-10-CM

## 2025-04-01 DIAGNOSIS — M79.89 LEFT LEG SWELLING: ICD-10-CM

## 2025-04-01 DIAGNOSIS — E03.9 HYPOTHYROIDISM, UNSPECIFIED TYPE: ICD-10-CM

## 2025-04-01 DIAGNOSIS — R42 DIZZINESS: ICD-10-CM

## 2025-04-01 DIAGNOSIS — E11.65 TYPE 2 DIABETES MELLITUS WITH HYPERGLYCEMIA, WITHOUT LONG-TERM CURRENT USE OF INSULIN (HCC): ICD-10-CM

## 2025-04-01 LAB
HBA1C MFR BLD: 6 % (ref ?–5.8)
POCT INT CON NEG: NEGATIVE
POCT INT CON POS: POSITIVE
PSA SERPL DL<=0.01 NG/ML-MCNC: 0.22 NG/ML (ref 0–4)
T4 FREE SERPL-MCNC: 1.56 NG/DL (ref 0.93–1.7)
TSH SERPL DL<=0.005 MIU/L-ACNC: 5.87 UIU/ML (ref 0.38–5.33)

## 2025-04-01 PROCEDURE — 84153 ASSAY OF PSA TOTAL: CPT

## 2025-04-01 PROCEDURE — 83036 HEMOGLOBIN GLYCOSYLATED A1C: CPT | Performed by: PHYSICIAN ASSISTANT

## 2025-04-01 PROCEDURE — 84443 ASSAY THYROID STIM HORMONE: CPT

## 2025-04-01 PROCEDURE — 36415 COLL VENOUS BLD VENIPUNCTURE: CPT

## 2025-04-01 PROCEDURE — 84439 ASSAY OF FREE THYROXINE: CPT

## 2025-04-01 PROCEDURE — 3078F DIAST BP <80 MM HG: CPT | Performed by: PHYSICIAN ASSISTANT

## 2025-04-01 PROCEDURE — 99214 OFFICE O/P EST MOD 30 MIN: CPT | Mod: 25 | Performed by: PHYSICIAN ASSISTANT

## 2025-04-01 PROCEDURE — 3074F SYST BP LT 130 MM HG: CPT | Performed by: PHYSICIAN ASSISTANT

## 2025-04-01 PROCEDURE — 1126F AMNT PAIN NOTED NONE PRSNT: CPT | Performed by: PHYSICIAN ASSISTANT

## 2025-04-01 RX ORDER — ATORVASTATIN CALCIUM 20 MG/1
20 TABLET, FILM COATED ORAL NIGHTLY
Qty: 100 TABLET | Refills: 3 | Status: SHIPPED | OUTPATIENT
Start: 2025-04-01

## 2025-04-01 ASSESSMENT — PATIENT HEALTH QUESTIONNAIRE - PHQ9: CLINICAL INTERPRETATION OF PHQ2 SCORE: 0

## 2025-04-01 ASSESSMENT — PAIN SCALES - GENERAL: PAINLEVEL_OUTOF10: NO PAIN

## 2025-04-01 ASSESSMENT — FIBROSIS 4 INDEX: FIB4 SCORE: 1.575

## 2025-04-01 NOTE — PATIENT INSTRUCTIONS
It was a pleasure meeting with you today at [facility]!    Your medical history/records and medications were reviewed today.     UPDATE on MyChart Results: If you have blood work, and/or imaging studies, or any other test or procedure completed, you will have access to results as soon as they become available in MyChart. Recently, these results will be available for review at the same time that your provider is able to see results!    This will likely mean you will see a result before your provider has had a chance to review and discuss with you.  Some results or care notes may be hard to understand and may be serious in nature.    We look at every result and your provider will contact you to explain what they mean and discuss appropriate next steps. Please allow for at least 72 business hours for chart and result review.     We prefer that you wait for your care team to contact you with your results.  Often, your provider will discuss your results with you at your next appointment. We look forward to continuing to partner with you in your care.    Please review my practice information below:    If you have any prescription refill requests, please send them via [employer] or discuss with your provider at the start of your office visits. Please allow 3-5 business days for lab and testing review and you will be contacted via [employer] with those results, or if advised to make a follow up appointment regarding those results, then please do so.     Once resulted, your lab/test/imaging results will show up automatically in your MyChart. Please wait for my interpretation and recommendations prior to viewing your results to avoid any unnecessary confusion or misinterpretation. I will address all of the lab values that I interpret as abnormal and message you accordingly on your MyChart. I will always send you a message about your results even if they are normal. If you do not hear back from me within 5-7 business  days after completing your tests, then please send me a message on [employer] so I can obtain your results (especially if you went to an outside lab or imaging center - [facility], [facility], etc).     If you have any additional questions or concerns beyond my interpretation of your results, please make an appointment with me to discuss in further detail.    Please only use the [employer] messaging system for questions regarding your most recent appointment or if advised to use otherwise (glucose or blood pressure reporting).     If you have any new problems or concerns, you must make an appointment to discuss. This includes any referral requests, lab requests (unless advised to notify me for pre-appt labs), medication side effects, or request for medication adjustments.     Please arrive 15 minutes prior to your appointment time to complete your check-in and intake with the medical assistant.      Thank you,    Etta Joe PA-C ([place])  Physician Assistant Certified  [facility]    -----------------------------------------------------------------    Attn: Patients of [facility]:    In an effort to continue to provide excellent and efficient care to our patients, it is vital that we continue to use our resources appropriately. With that, this is a reminder that [employer] is used for prescription refill requests, test results, virtual visits, and chart review only.     Any new questions, concerns/conditions, lab/imaging requests, medication adjustments, new prescriptions, or referral requests do require an appointment (virtually or in person), unless discussed otherwise at your most recent appointment.     Thank you for your understanding,    [facility]

## 2025-04-01 NOTE — ASSESSMENT & PLAN NOTE
Patient states that he was recently traveling via airplane and has noticed some left leg swelling over the past week.  He has been elevating his leg with relief.  No calf pain, but does admit to lower extremity cramping.

## 2025-04-01 NOTE — ASSESSMENT & PLAN NOTE
Patient with chronic episodic dizziness/lightheadedness.  States that its most notable when he gets out of bed too quickly or looks up towards the yeny.  No chest pain or palpitations.

## 2025-04-01 NOTE — ASSESSMENT & PLAN NOTE
Chronic, patient states he is only been taking levothyroxine 50 mcg daily, rather than the additional 25 mcg tablet on Sundays.

## 2025-04-01 NOTE — PROGRESS NOTES
Subjective:     CC:   Chief Complaint   Patient presents with    Diabetes Follow-up       HPI:   Chirag presents today to discuss:    Hypothyroidism  Chronic, patient states he is only been taking levothyroxine 50 mcg daily, rather than the additional 25 mcg tablet on Sundays.    Type 2 diabetes mellitus with hyperglycemia, without long-term current use of insulin (HCC)  Chronic, controlled stable on metformin 500 mg daily.  No side effects..  Up-to-date on eye exam.    Dizziness  Patient with chronic episodic dizziness/lightheadedness.  States that its most notable when he gets out of bed too quickly or looks up towards the yeny.  No chest pain or palpitations.    Left leg swelling  Patient states that he was recently traveling via airplane and has noticed some left leg swelling over the past week.  He has been elevating his leg with relief.  No calf pain, but does admit to lower extremity cramping.      Past Medical History:   Diagnosis Date    Cataract     alonso iol    DDD (degenerative disc disease), cervical     DDD (degenerative disc disease), cervical     DDD (degenerative disc disease), lumbar     Dyslipidemia     Heart murmur     Hiatus hernia syndrome     Hyponatremia 04/13/2021    Lumbar spinal stenosis     Pain of right hip 08/26/2024    Periodontitis 04/10/2024    Vitamin D deficiency 02/02/2018     Family History   Problem Relation Age of Onset    Cancer Mother 50        breast    Cancer Paternal Grandmother 70        colon cancer     Past Surgical History:   Procedure Laterality Date    WV DX BONE MARROW ASPIRATIONS Left 1/21/2022    Procedure: ASPIRATION, BONE MARROW - DR SANCHEZ;  Surgeon: Tyrel Mcintyre M.D.;  Location: SURGERY SAME DAY [facility];  Service: Orthopedics    WV DX BONE MARROW BIOPSIES Left 1/21/2022    Procedure: BIOPSY, BONE MARROW, USING NEEDLE OR TROCAR;  Surgeon: Tyrel Mcintyre M.D.;  Location: SURGERY SAME DAY [facility];  Service: Orthopedics    INGUINAL HERNIA REPAIR ROBOTIC Left  4/8/2016    Procedure: INGUINAL HERNIA REPAIR ROBOTIC WITH MESH;  Surgeon: Dustin Maria M.D.;  Location: SURGERY SAME DAY [facility];  Service:     OTHER  2006    alonso iol    CATARACT EXTRACTION WITH IOL Bilateral     HERNIA REPAIR      bilat     Social History     Tobacco Use    Smoking status: Never    Smokeless tobacco: Never   Vaping Use    Vaping status: Some Days   Substance Use Topics    Alcohol use: Yes     Alcohol/week: 0.6 oz     Types: 1 Glasses of wine per week     Comment: 4-5/week; beer once in a while. 1/18/22: 1 drink per day.    Drug use: No     Social History     Social History Narrative    Not on file     Current Outpatient Medications Ordered in Epic   Medication Sig Dispense Refill    atorvastatin (LIPITOR) 20 MG Tab Take 1 Tablet by mouth every evening. 100 Tablet 3    acyclovir (ZOVIRAX) 5 % Ointment Apply 1 Application topically every 4 hours. 15 g 0    metFORMIN (GLUCOPHAGE) 500 MG Tab Take 1 Tablet by mouth every day. 100 Tablet 1    levothyroxine (SYNTHROID) 50 MCG Tab Take 1 Tablet by mouth every morning on an empty stomach. 100 Tablet 3    Continuous Glucose Sensor (FREESTYLE ALESSANDRO 14 DAY SENSOR) Misc Apply to arm every 14 days 2 Each 0    PEG 3350-KCl-NaBcb-NaCl-NaSulf (PEG-3350/ELECTROLYTES) 236 g Recon Soln       B Complex Vitamins (B COMPLEX-B12 PO) Take  by mouth every day.      VITAMIN A PO Take  by mouth every day.      Cholecalciferol (VITAMIN D3 PO) Take  by mouth every day.       No current [facility]-ordered facility-administered medications on file.     Patient has no known allergies.    PMH/PSH/FH/Social history reviewed.  Vaccinations discussed.  Previous records and labs reviewed. Discussed age appropriate anticipatory guidance.    ROS: see hpi  Gen: no fevers/chills  Pulm: no sob, no cough  CV: no chest pain, no palpitations, no edema  GI: no nausea/vomiting, no diarrhea  Skin: no rash    Objective:   Exam:  /76 (BP Location: Right arm, Patient Position: Sitting, BP  "Cuff Size: Adult)   Pulse 67   Temp 36.5 °C (97.7 °F) (Temporal)   Resp 16   Ht 1.778 m (5' 10\")   Wt 64 kg (141 lb)   SpO2 96%   BMI 20.23 kg/m²    Body mass index is 20.23 kg/m².    Gen: Alert and oriented, No apparent distress.  HEENT: Head atraumatic, normocephalic. Pupils equal and round.  Neck: Neck is supple without lymphadenopathy.   Lungs: Normal effort, CTA bilaterally, no wheezes, rhonchi, or rales  CV: Regular rate and rhythm. No rubs or gallops.  Ext: No clubbing, cyanosis, edema.  Negative Homans' sign.    EKG Interpretation   Ordered and interpreted by Etta Joe PA-C  Rhythm: normal sinus   Rate: normal   Axis: normal   Ectopy: none   Conduction: normal   ST Segments: no acute change   T Waves: no acute change   Q Waves: Breath so no PVCs or anything weird none   Clinical Impression: no acute changes and normal EKG      Assessment & Plan:     79 y.o. male with the following -     1. Type 2 diabetes mellitus with hyperglycemia, without long-term current use of insulin (HCC)  Chronic, controlled and stable. Continue current regimen -try adding co-Q10 with atorvastatin due to leg cramping.  May also trial magnesium glycinate nightly.  - POCT  A1C  - atorvastatin (LIPITOR) 20 MG Tab; Take 1 Tablet by mouth every evening.  Dispense: 100 Tablet; Refill: 3    2. Hypothyroidism, unspecified type  Chronic, controlled and stable. Continue current regimen -levothyroxine 50 mcg daily.  - TSH WITH REFLEX TO FT4; Future    3. Dizziness  Chronic, intermittent episodes of lightheadedness.  Discussed getting out of bed slower and letting legs dangle, as well as, increasing fluids/electrolytes.  EKG within normal limits.  Check echocardiogram for completeness.  Follow with cardiology +/- neurology if persists or worsens.  - EC-ECHOCARDIOGRAM COMPLETE W/O CONT; Future  - EKG    4. Left leg swelling  Rule out DVT due to recent travel.  Check stat Doppler.  ER if shortness of breath or worsening symptoms in the " interim.  - US-EXTREMITY VENOUS LOWER UNILAT LEFT; Future    5. Prostate cancer screening  - PROSTATE SPECIFIC AG SCREENING; Future    Return in about 3 months (around 7/16/2025) for Annual wellness exam.    Etta Joe PA-C ([place])  Physician Assistant Certified  [facility]      Please note that this dictation was created using voice recognition software. I have made every reasonable attempt to correct obvious errors, but I expect that there are errors of grammar and possibly content that I did not discover before finalizing the note.

## 2025-04-07 DIAGNOSIS — E11.65 TYPE 2 DIABETES MELLITUS WITH HYPERGLYCEMIA, WITHOUT LONG-TERM CURRENT USE OF INSULIN (HCC): ICD-10-CM

## 2025-04-07 RX ORDER — FLASH GLUCOSE SENSOR
KIT MISCELLANEOUS
Qty: 2 EACH | Refills: 0 | Status: SHIPPED | OUTPATIENT
Start: 2025-04-07

## 2025-04-07 NOTE — TELEPHONE ENCOUNTER
Received request via: Patient    Was the patient seen in the last year in this department? Yes    Does the patient have an active prescription (recently filled or refills available) for medication(s) requested? No    Pharmacy Name: [id number]    Does the patient have residential Plus and need 100-day supply? (This applies to ALL medications) Yes, quantity updated to 100 days

## 2025-04-10 DIAGNOSIS — E03.9 HYPOTHYROIDISM, UNSPECIFIED TYPE: ICD-10-CM

## 2025-04-10 RX ORDER — LEVOTHYROXINE SODIUM 50 UG/1
50 TABLET ORAL
Qty: 100 TABLET | Refills: 3 | Status: SHIPPED | OUTPATIENT
Start: 2025-04-10

## 2025-04-10 NOTE — TELEPHONE ENCOUNTER
Received request via: Patient    Was the patient seen in the last year in this department? Yes    Does the patient have an active prescription (recently filled or refills available) for medication(s) requested? No    [facility]acy [facility]: [id number]    Does the patient have [facility] Plus and need 100-day supply? (This applies to ALL medications) Yes, quantity updated to 100 days

## 2025-05-12 DIAGNOSIS — E11.65 TYPE 2 DIABETES MELLITUS WITH HYPERGLYCEMIA, WITHOUT LONG-TERM CURRENT USE OF INSULIN (HCC): ICD-10-CM

## 2025-05-12 RX ORDER — FLASH GLUCOSE SENSOR
KIT MISCELLANEOUS
Qty: 2 EACH | Refills: 5 | Status: SHIPPED | OUTPATIENT
Start: 2025-05-12 | End: 2025-05-19 | Stop reason: SDUPTHER

## 2025-05-12 NOTE — TELEPHONE ENCOUNTER
Received request via: Patient    Was the patient seen in the last year in this department? Yes    Does the patient have an active prescription (recently filled or refills available) for medication(s) requested? No    Pharmacy Name: [id number]    Does the patient have senior living Plus and need 100-day supply? (This applies to ALL medications) Yes, quantity updated to 100 days

## 2025-05-12 NOTE — TELEPHONE ENCOUNTER
Received request via: Patient    Was the patient seen in the last year in this department? Yes    Does the patient have an active prescription (recently filled or refills available) for medication(s) requested? No    [id number]    Does the patient have [facility] Plus and need 100-day supply? (This applies to ALL medications) Yes, quantity updated to 100 days

## 2025-05-15 DIAGNOSIS — B00.1 COLD SORE: ICD-10-CM

## 2025-05-15 NOTE — TELEPHONE ENCOUNTER
Received request via: Patient    Was the patient seen in the last year in this department? Yes    Does the patient have an active prescription (recently filled or refills available) for medication(s) requested? No    Pharmacy Name: [id number]    Does the patient have long-term Plus and need 100-day supply? (This applies to ALL medications) Yes, quantity updated to 100 days

## 2025-05-19 DIAGNOSIS — E11.65 TYPE 2 DIABETES MELLITUS WITH HYPERGLYCEMIA, WITHOUT LONG-TERM CURRENT USE OF INSULIN (HCC): ICD-10-CM

## 2025-05-19 RX ORDER — ACYCLOVIR 50 MG/G
1 OINTMENT TOPICAL EVERY 4 HOURS
Qty: 15 G | Refills: 0 | Status: SHIPPED | OUTPATIENT
Start: 2025-05-19

## 2025-05-19 RX ORDER — FLASH GLUCOSE SENSOR
KIT MISCELLANEOUS
Qty: 2 EACH | Refills: 5 | Status: SHIPPED
Start: 2025-05-19 | End: 2025-05-20

## 2025-05-19 NOTE — TELEPHONE ENCOUNTER
Received request via: Patient    Was the patient seen in the last year in this department? Yes    Does the patient have an active prescription (recently filled or refills available) for medication(s) requested? No    [facility]     Does the patient have [facility] Plus and need 100-day supply? (This applies to ALL medications) Yes, quantity updated to 100 days

## 2025-05-20 DIAGNOSIS — E11.65 TYPE 2 DIABETES MELLITUS WITH HYPERGLYCEMIA, WITHOUT LONG-TERM CURRENT USE OF INSULIN (HCC): ICD-10-CM

## 2025-05-20 RX ORDER — HYDROCHLOROTHIAZIDE 12.5 MG/1
1 CAPSULE ORAL
Qty: 2 EACH | Refills: 5 | Status: SHIPPED | OUTPATIENT
Start: 2025-05-20

## 2025-05-20 RX ORDER — FLASH GLUCOSE SENSOR
KIT MISCELLANEOUS
Qty: 2 EACH | Refills: 5 | OUTPATIENT
Start: 2025-05-20

## 2025-05-20 NOTE — TELEPHONE ENCOUNTER
Received request via: Pharmacy    Was the patient seen in the last year in this department? Yes    Does the patient have an active prescription (recently filled or refills available) for medication(s) requested? No    Pharmacy Name: [id number]    Does the patient have care home Plus and need 100-day supply? (This applies to ALL medications) Yes, quantity updated to 100 days    Pharmacy comment: this is d/c already. Can we change to sensor 3 plus instead?

## 2025-06-24 ENCOUNTER — TELEPHONE (OUTPATIENT)
Dept: MEDICAL GROUP | Facility: IMAGING CENTER | Age: 80
End: 2025-06-24
Payer: MEDICARE

## 2025-06-24 NOTE — TELEPHONE ENCOUNTER
[facility] to reschedule a patient's appointment on 07/17/2025 with Etta Joe. Etta will be out-of-office at that time. Patient was instructed to call [phone] or use [employer] application to reschedule at their earliest convenience.

## 2025-07-01 DIAGNOSIS — E11.65 TYPE 2 DIABETES MELLITUS WITH HYPERGLYCEMIA, WITHOUT LONG-TERM CURRENT USE OF INSULIN (HCC): ICD-10-CM

## 2025-07-01 RX ORDER — HYDROCHLOROTHIAZIDE 12.5 MG/1
1 CAPSULE ORAL
Qty: 2 EACH | Refills: 5 | Status: SHIPPED | OUTPATIENT
Start: 2025-07-01

## 2025-07-01 NOTE — TELEPHONE ENCOUNTER
Received request via: Patient    Was the patient seen in the last year in this department? Yes    Does the patient have an active prescription (recently filled or refills available) for medication(s) requested? No    Pharmacy Name: [id number]    Does the patient have [facility]PC Plus and need 100-day supply? (This applies to ALL medications) Yes, quantity updated to 100 days

## 2025-07-07 ENCOUNTER — TELEPHONE (OUTPATIENT)
Dept: MEDICAL GROUP | Facility: IMAGING CENTER | Age: 80
End: 2025-07-07
Payer: MEDICARE

## 2025-07-07 NOTE — TELEPHONE ENCOUNTER
[facility] to reschedule a patient's appointment on 07/177/2025 with Etta Joe. Etta Joe will be out-of-office at that time. Patient was instructed to call [phone] or use the [employer] application to reschedule at their earliest convenience.

## 2025-07-08 NOTE — ASSESSMENT & PLAN NOTE
Chronic, stable. Currently taking levothyroxine 50 mcg daily as prescribed. Denies fatigue, palpitations, hair and skin changes, temperature intolerance, changes in bowel habits, and weight loss or weight gain. Managed by primary care provider.

## 2025-07-08 NOTE — ASSESSMENT & PLAN NOTE
Chronic, stable. Most recent hemoglobin A1C was 6.0 in April 2025. Currently taking metformin 500 mg daily. Routinely monitored by primary care provider.  --In-office retinopathy screening ordered.  --In-office urine protein screening ordered.

## 2025-07-08 NOTE — ASSESSMENT & PLAN NOTE
Chronic, stable. Reviewed lipid profile from July 2024. Currently taking atorvastatin 20 mg daily. Provided education on following a heart healthy diet with adequate intake of fruits, vegetables, and whole grains. Encourage 30 minutes of moderate exercise 3-4 times a week. Provided [facility] gym resources. Denies chest pain and claudication. Routinely monitored by primary care provider.

## 2025-07-08 NOTE — ASSESSMENT & PLAN NOTE
Chronic, stable. Reviewed MRI of the brain from March 2024 with age-related volume loss. Denies visual disturbances, loss of coordination, and changes in mentation. Monitored by primary care provider.

## 2025-07-10 ENCOUNTER — APPOINTMENT (OUTPATIENT)
Dept: MEDICAL GROUP | Facility: MEDICAL CENTER | Age: 80
End: 2025-07-10
Attending: FAMILY MEDICINE
Payer: MEDICARE

## 2025-07-10 DIAGNOSIS — E78.5 DYSLIPIDEMIA ASSOCIATED WITH TYPE 2 DIABETES MELLITUS (HCC): ICD-10-CM

## 2025-07-10 DIAGNOSIS — E11.69 DYSLIPIDEMIA ASSOCIATED WITH TYPE 2 DIABETES MELLITUS (HCC): ICD-10-CM

## 2025-07-10 DIAGNOSIS — E03.9 HYPOTHYROIDISM, UNSPECIFIED TYPE: ICD-10-CM

## 2025-07-10 DIAGNOSIS — E11.65 TYPE 2 DIABETES MELLITUS WITH HYPERGLYCEMIA, WITHOUT LONG-TERM CURRENT USE OF INSULIN (HCC): ICD-10-CM

## 2025-07-10 DIAGNOSIS — G31.9 CEREBRAL ATROPHY (HCC): ICD-10-CM

## 2025-07-10 NOTE — PROGRESS NOTES
Comprehensive Health Assessment Program     Chirag Damon Jr. is a 79 y.o. here for his comprehensive health assessment.    Patient Active Problem List    Diagnosis Date Noted    Left leg swelling 04/01/2025    Atherosclerosis of aorta (HCC) 05/09/2024    Anemia 01/23/2024    Dizziness 01/23/2024    Tinnitus of both ears 01/23/2024    Physician orders for life-sustaining treatment (POLST) form indicates patient wish for full code resuscitation status 01/23/2024    Dyslipidemia associated with type 2 diabetes mellitus (HCC)     Agatston CAC score 200-399. 2/2023 364 02/24/2023    Vitreous floaters of both eyes 12/16/2022    Cerebral atrophy (HCC) 12/16/2022    Numerous moles 04/04/2022    10 year risk of MI or stroke 7.5% or greater 04/04/2022    Plantar wart 03/07/2022    Waldenstrom macroglobulinemia (HCC) 11/29/2021    Seborrheic keratoses 04/13/2021    DDD (degenerative disc disease), lumbar 05/09/2017    Lumbar spinal stenosis 05/09/2017    Hypothyroidism 05/09/2017    Type 2 diabetes mellitus with hyperglycemia, without long-term current use of insulin (HCC) 05/09/2017    DDD (degenerative disc disease), cervical 10/16/2016    Hernia, inguinal, left 04/08/2016       Current Medications[1]       Current supplements as per medication list.     Allergies:   Patient has no known allergies.  Social History[2]  Family History   Problem Relation Age of Onset    Cancer Mother 50        breast    Cancer Paternal Grandmother 70        colon cancer     Chirag  has a past medical history of Cataract, DDD (degenerative disc disease), cervical, DDD (degenerative disc disease), cervical, DDD (degenerative disc disease), lumbar, Dyslipidemia, Heart murmur, Hiatus hernia syndrome, Hyponatremia (04/13/2021), Lumbar spinal stenosis, Pain of right hip (08/26/2024), Periodontitis (04/10/2024), and Vitamin D deficiency (02/02/2018).   Past Surgical History[3]    Screening:  In the last six months have you experienced any  leakage of urine? {YES / NO:00209}    Depression Screening  Little interest or pleasure in doing things?     Feeling down, depressed , or hopeless?    Trouble falling or staying asleep, or sleeping too much?     Feeling tired or having little energy?     Poor appetite or overeating?     Feeling bad about yourself - or that you are a failure or have let yourself or your family down?    Trouble concentrating on things, such as reading the newspaper or watching television?    Moving or speaking so slowly that other people could have noticed.  Or the opposite - being so fidgety or restless that you have been moving around a lot more than usual?     Thoughts that you would be better off dead, or of hurting yourself?     Patient Health Questionnaire Score:      If depressive symptoms identified deferred to follow up visit unless specifically addressed in assessment and plan.    Interpretation of PHQ-9 Total Score   Score Severity   1-4 No Depression   5-9 Mild Depression   10-14 Moderate Depression   15-19 Moderately Severe Depression   20-27 Severe Depression    Screening for Cognitive Impairment  Do you or any of your friends or family members have any concern about your memory?    Three Minute Recall (Village, Kitchen, Baby)  /3    Aguilar clock face with all 12 numbers and set the hands to show 10 minutes past 11.       Cognitive concerns identified deferred for follow up unless specifically addressed in assessment and plan.    Fall Risk Assessment  Has the patient had two or more falls in the last year or any fall with injury in the last year?       Safety Assessment  Do you always wear your seatbelt?     Any changes to home needed to function safely?    Difficulty hearing.     Patient counseled about all safety risks that were identified.    Functional Assessment ADLs  Are there any barriers preventing you from cooking for yourself or meeting nutritional needs?   .    Are there any barriers preventing you from driving  safely or obtaining transportation?   .    Are there any barriers preventing you from using a telephone or calling for help?       Are there any barriers preventing you from shopping?   .    Are there any barriers preventing you from taking care of your own finances?       Are there any barriers preventing you from managing your medications?       Are there any barriers preventing you from showering, bathing or dressing yourself?      Are there any barriers preventing you from doing housework or laundry?      Are there any barriers preventing you from using the toilet?     Are you currently engaging in any exercise or physical activity?   .      Self-Assessment of Health  What is your perception of your health?      Do you sleep more than six hours a night?      In the past 7 days, how much did pain keep you from doing your normal work?      Do you spend quality time with family or friends (virtually or in person)?      Do you usually eat a heart healthy diet that constists of a variety of fruits, vegetables, whole grains and fiber?      Do you eat foods high in fat and/or Fast Food more than three times per week?      How concerned are you that your medical conditions are not being well managed?      Are you worried that in the next 2 months, you may not have stable housing that you own, rent, or stay in as part of a household?          Advance Care Planning  Do you have an Advance Directive, Living Will, Durable Power of , or POLST?                   Health Maintenance Summary            Current Care Gaps       Colorectal Cancer Screening (Colonoscopy - Every 5 Years) Overdue since 10/27/2019      10/09/2023  Order placed for Referral to GI for Colonoscopy by Etta Joe P.A.-C.    02/14/2018  OCCULT BLOOD FECES IMMUNOASSAY    10/27/2014  REFERRAL TO GI FOR COLONOSCOPY              Annual Wellness Visit (Yearly) Overdue since 1/23/2025 01/23/2024  Done    12/16/2022  Done    10/14/2021  Visit Dx:  Medicare annual wellness visit, subsequent    10/14/2021  Subsequent Annual Wellness Visit - Includes PPPS ()    2016  Level of Service: PB PBEVENTIVE VISIT,NEW,65 & OVER     Only the first 5 history entries have been loaded, but more history exists.            COVID-19 Vaccine ( season) Overdue since 2025      10/25/2024  Imm Admin: Covid-19 Mrna (Spikevax) Moderna 12+ Years    2023  Imm Admin: PFIZER BIVALENT SARS-COV-2 VACCINE (12+)    10/11/2022  Imm Admin: MODERNA BIVALENT BOOSTER SARS-COV-2 VACCINE (6+)    2022  Imm Admin: MODERNA SARS-COV-2 VACCINE (12+)    2021  Imm Admin: MODERNA SARS-COV-2 VACCINE (12+)      Only the first 5 history entries have been loaded, but more history exists.              Fasting Lipid Profile (Yearly) Due soon on 2024  Lipid Profile    2023  Lipid Profile    2023  Lipid Profile    2020  Lipid Profile    03/15/2019  Lipid Profile      Only the first 5 history entries have been loaded, but more history exists.                      Needs Review       Hepatitis C Screening  Tentatively Complete      2021  Hepatitis C Antibody component of HCV Scrn ( 7366-9498 1xLife)                      Awaiting Completion       Diabetes: Retinopathy Screening (Yearly) Order placed this encounter      2024  RETINAL SCREENING RESULTS    2023  AMB EXTERNAL RETINAL SCREENING RESULTS              Diabetes: Urine Protein Screening (Yearly) Order placed this encounter      2024  Microalbumin Creat Ratio Urine (Clinic Collect)                      Upcoming       Influenza Vaccine (1) Next due on 2025      10/25/2024  Imm Admin: Influenza, unspecified formulation    10/31/2023  Imm Admin: Influenza Vaccine Adult HD    2022  Imm Admin: Influenza Vaccine Adult HD    10/14/2021  Imm Admin: Influenza Vaccine Adult HD    2020  Imm Admin: Influenza Vaccine Adult HD      Only the first 5  history entries have been loaded, but more history exists.              Diabetes: Monofilament / LE Exam (Yearly) Next due on 9/6/2025 09/06/2024  Smart[employer]: WORKFLOW - DIABETES - DIABETIC FOOT EXAM PERFORMED    09/06/2024  Diabetic Monofilament LE Exam              A1c Screening (Every 6 Months) Next due on 10/1/2025      04/01/2025  POCT  A1C    10/31/2024  POCT  A1C    07/23/2024  HEMOGLOBIN A1C    02/16/2024  HEMOGLOBIN A1C    10/31/2023  POCT  A1C      Only the first 5 history entries have been loaded, but more history exists.              SERUM CREATININE (Yearly) Next due on 2/11/2026 02/18/2025  Order placed for Comp Metabolic Panel by Corina Martins P.A.-C.    02/11/2025  Comp Metabolic Panel    07/23/2024  Comp Metabolic Panel    02/16/2024  Comp Metabolic Panel    08/17/2023  Comp Metabolic Panel      Only the first 5 history entries have been loaded, but more history exists.              IMM DTaP/Tdap/Td Vaccine (2 - Td or Tdap) Next due on 7/30/2031 07/30/2021  Imm Admin: Tdap Vaccine                      Completed or No Longer Recommended       Hepatitis B Vaccine (Hep B) (Series Information) Completed      06/09/2020  Imm Admin: Hep A/HEP B Combined Vaccine (TwinRix)    12/11/2019  Imm Admin: Hep A/HEP B Combined Vaccine (TwinRix)    10/30/2019  Imm Admin: Hep A/HEP B Combined Vaccine (TwinRix)              Zoster (Shingles) Vaccines (Series Information) Completed      04/13/2021  Imm Admin: Zoster Vaccine Recombinant (RZV) (SHINGRIX)    07/23/2020  Imm Admin: Zoster Vaccine Recombinant (RZV) (SHINGRIX)    08/16/2016  Imm Admin: Zoster Vaccine Live (ZVL) (Zostavax) - HISTORICAL DATA              Pneumococcal Vaccine: 50+ Years (Series Information) Completed      04/04/2022  Imm Admin: Pneumococcal polysaccharide vaccine (PPSV-23)    08/16/2016  Imm Admin: Pneumococcal Conjugate Vaccine (Prevnar/PCV-13)              Hepatitis A Vaccine (Hep A) (Series Information) Aged Out       06/09/2020  Imm Admin: Hep A/HEP B Combined Vaccine (TwinRix)    12/11/2019  Imm Admin: Hep A/HEP B Combined Vaccine (TwinRix)    10/30/2019  Imm Admin: Hep A/HEP B Combined Vaccine (TwinRix)              HPV Vaccines (Series Information) Aged Out      No completion history exists for this topic.              Polio Vaccine (Inactivated Polio) (Series Information) Aged Out     No completion history exists for this topic.              Meningococcal Immunization (Series Information) Aged Out     No completion history exists for this topic.              Meningococcal B Vaccine (Series Information) Aged Out     No completion history exists for this topic.                            Patient Care Team:  Etta Joe P.A.-C. as PCP - General (Physician Assistant)  BLAIRE De Souza (Medical Oncology)  Corina Martins P.A.-C. (Physician Assistant)    Financial Resource Strain: Low Risk  (1/22/2024)    Overall Financial Resource Strain (CARDIA)     Difficulty of Paying Living Expenses: Not hard at all      Transportation Needs: No Transportation Needs (1/22/2024)    PRAPARE - Transportation     Lack of Transportation (Medical): No     Lack of Transportation (Non-Medical): No      Food Insecurity: Unknown (1/22/2024)    Hunger Vital Sign     Worried About Running Out of Food in the Last Year: Never true     Ran Out of Food in the Last Year: Not on file              ROS:  No fever, chills, nausea, vomiting, diarrhea, chest pain or shortness of breath. See HPI.    Physical Exam:  Constitutional: NAD  HENMT: NC/AT, PERRLA, EOMI, OP clear, TM's clear, no lymphadenopathy, no thyromegaly.  No JVD.  Cardiovascular: RRR, No m/r/g  Lungs: CTAB, no w/r/r  Extremities: 2+ DP, PT and Radial pulses bilaterally. No c/c/e  Skin: No legions notes  Neurologic: Alert & oriented x3, CN II-XII grossly intact    Assessment and Plan. The following treatment and monitoring plan is recommended:  Cerebral atrophy (HCC)  Chronic, stable.  Reviewed MRI of the brain from March 2024 with age-related volume loss. Denies visual disturbances, loss of coordination, and changes in mentation. Monitored by primary care provider.      Dyslipidemia associated with type 2 diabetes mellitus (HCC)  Chronic, stable. Reviewed lipid profile from July 2024. Currently taking atorvastatin 20 mg daily. Provided education on following a heart healthy diet with adequate intake of fruits, vegetables, and whole grains. Encourage 30 minutes of moderate exercise 3-4 times a week. Provided [facility] gym resources. Denies chest pain and claudication. Routinely monitored by primary care provider.      Hypothyroidism  Chronic, stable. Currently taking levothyroxine 50 mcg daily as prescribed. Denies fatigue, palpitations, hair and skin changes, temperature intolerance, changes in bowel habits, and weight loss or weight gain. Managed by primary care provider.      Type 2 diabetes mellitus with hyperglycemia, without long-term current use of insulin (HCC)  Chronic, stable. Most recent hemoglobin A1C was 6.0 in April 2025. Currently taking metformin 500 mg daily. Routinely monitored by primary care provider.  --In-office retinopathy screening ordered.  --In-office urine protein screening ordered.          Services suggested: { AWV COORDINATION OF SERVICES:20405}  Health Care Screening: Age-appropriate preventive services recommended by USPTF and ACIP covered by Medicare were discussed today. Services ordered if indicated and agreed upon by the patient.  Referrals offered: Community-based lifestyle interventions to reduce health risks and promote self-management and wellness, fall prevention, nutrition, physical activity, tobacco-use cessation, weight loss, and mental health services as per orders if indicated.    Discussion today about general wellness and lifestyle habits:    Prevent falls and reduce trip hazards; Cautioned about securing or removing rugs.  Have a working fire  alarm and carbon monoxide detector.  Engage in regular physical activity and social activities.    Follow-up: No follow-ups on file.              [1]   Current Outpatient Medications   Medication Sig Dispense Refill    metFORMIN (GLUCOPHAGE) 500 MG Tab Take 1 Tablet by mouth every day. 100 Tablet 3    Continuous Glucose Sensor (FREESTYLE ALESSANDRO 3 PLUS SENSOR) Misc 1 Device every 15 days. 2 Each 5    acyclovir (ZOVIRAX) 5 % Ointment Apply 1 Application topically every 4 hours. 15 g 0    levothyroxine (SYNTHROID) 50 MCG Tab Take 1 Tablet by mouth every morning on an empty stomach. 100 Tablet 3    atorvastatin (LIPITOR) 20 MG Tab Take 1 Tablet by mouth every evening. 100 Tablet 3    PEG 3350-KCl-NaBcb-NaCl-NaSulf (PEG-3350/ELECTROLYTES) 236 g Recon Soln       B Complex Vitamins (B COMPLEX-B12 PO) Take  by mouth every day.      VITAMIN A PO Take  by mouth every day.      Cholecalciferol (VITAMIN D3 PO) Take  by mouth every day.       No current facility-administered medications for this visit.   [2]   Social History  Tobacco Use    Smoking status: Never    Smokeless tobacco: Never   Vaping Use    Vaping status: Some Days   Substance Use Topics    Alcohol use: Yes     Alcohol/week: 0.6 oz     Types: 1 Glasses of wine per week     Comment: 4-5/week; beer once in a while. 1/18/22: 1 drink per day.    Drug use: No   [3]   Past Surgical History:  Procedure Laterality Date    LA DX BONE MARROW ASPIRATIONS Left 1/21/2022    Procedure: ASPIRATION, BONE MARROW - DR SANCHEZ;  Surgeon: Tyrel Mcintyre M.D.;  Location: SURGERY SAME DAY [facility];  Service: Orthopedics    LA DX BONE MARROW BIOPSIES Left 1/21/2022    Procedure: BIOPSY, BONE MARROW, USING NEEDLE OR TROCAR;  Surgeon: Tyrel Mcintyre M.D.;  Location: SURGERY SAME DAY [facility];  Service: Orthopedics    INGUINAL HERNIA REPAIR ROBOTIC Left 4/8/2016    Procedure: INGUINAL HERNIA REPAIR ROBOTIC WITH MESH;  Surgeon: Dustin Maria M.D.;  Location: SURGERY SAME DAY [facility];   Service:     OTHER  2006    alonso iol    CATARACT EXTRACTION WITH IOL Bilateral     HERNIA REPAIR      bilat

## 2025-07-12 DIAGNOSIS — E11.65 TYPE 2 DIABETES MELLITUS WITH HYPERGLYCEMIA, WITHOUT LONG-TERM CURRENT USE OF INSULIN (HCC): ICD-10-CM

## 2025-07-14 RX ORDER — ATORVASTATIN CALCIUM 20 MG/1
20 TABLET, FILM COATED ORAL NIGHTLY
Qty: 100 TABLET | Refills: 0 | Status: SHIPPED | OUTPATIENT
Start: 2025-07-14

## 2025-07-14 NOTE — TELEPHONE ENCOUNTER
Received request via: Patient    Was the patient seen in the last year in this department? Yes    Does the patient have an active prescription (recently filled or refills available) for medication(s) requested? No    Pharmacy Name: [id number]    Does the patient have skilled nursing Plus and need 100-day supply? (This applies to ALL medications) Yes, quantity updated to 100 days

## 2025-07-16 DIAGNOSIS — E03.9 HYPOTHYROIDISM, UNSPECIFIED TYPE: ICD-10-CM

## 2025-07-16 RX ORDER — LEVOTHYROXINE SODIUM 50 UG/1
50 TABLET ORAL
Qty: 100 TABLET | Refills: 0 | Status: SHIPPED | OUTPATIENT
Start: 2025-07-16

## 2025-07-16 NOTE — TELEPHONE ENCOUNTER
Received request via: Patient    Was the patient seen in the last year in this department? Yes    Does the patient have an active prescription (recently filled or refills available) for medication(s) requested? No    Pharmacy Name: [id number]    Does the patient have snf Plus and need 100-day supply? (This applies to ALL medications) Patient does not have SCP

## 2025-07-30 SDOH — ECONOMIC STABILITY: INCOME INSECURITY: IN THE LAST 12 MONTHS, WAS THERE A TIME WHEN YOU WERE NOT ABLE TO PAY THE MORTGAGE OR RENT ON TIME?: NO

## 2025-07-30 SDOH — ECONOMIC STABILITY: FOOD INSECURITY: WITHIN THE PAST 12 MONTHS, YOU WORRIED THAT YOUR FOOD WOULD RUN OUT BEFORE YOU GOT MONEY TO BUY MORE.: NEVER TRUE

## 2025-07-30 SDOH — HEALTH STABILITY: PHYSICAL HEALTH: ON AVERAGE, HOW MANY DAYS PER WEEK DO YOU ENGAGE IN MODERATE TO STRENUOUS EXERCISE (LIKE A BRISK WALK)?: 6 DAYS

## 2025-07-30 SDOH — HEALTH STABILITY: MENTAL HEALTH
STRESS IS WHEN SOMEONE FEELS TENSE, NERVOUS, ANXIOUS, OR CAN'T SLEEP AT NIGHT BECAUSE THEIR MIND IS TROUBLED. HOW STRESSED ARE YOU?: ONLY A LITTLE

## 2025-07-30 SDOH — ECONOMIC STABILITY: FOOD INSECURITY: WITHIN THE PAST 12 MONTHS, THE FOOD YOU BOUGHT JUST DIDN'T LAST AND YOU DIDN'T HAVE MONEY TO GET MORE.: NEVER TRUE

## 2025-07-30 SDOH — HEALTH STABILITY: PHYSICAL HEALTH: ON AVERAGE, HOW MANY MINUTES DO YOU ENGAGE IN EXERCISE AT THIS LEVEL?: 40 MIN

## 2025-07-30 ASSESSMENT — SOCIAL DETERMINANTS OF HEALTH (SDOH)
DO YOU BELONG TO ANY CLUBS OR ORGANIZATIONS SUCH AS CHURCH GROUPS UNIONS, FRATERNAL OR ATHLETIC GROUPS, OR SCHOOL GROUPS?: NO
HOW OFTEN DO YOU ATTENT MEETINGS OF THE CLUB OR ORGANIZATION YOU BELONG TO?: 1 TO 4 TIMES PER YEAR
HOW OFTEN DO YOU GET TOGETHER WITH FRIENDS OR RELATIVES?: TWICE A WEEK
HOW MANY DRINKS CONTAINING ALCOHOL DO YOU HAVE ON A TYPICAL DAY WHEN YOU ARE DRINKING: 1 OR 2
HOW OFTEN DO YOU ATTEND CHURCH OR RELIGIOUS SERVICES?: NEVER
IN A TYPICAL WEEK, HOW MANY TIMES DO YOU TALK ON THE PHONE WITH FAMILY, FRIENDS, OR NEIGHBORS?: MORE THAN THREE TIMES A WEEK
HOW OFTEN DO YOU ATTENT MEETINGS OF THE CLUB OR ORGANIZATION YOU BELONG TO?: 1 TO 4 TIMES PER YEAR
HOW OFTEN DO YOU ATTEND CHURCH OR RELIGIOUS SERVICES?: NEVER
WITHIN THE PAST 12 MONTHS, YOU WORRIED THAT YOUR FOOD WOULD RUN OUT BEFORE YOU GOT THE MONEY TO BUY MORE: NEVER TRUE
HOW OFTEN DO YOU GET TOGETHER WITH FRIENDS OR RELATIVES?: TWICE A WEEK
HOW OFTEN DO YOU HAVE A DRINK CONTAINING ALCOHOL: 2-3 TIMES A WEEK
HOW OFTEN DO YOU HAVE SIX OR MORE DRINKS ON ONE OCCASION: NEVER
IN THE PAST 12 MONTHS, HAS THE ELECTRIC, GAS, OIL, OR WATER COMPANY THREATENED TO SHUT OFF SERVICE IN YOUR HOME?: NO
IN A TYPICAL WEEK, HOW MANY TIMES DO YOU TALK ON THE PHONE WITH FAMILY, FRIENDS, OR NEIGHBORS?: MORE THAN THREE TIMES A WEEK
DO YOU BELONG TO ANY CLUBS OR ORGANIZATIONS SUCH AS CHURCH GROUPS UNIONS, FRATERNAL OR ATHLETIC GROUPS, OR SCHOOL GROUPS?: NO

## 2025-07-30 ASSESSMENT — LIFESTYLE VARIABLES
HOW OFTEN DO YOU HAVE A DRINK CONTAINING ALCOHOL: 2-3 TIMES A WEEK
HOW MANY STANDARD DRINKS CONTAINING ALCOHOL DO YOU HAVE ON A TYPICAL DAY: 1 OR 2
AUDIT-C TOTAL SCORE: 3
SKIP TO QUESTIONS 9-10: 1
HOW OFTEN DO YOU HAVE SIX OR MORE DRINKS ON ONE OCCASION: NEVER

## 2025-08-01 ENCOUNTER — OFFICE VISIT (OUTPATIENT)
Dept: INTERNAL MEDICINE | Facility: IMAGING CENTER | Age: 80
End: 2025-08-01
Payer: MEDICARE

## 2025-08-01 VITALS
OXYGEN SATURATION: 98 % | HEIGHT: 70 IN | HEART RATE: 66 BPM | BODY MASS INDEX: 20.04 KG/M2 | SYSTOLIC BLOOD PRESSURE: 118 MMHG | RESPIRATION RATE: 14 BRPM | DIASTOLIC BLOOD PRESSURE: 68 MMHG | TEMPERATURE: 97.9 F | WEIGHT: 140 LBS

## 2025-08-01 DIAGNOSIS — E11.65 TYPE 2 DIABETES MELLITUS WITH HYPERGLYCEMIA, WITHOUT LONG-TERM CURRENT USE OF INSULIN (HCC): ICD-10-CM

## 2025-08-01 DIAGNOSIS — I70.0 ATHEROSCLEROSIS OF AORTA (HCC): Chronic | ICD-10-CM

## 2025-08-01 DIAGNOSIS — E03.9 HYPOTHYROIDISM, UNSPECIFIED TYPE: Primary | ICD-10-CM

## 2025-08-01 DIAGNOSIS — Z79.899 MEDICATION MANAGEMENT: ICD-10-CM

## 2025-08-01 PROCEDURE — 3078F DIAST BP <80 MM HG: CPT | Performed by: FAMILY MEDICINE

## 2025-08-01 PROCEDURE — 99214 OFFICE O/P EST MOD 30 MIN: CPT | Performed by: FAMILY MEDICINE

## 2025-08-01 PROCEDURE — 3074F SYST BP LT 130 MM HG: CPT | Performed by: FAMILY MEDICINE

## 2025-08-01 ASSESSMENT — FIBROSIS 4 INDEX: FIB4 SCORE: 1.575

## 2025-08-05 ENCOUNTER — OFFICE VISIT (OUTPATIENT)
Facility: MEDICAL CENTER | Age: 80
End: 2025-08-05
Attending: NURSE PRACTITIONER
Payer: MEDICARE

## 2025-08-05 VITALS
WEIGHT: 140 LBS | HEART RATE: 63 BPM | OXYGEN SATURATION: 98 % | HEIGHT: 70 IN | DIASTOLIC BLOOD PRESSURE: 58 MMHG | SYSTOLIC BLOOD PRESSURE: 100 MMHG | BODY MASS INDEX: 20.04 KG/M2 | RESPIRATION RATE: 14 BRPM

## 2025-08-05 DIAGNOSIS — E11.65 TYPE 2 DIABETES MELLITUS WITH HYPERGLYCEMIA, WITHOUT LONG-TERM CURRENT USE OF INSULIN (HCC): ICD-10-CM

## 2025-08-05 DIAGNOSIS — R93.1 AGATSTON CAC SCORE 200-399: Chronic | ICD-10-CM

## 2025-08-05 DIAGNOSIS — C88.00 WALDENSTROM MACROGLOBULINEMIA (HCC): ICD-10-CM

## 2025-08-05 DIAGNOSIS — R06.02 SHORTNESS OF BREATH: ICD-10-CM

## 2025-08-05 DIAGNOSIS — E11.69 DYSLIPIDEMIA ASSOCIATED WITH TYPE 2 DIABETES MELLITUS (HCC): ICD-10-CM

## 2025-08-05 DIAGNOSIS — E78.5 DYSLIPIDEMIA ASSOCIATED WITH TYPE 2 DIABETES MELLITUS (HCC): ICD-10-CM

## 2025-08-05 DIAGNOSIS — I70.0 ATHEROSCLEROSIS OF AORTA (HCC): Chronic | ICD-10-CM

## 2025-08-05 DIAGNOSIS — R01.1 HEART MURMUR: Primary | ICD-10-CM

## 2025-08-05 DIAGNOSIS — Z91.89 10 YEAR RISK OF MI OR STROKE 7.5% OR GREATER: ICD-10-CM

## 2025-08-05 PROBLEM — M79.89 LEFT LEG SWELLING: Status: RESOLVED | Noted: 2025-04-01 | Resolved: 2025-08-05

## 2025-08-05 PROCEDURE — 99213 OFFICE O/P EST LOW 20 MIN: CPT | Performed by: NURSE PRACTITIONER

## 2025-08-05 PROCEDURE — 3078F DIAST BP <80 MM HG: CPT | Performed by: NURSE PRACTITIONER

## 2025-08-05 PROCEDURE — 3074F SYST BP LT 130 MM HG: CPT | Performed by: NURSE PRACTITIONER

## 2025-08-05 PROCEDURE — 99214 OFFICE O/P EST MOD 30 MIN: CPT | Performed by: NURSE PRACTITIONER

## 2025-08-05 RX ORDER — ATORVASTATIN CALCIUM 20 MG/1
20 TABLET, FILM COATED ORAL NIGHTLY
Qty: 100 TABLET | Refills: 3 | Status: SHIPPED | OUTPATIENT
Start: 2025-08-05

## 2025-08-05 ASSESSMENT — ENCOUNTER SYMPTOMS
SHORTNESS OF BREATH: 1
BRUISES/BLEEDS EASILY: 0
NAUSEA: 0
HEADACHES: 0
INSOMNIA: 0
ORTHOPNEA: 0
DIZZINESS: 1
PND: 0
PALPITATIONS: 0
CHILLS: 0
FEVER: 0
MYALGIAS: 0
LOSS OF CONSCIOUSNESS: 0
COUGH: 0
ABDOMINAL PAIN: 0

## 2025-08-05 ASSESSMENT — FIBROSIS 4 INDEX: FIB4 SCORE: 1.575

## 2025-08-08 ENCOUNTER — APPOINTMENT (OUTPATIENT)
Dept: LAB | Facility: MEDICAL CENTER | Age: 80
End: 2025-08-08
Payer: MEDICARE

## 2025-08-12 ENCOUNTER — HOSPITAL ENCOUNTER (OUTPATIENT)
Dept: LAB | Facility: MEDICAL CENTER | Age: 80
End: 2025-08-12
Attending: PHYSICIAN ASSISTANT
Payer: MEDICARE

## 2025-08-12 ENCOUNTER — APPOINTMENT (OUTPATIENT)
Dept: MEDICAL GROUP | Facility: IMAGING CENTER | Age: 80
End: 2025-08-12
Payer: MEDICARE

## 2025-08-12 DIAGNOSIS — C88.00 WALDENSTROM MACROGLOBULINEMIA (HCC): ICD-10-CM

## 2025-08-12 LAB
ALBUMIN SERPL BCP-MCNC: 4.5 G/DL (ref 3.2–4.9)
ALBUMIN/GLOB SERPL: 1.5 G/DL
ALP SERPL-CCNC: 53 U/L (ref 30–99)
ALT SERPL-CCNC: 14 U/L (ref 2–50)
ANION GAP SERPL CALC-SCNC: 11 MMOL/L (ref 7–16)
AST SERPL-CCNC: 19 U/L (ref 12–45)
BASOPHILS # BLD AUTO: 0.5 % (ref 0–1.8)
BASOPHILS # BLD: 0.04 K/UL (ref 0–0.12)
BILIRUB SERPL-MCNC: 0.6 MG/DL (ref 0.1–1.5)
BUN SERPL-MCNC: 15 MG/DL (ref 8–22)
CALCIUM ALBUM COR SERPL-MCNC: 9.4 MG/DL (ref 8.5–10.5)
CALCIUM SERPL-MCNC: 9.8 MG/DL (ref 8.5–10.5)
CHLORIDE SERPL-SCNC: 101 MMOL/L (ref 96–112)
CO2 SERPL-SCNC: 24 MMOL/L (ref 20–33)
CREAT SERPL-MCNC: 1.06 MG/DL (ref 0.5–1.4)
EOSINOPHIL # BLD AUTO: 0.15 K/UL (ref 0–0.51)
EOSINOPHIL NFR BLD: 2 % (ref 0–6.9)
ERYTHROCYTE [DISTWIDTH] IN BLOOD BY AUTOMATED COUNT: 48.9 FL (ref 35.9–50)
GFR SERPLBLD CREATININE-BSD FMLA CKD-EPI: 71 ML/MIN/1.73 M 2
GLOBULIN SER CALC-MCNC: 3.1 G/DL (ref 1.9–3.5)
GLUCOSE SERPL-MCNC: 105 MG/DL (ref 65–99)
HCT VFR BLD AUTO: 39.8 % (ref 42–52)
HGB BLD-MCNC: 13.7 G/DL (ref 14–18)
IMM GRANULOCYTES # BLD AUTO: 0.02 K/UL (ref 0–0.11)
IMM GRANULOCYTES NFR BLD AUTO: 0.3 % (ref 0–0.9)
LYMPHOCYTES # BLD AUTO: 2.09 K/UL (ref 1–4.8)
LYMPHOCYTES NFR BLD: 27.9 % (ref 22–41)
MCH RBC QN AUTO: 33.6 PG (ref 27–33)
MCHC RBC AUTO-ENTMCNC: 34.4 G/DL (ref 32.3–36.5)
MCV RBC AUTO: 97.5 FL (ref 81.4–97.8)
MONOCYTES # BLD AUTO: 0.84 K/UL (ref 0–0.85)
MONOCYTES NFR BLD AUTO: 11.2 % (ref 0–13.4)
NEUTROPHILS # BLD AUTO: 4.35 K/UL (ref 1.82–7.42)
NEUTROPHILS NFR BLD: 58.1 % (ref 44–72)
NRBC # BLD AUTO: 0 K/UL
NRBC BLD-RTO: 0 /100 WBC (ref 0–0.2)
PLATELET # BLD AUTO: 271 K/UL (ref 164–446)
PMV BLD AUTO: 8.6 FL (ref 9–12.9)
POTASSIUM SERPL-SCNC: 4.8 MMOL/L (ref 3.6–5.5)
PROT SERPL-MCNC: 7.6 G/DL (ref 6–8.2)
RBC # BLD AUTO: 4.08 M/UL (ref 4.7–6.1)
SODIUM SERPL-SCNC: 136 MMOL/L (ref 135–145)
WBC # BLD AUTO: 7.5 K/UL (ref 4.8–10.8)

## 2025-08-12 PROCEDURE — 82784 ASSAY IGA/IGD/IGG/IGM EACH: CPT

## 2025-08-12 PROCEDURE — 84165 PROTEIN E-PHORESIS SERUM: CPT

## 2025-08-12 PROCEDURE — 86334 IMMUNOFIX E-PHORESIS SERUM: CPT

## 2025-08-12 PROCEDURE — 85025 COMPLETE CBC W/AUTO DIFF WBC: CPT

## 2025-08-12 PROCEDURE — 36415 COLL VENOUS BLD VENIPUNCTURE: CPT

## 2025-08-12 PROCEDURE — 84155 ASSAY OF PROTEIN SERUM: CPT

## 2025-08-12 PROCEDURE — 83521 IG LIGHT CHAINS FREE EACH: CPT

## 2025-08-12 PROCEDURE — 80053 COMPREHEN METABOLIC PANEL: CPT

## 2025-08-14 ENCOUNTER — APPOINTMENT (OUTPATIENT)
Dept: HEMATOLOGY ONCOLOGY | Facility: MEDICAL CENTER | Age: 80
End: 2025-08-14
Attending: NURSE PRACTITIONER
Payer: MEDICARE

## 2025-08-15 ENCOUNTER — HOSPITAL ENCOUNTER (OUTPATIENT)
Dept: LAB | Facility: MEDICAL CENTER | Age: 80
End: 2025-08-15
Attending: FAMILY MEDICINE
Payer: MEDICARE

## 2025-08-15 DIAGNOSIS — Z79.899 MEDICATION MANAGEMENT: ICD-10-CM

## 2025-08-15 DIAGNOSIS — E11.65 TYPE 2 DIABETES MELLITUS WITH HYPERGLYCEMIA, WITHOUT LONG-TERM CURRENT USE OF INSULIN (HCC): ICD-10-CM

## 2025-08-15 LAB
25(OH)D3 SERPL-MCNC: 118 NG/ML (ref 30–100)
ALBUMIN SERPL BCP-MCNC: 4.3 G/DL (ref 3.2–4.9)
ALBUMIN SERPL ELPH-MCNC: 4.21 G/DL (ref 3.75–5.01)
ALBUMIN/GLOB SERPL: 1.4 G/DL
ALP SERPL-CCNC: 53 U/L (ref 30–99)
ALPHA1 GLOB SERPL ELPH-MCNC: 0.27 G/DL (ref 0.19–0.46)
ALPHA2 GLOB SERPL ELPH-MCNC: 0.73 G/DL (ref 0.48–1.05)
ALT SERPL-CCNC: 13 U/L (ref 2–50)
ANION GAP SERPL CALC-SCNC: 13 MMOL/L (ref 7–16)
AST SERPL-CCNC: 18 U/L (ref 12–45)
B-GLOBULIN SERPL ELPH-MCNC: 0.63 G/DL (ref 0.48–1.1)
BASOPHILS # BLD AUTO: 0.5 % (ref 0–1.8)
BASOPHILS # BLD: 0.03 K/UL (ref 0–0.12)
BILIRUB SERPL-MCNC: 0.6 MG/DL (ref 0.1–1.5)
BUN SERPL-MCNC: 12 MG/DL (ref 8–22)
CALCIUM ALBUM COR SERPL-MCNC: 9.2 MG/DL (ref 8.5–10.5)
CALCIUM SERPL-MCNC: 9.4 MG/DL (ref 8.5–10.5)
CHLORIDE SERPL-SCNC: 101 MMOL/L (ref 96–112)
CHOLEST SERPL-MCNC: 131 MG/DL (ref 100–199)
CO2 SERPL-SCNC: 22 MMOL/L (ref 20–33)
CREAT SERPL-MCNC: 1.01 MG/DL (ref 0.5–1.4)
CREAT UR-MCNC: 77.6 MG/DL
EER MONOCLONAL PROTEIN AND FLC, SERUM Q5224: ABNORMAL
EOSINOPHIL # BLD AUTO: 0.18 K/UL (ref 0–0.51)
EOSINOPHIL NFR BLD: 2.9 % (ref 0–6.9)
ERYTHROCYTE [DISTWIDTH] IN BLOOD BY AUTOMATED COUNT: 47.3 FL (ref 35.9–50)
EST. AVERAGE GLUCOSE BLD GHB EST-MCNC: 137 MG/DL
FASTING STATUS PATIENT QL REPORTED: NORMAL
GAMMA GLOB SERPL ELPH-MCNC: 1.36 G/DL (ref 0.62–1.51)
GFR SERPLBLD CREATININE-BSD FMLA CKD-EPI: 76 ML/MIN/1.73 M 2
GLOBULIN SER CALC-MCNC: 3 G/DL (ref 1.9–3.5)
GLUCOSE SERPL-MCNC: 104 MG/DL (ref 65–99)
HBA1C MFR BLD: 6.4 % (ref 4–5.6)
HCT VFR BLD AUTO: 38.3 % (ref 42–52)
HDLC SERPL-MCNC: 70 MG/DL
HGB BLD-MCNC: 13.1 G/DL (ref 14–18)
IGA SERPL-MCNC: 62 MG/DL (ref 68–408)
IGG SERPL-MCNC: 795 MG/DL (ref 768–1632)
IGM SERPL-MCNC: 1131 MG/DL (ref 35–263)
IMM GRANULOCYTES # BLD AUTO: 0.03 K/UL (ref 0–0.11)
IMM GRANULOCYTES NFR BLD AUTO: 0.5 % (ref 0–0.9)
INTERPRETATION SERPL IFE-IMP: ABNORMAL
INTERPRETATION SERPL IFE-IMP: ABNORMAL
KAPPA LC FREE SER-MCNC: 43.82 MG/L (ref 3.3–19.4)
KAPPA LC FREE/LAMBDA FREE SER NEPH: 5.05 {RATIO} (ref 0.26–1.65)
LAMBDA LC FREE SERPL-MCNC: 8.67 MG/L (ref 5.71–26.3)
LDLC SERPL CALC-MCNC: 49 MG/DL
LYMPHOCYTES # BLD AUTO: 2.41 K/UL (ref 1–4.8)
LYMPHOCYTES NFR BLD: 38.2 % (ref 22–41)
MCH RBC QN AUTO: 32.4 PG (ref 27–33)
MCHC RBC AUTO-ENTMCNC: 34.2 G/DL (ref 32.3–36.5)
MCV RBC AUTO: 94.8 FL (ref 81.4–97.8)
MICROALBUMIN UR-MCNC: <1.2 MG/DL
MICROALBUMIN/CREAT UR: NORMAL MG/G (ref 0–30)
MONOCLONAL PROTEIN NL11656: 0.93 G/DL
MONOCYTES # BLD AUTO: 0.67 K/UL (ref 0–0.85)
MONOCYTES NFR BLD AUTO: 10.6 % (ref 0–13.4)
NEUTROPHILS # BLD AUTO: 2.99 K/UL (ref 1.82–7.42)
NEUTROPHILS NFR BLD: 47.3 % (ref 44–72)
NRBC # BLD AUTO: 0 K/UL
NRBC BLD-RTO: 0 /100 WBC (ref 0–0.2)
PLATELET # BLD AUTO: 265 K/UL (ref 164–446)
PMV BLD AUTO: 8.5 FL (ref 9–12.9)
POTASSIUM SERPL-SCNC: 4.1 MMOL/L (ref 3.6–5.5)
PROT SERPL-MCNC: 7.2 G/DL (ref 6.3–8.2)
PROT SERPL-MCNC: 7.3 G/DL (ref 6–8.2)
RBC # BLD AUTO: 4.04 M/UL (ref 4.7–6.1)
SODIUM SERPL-SCNC: 136 MMOL/L (ref 135–145)
TRIGL SERPL-MCNC: 60 MG/DL (ref 0–149)
TSH SERPL-ACNC: 3.35 UIU/ML (ref 0.38–5.33)
VIT B12 SERPL-MCNC: 2771 PG/ML (ref 211–911)
WBC # BLD AUTO: 6.3 K/UL (ref 4.8–10.8)

## 2025-08-15 PROCEDURE — 82306 VITAMIN D 25 HYDROXY: CPT

## 2025-08-15 PROCEDURE — 82570 ASSAY OF URINE CREATININE: CPT

## 2025-08-15 PROCEDURE — 85025 COMPLETE CBC W/AUTO DIFF WBC: CPT

## 2025-08-15 PROCEDURE — 84443 ASSAY THYROID STIM HORMONE: CPT

## 2025-08-15 PROCEDURE — 36415 COLL VENOUS BLD VENIPUNCTURE: CPT

## 2025-08-15 PROCEDURE — 83036 HEMOGLOBIN GLYCOSYLATED A1C: CPT

## 2025-08-15 PROCEDURE — 80053 COMPREHEN METABOLIC PANEL: CPT

## 2025-08-15 PROCEDURE — 82607 VITAMIN B-12: CPT

## 2025-08-15 PROCEDURE — 82043 UR ALBUMIN QUANTITATIVE: CPT

## 2025-08-15 PROCEDURE — 80061 LIPID PANEL: CPT

## 2025-08-18 DIAGNOSIS — E11.65 TYPE 2 DIABETES MELLITUS WITH HYPERGLYCEMIA, WITHOUT LONG-TERM CURRENT USE OF INSULIN (HCC): ICD-10-CM

## 2025-08-19 ENCOUNTER — HOSPITAL ENCOUNTER (OUTPATIENT)
Dept: HEMATOLOGY ONCOLOGY | Facility: MEDICAL CENTER | Age: 80
End: 2025-08-19
Attending: NURSE PRACTITIONER
Payer: MEDICARE

## 2025-08-19 VITALS
HEIGHT: 70 IN | DIASTOLIC BLOOD PRESSURE: 62 MMHG | BODY MASS INDEX: 20.18 KG/M2 | SYSTOLIC BLOOD PRESSURE: 128 MMHG | WEIGHT: 140.98 LBS | HEART RATE: 62 BPM | TEMPERATURE: 98.2 F | RESPIRATION RATE: 14 BRPM | OXYGEN SATURATION: 97 %

## 2025-08-19 DIAGNOSIS — C88.00 WALDENSTROM MACROGLOBULINEMIA (HCC): Primary | ICD-10-CM

## 2025-08-19 PROCEDURE — 99214 OFFICE O/P EST MOD 30 MIN: CPT | Performed by: NURSE PRACTITIONER

## 2025-08-19 PROCEDURE — 99212 OFFICE O/P EST SF 10 MIN: CPT | Performed by: NURSE PRACTITIONER

## 2025-08-19 ASSESSMENT — ENCOUNTER SYMPTOMS
NAUSEA: 0
FEVER: 0
MYALGIAS: 0
DIZZINESS: 0
VOMITING: 0
CHILLS: 0
DIARRHEA: 0
COUGH: 0
SHORTNESS OF BREATH: 0
CONSTIPATION: 0
DIAPHORESIS: 0
WEIGHT LOSS: 0
PALPITATIONS: 0
HEADACHES: 0

## 2025-08-19 ASSESSMENT — FIBROSIS 4 INDEX: FIB4 SCORE: 1.47

## 2025-08-19 ASSESSMENT — PAIN SCALES - GENERAL: PAINLEVEL_OUTOF10: NO PAIN

## (undated) DEVICE — BANDAID SHEER STRIP 3/4 IN (100EA/BX 12BX/CA)

## (undated) DEVICE — ELECTRODE 850 FOAM ADHESIVE - HYDROGEL RADIOTRNSPRNT (50/PK)

## (undated) DEVICE — SUCTION INSTRUMENT YANKAUER BULBOUS TIP W/O VENT (50EA/CA)

## (undated) DEVICE — SENSOR SPO2 ADULT LNCS ADTX (20/BX) ORDER ITEM #19593

## (undated) DEVICE — TUBE CONNECTING SUCTION - CLEAR PLASTIC STERILE 72 IN (50EA/CA)

## (undated) DEVICE — SET LEADWIRE 5 LEAD BEDSIDE DISPOSABLE ECG (1SET OF 5/EA)

## (undated) DEVICE — SYRINGE 3 CC 22 GA X 1-1/2 - NDL SAFETY (50/BX 8BX/CA)

## (undated) DEVICE — CANISTER SUCTION RIGID RED 1500CC (40EA/CA)

## (undated) DEVICE — CUSHION EAR E-Z WRAP NASAL CANNULA - (25/CA)

## (undated) DEVICE — TOWEL STOP TIMEOUT SAFETY FLAG (40EA/CA)

## (undated) DEVICE — SOD. CHL 10CC SYRINGE PREFILL - W/10 CC (30/BX)

## (undated) DEVICE — SYRINGE NON SAFETY 5 CC 20 GA X 1-1/2 IN (100/BX 4BX/CA)